# Patient Record
Sex: MALE | Race: WHITE | NOT HISPANIC OR LATINO | Employment: OTHER | URBAN - METROPOLITAN AREA
[De-identification: names, ages, dates, MRNs, and addresses within clinical notes are randomized per-mention and may not be internally consistent; named-entity substitution may affect disease eponyms.]

---

## 2020-02-18 ENCOUNTER — TRANSCRIBE ORDERS (OUTPATIENT)
Dept: ADMINISTRATIVE | Facility: HOSPITAL | Age: 65
End: 2020-02-18

## 2021-03-17 ENCOUNTER — TRANSCRIBE ORDERS (OUTPATIENT)
Dept: ADMINISTRATIVE | Facility: HOSPITAL | Age: 66
End: 2021-03-17

## 2021-03-17 DIAGNOSIS — R06.00 DYSPNEA, UNSPECIFIED: Primary | ICD-10-CM

## 2021-04-19 ENCOUNTER — HOSPITAL ENCOUNTER (OUTPATIENT)
Dept: NON INVASIVE DIAGNOSTICS | Facility: HOSPITAL | Age: 66
Discharge: HOME | End: 2021-04-19
Payer: MEDICARE

## 2021-04-19 DIAGNOSIS — R06.00 DYSPNEA, UNSPECIFIED: ICD-10-CM

## 2021-04-19 PROCEDURE — 93325 DOPPLER ECHO COLOR FLOW MAPG: CPT | Performed by: INTERNAL MEDICINE

## 2021-04-19 PROCEDURE — 93308 TTE F-UP OR LMTD: CPT | Performed by: INTERNAL MEDICINE

## 2021-04-19 PROCEDURE — 93306 TTE W/DOPPLER COMPLETE: CPT

## 2021-04-19 PROCEDURE — 93321 DOPPLER ECHO F-UP/LMTD STD: CPT | Performed by: INTERNAL MEDICINE

## 2021-06-09 DIAGNOSIS — D75.1 SECONDARY POLYCYTHEMIA: ICD-10-CM

## 2021-07-01 ENCOUNTER — HOSPITAL ENCOUNTER (OUTPATIENT)
Dept: ULTRASOUND IMAGING | Facility: HOSPITAL | Age: 66
Discharge: HOME | End: 2021-07-01
Payer: MEDICARE

## 2021-07-01 DIAGNOSIS — D75.1 SECONDARY POLYCYTHEMIA: ICD-10-CM

## 2021-07-01 PROCEDURE — 76700 US EXAM ABDOM COMPLETE: CPT

## 2021-09-30 ENCOUNTER — HOSPITAL ENCOUNTER (OUTPATIENT)
Dept: NON INVASIVE DIAGNOSTICS | Facility: HOSPITAL | Age: 66
Discharge: HOME | End: 2021-09-30

## 2021-09-30 DIAGNOSIS — B35.3 TINEA PEDIS OF BOTH FEET: ICD-10-CM

## 2022-05-30 ENCOUNTER — APPOINTMENT (EMERGENCY)
Dept: RADIOLOGY | Facility: HOSPITAL | Age: 67
DRG: 871 | End: 2022-05-30
Payer: MEDICARE

## 2022-05-30 ENCOUNTER — HOSPITAL ENCOUNTER (INPATIENT)
Facility: HOSPITAL | Age: 67
LOS: 3 days | Discharge: HOME | DRG: 871 | End: 2022-06-03
Attending: STUDENT IN AN ORGANIZED HEALTH CARE EDUCATION/TRAINING PROGRAM | Admitting: FAMILY MEDICINE
Payer: MEDICARE

## 2022-05-30 DIAGNOSIS — J96.22 ACUTE ON CHRONIC RESPIRATORY FAILURE WITH HYPOXIA AND HYPERCAPNIA (HCC): Primary | ICD-10-CM

## 2022-05-30 DIAGNOSIS — J44.1 COPD EXACERBATION (HCC): ICD-10-CM

## 2022-05-30 DIAGNOSIS — J90 BILATERAL PLEURAL EFFUSION: ICD-10-CM

## 2022-05-30 DIAGNOSIS — J18.9 PNEUMONIA: ICD-10-CM

## 2022-05-30 DIAGNOSIS — J96.21 ACUTE ON CHRONIC RESPIRATORY FAILURE WITH HYPOXIA AND HYPERCAPNIA (HCC): Primary | ICD-10-CM

## 2022-05-30 LAB
BASE EX.OXY STD BLDV CALC-SCNC: 77.5 % (ref 60–80)
BASE EXCESS BLDV CALC-SCNC: 9 MMOL/L
BASOPHILS # BLD AUTO: 0.08 THOUSANDS/ΜL (ref 0–0.1)
BASOPHILS NFR BLD AUTO: 0 % (ref 0–1)
EOSINOPHIL # BLD AUTO: 0.23 THOUSAND/ΜL (ref 0–0.61)
EOSINOPHIL NFR BLD AUTO: 1 % (ref 0–6)
ERYTHROCYTE [DISTWIDTH] IN BLOOD BY AUTOMATED COUNT: 13.3 % (ref 11.6–15.1)
HCO3 BLDV-SCNC: 36.4 MMOL/L (ref 24–30)
HCT VFR BLD AUTO: 47.8 % (ref 36.5–49.3)
HGB BLD-MCNC: 15.4 G/DL (ref 12–17)
IMM GRANULOCYTES # BLD AUTO: 0.08 THOUSAND/UL (ref 0–0.2)
IMM GRANULOCYTES NFR BLD AUTO: 0 % (ref 0–2)
INR PPP: 1.01 (ref 0.84–1.19)
LYMPHOCYTES # BLD AUTO: 0.9 THOUSANDS/ΜL (ref 0.6–4.47)
LYMPHOCYTES NFR BLD AUTO: 4 % (ref 14–44)
MCH RBC QN AUTO: 30.1 PG (ref 26.8–34.3)
MCHC RBC AUTO-ENTMCNC: 32.2 G/DL (ref 31.4–37.4)
MCV RBC AUTO: 94 FL (ref 82–98)
MONOCYTES # BLD AUTO: 1.4 THOUSAND/ΜL (ref 0.17–1.22)
MONOCYTES NFR BLD AUTO: 6 % (ref 4–12)
NEUTROPHILS # BLD AUTO: 20.15 THOUSANDS/ΜL (ref 1.85–7.62)
NEUTS SEG NFR BLD AUTO: 89 % (ref 43–75)
NRBC BLD AUTO-RTO: 0 /100 WBCS
O2 CT BLDV-SCNC: 17.8 ML/DL
PCO2 BLDV: 59.5 MM HG (ref 42–50)
PH BLDV: 7.4 [PH] (ref 7.3–7.4)
PLATELET # BLD AUTO: 281 THOUSANDS/UL (ref 149–390)
PMV BLD AUTO: 9.5 FL (ref 8.9–12.7)
PO2 BLDV: 40.6 MM HG (ref 35–45)
PROTHROMBIN TIME: 13.2 SECONDS (ref 11.6–14.5)
RBC # BLD AUTO: 5.11 MILLION/UL (ref 3.88–5.62)
WBC # BLD AUTO: 22.84 THOUSAND/UL (ref 4.31–10.16)

## 2022-05-30 PROCEDURE — 83880 ASSAY OF NATRIURETIC PEPTIDE: CPT | Performed by: STUDENT IN AN ORGANIZED HEALTH CARE EDUCATION/TRAINING PROGRAM

## 2022-05-30 PROCEDURE — 80053 COMPREHEN METABOLIC PANEL: CPT | Performed by: STUDENT IN AN ORGANIZED HEALTH CARE EDUCATION/TRAINING PROGRAM

## 2022-05-30 PROCEDURE — 87040 BLOOD CULTURE FOR BACTERIA: CPT | Performed by: STUDENT IN AN ORGANIZED HEALTH CARE EDUCATION/TRAINING PROGRAM

## 2022-05-30 PROCEDURE — 83605 ASSAY OF LACTIC ACID: CPT | Performed by: STUDENT IN AN ORGANIZED HEALTH CARE EDUCATION/TRAINING PROGRAM

## 2022-05-30 PROCEDURE — 84484 ASSAY OF TROPONIN QUANT: CPT | Performed by: STUDENT IN AN ORGANIZED HEALTH CARE EDUCATION/TRAINING PROGRAM

## 2022-05-30 PROCEDURE — 85025 COMPLETE CBC W/AUTO DIFF WBC: CPT | Performed by: STUDENT IN AN ORGANIZED HEALTH CARE EDUCATION/TRAINING PROGRAM

## 2022-05-30 PROCEDURE — 83735 ASSAY OF MAGNESIUM: CPT | Performed by: STUDENT IN AN ORGANIZED HEALTH CARE EDUCATION/TRAINING PROGRAM

## 2022-05-30 PROCEDURE — 82805 BLOOD GASES W/O2 SATURATION: CPT | Performed by: STUDENT IN AN ORGANIZED HEALTH CARE EDUCATION/TRAINING PROGRAM

## 2022-05-30 PROCEDURE — 84145 PROCALCITONIN (PCT): CPT | Performed by: STUDENT IN AN ORGANIZED HEALTH CARE EDUCATION/TRAINING PROGRAM

## 2022-05-30 PROCEDURE — 85379 FIBRIN DEGRADATION QUANT: CPT

## 2022-05-30 PROCEDURE — 99285 EMERGENCY DEPT VISIT HI MDM: CPT | Performed by: STUDENT IN AN ORGANIZED HEALTH CARE EDUCATION/TRAINING PROGRAM

## 2022-05-30 PROCEDURE — 85610 PROTHROMBIN TIME: CPT | Performed by: STUDENT IN AN ORGANIZED HEALTH CARE EDUCATION/TRAINING PROGRAM

## 2022-05-30 PROCEDURE — 93005 ELECTROCARDIOGRAM TRACING: CPT

## 2022-05-30 PROCEDURE — 0241U HB NFCT DS VIR RESP RNA 4 TRGT: CPT | Performed by: STUDENT IN AN ORGANIZED HEALTH CARE EDUCATION/TRAINING PROGRAM

## 2022-05-30 PROCEDURE — 99285 EMERGENCY DEPT VISIT HI MDM: CPT

## 2022-05-30 PROCEDURE — 1124F ACP DISCUSS-NO DSCNMKR DOCD: CPT | Performed by: STUDENT IN AN ORGANIZED HEALTH CARE EDUCATION/TRAINING PROGRAM

## 2022-05-30 PROCEDURE — 36415 COLL VENOUS BLD VENIPUNCTURE: CPT | Performed by: STUDENT IN AN ORGANIZED HEALTH CARE EDUCATION/TRAINING PROGRAM

## 2022-05-30 PROCEDURE — 71045 X-RAY EXAM CHEST 1 VIEW: CPT

## 2022-05-30 RX ORDER — ASPIRIN 81 MG/1
162 TABLET, CHEWABLE ORAL DAILY
COMMUNITY

## 2022-05-30 RX ORDER — PREDNISONE 20 MG/1
40 TABLET ORAL ONCE
Status: DISCONTINUED | OUTPATIENT
Start: 2022-05-30 | End: 2022-06-01

## 2022-05-30 RX ORDER — TRIAMTERENE AND HYDROCHLOROTHIAZIDE 37.5; 25 MG/1; MG/1
1 TABLET ORAL DAILY
COMMUNITY
End: 2022-06-03

## 2022-05-30 RX ORDER — AZITHROMYCIN 250 MG/1
250 TABLET, FILM COATED ORAL EVERY OTHER DAY
COMMUNITY
Start: 2022-03-29 | End: 2022-06-03

## 2022-05-30 RX ORDER — ALBUTEROL SULFATE 2.5 MG/3ML
2.5 SOLUTION RESPIRATORY (INHALATION)
COMMUNITY
Start: 2022-05-25 | End: 2022-06-03

## 2022-05-30 RX ORDER — OLMESARTAN MEDOXOMIL 5 MG/1
1 TABLET ORAL DAILY
COMMUNITY
Start: 2021-12-29

## 2022-05-30 RX ORDER — CHLORAL HYDRATE 500 MG
1000 CAPSULE ORAL
COMMUNITY

## 2022-05-30 RX ORDER — VITAMIN E 268 MG
400 CAPSULE ORAL
COMMUNITY

## 2022-05-30 RX ORDER — ALBUTEROL SULFATE 90 UG/1
AEROSOL, METERED RESPIRATORY (INHALATION)
Status: ON HOLD | COMMUNITY
Start: 2021-12-29 | End: 2022-06-03 | Stop reason: SDUPTHER

## 2022-05-30 RX ORDER — MULTIVITAMIN
1 TABLET ORAL DAILY
COMMUNITY

## 2022-05-31 ENCOUNTER — APPOINTMENT (EMERGENCY)
Dept: CT IMAGING | Facility: HOSPITAL | Age: 67
DRG: 871 | End: 2022-05-31
Payer: MEDICARE

## 2022-05-31 ENCOUNTER — APPOINTMENT (INPATIENT)
Dept: NON INVASIVE DIAGNOSTICS | Facility: HOSPITAL | Age: 67
DRG: 871 | End: 2022-05-31
Payer: MEDICARE

## 2022-05-31 ENCOUNTER — APPOINTMENT (INPATIENT)
Dept: CT IMAGING | Facility: HOSPITAL | Age: 67
DRG: 871 | End: 2022-05-31
Payer: MEDICARE

## 2022-05-31 PROBLEM — I50.31 ACUTE DIASTOLIC (CONGESTIVE) HEART FAILURE (HCC): Status: ACTIVE | Noted: 2022-05-31

## 2022-05-31 PROBLEM — J96.20 ACUTE ON CHRONIC RESPIRATORY FAILURE (HCC): Status: ACTIVE | Noted: 2022-05-31

## 2022-05-31 PROBLEM — E66.01 MORBID OBESITY WITH BMI OF 40.0-44.9, ADULT (HCC): Status: ACTIVE | Noted: 2022-05-31

## 2022-05-31 PROBLEM — I50.9 ACUTE CONGESTIVE HEART FAILURE (HCC): Status: ACTIVE | Noted: 2022-05-31

## 2022-05-31 LAB
2HR DELTA HS TROPONIN: 0 NG/L
4HR DELTA HS TROPONIN: 0 NG/L
ALBUMIN SERPL BCP-MCNC: 3.5 G/DL (ref 3.5–5)
ALP SERPL-CCNC: 79 U/L (ref 46–116)
ALT SERPL W P-5'-P-CCNC: 54 U/L (ref 12–78)
ANION GAP SERPL CALCULATED.3IONS-SCNC: 9 MMOL/L (ref 4–13)
AORTIC ROOT: 3.4 CM
AORTIC VALVE MEAN VELOCITY: 20.8 M/S
APICAL FOUR CHAMBER EJECTION FRACTION: 73 %
ASCENDING AORTA: 3.3 CM
AST SERPL W P-5'-P-CCNC: 42 U/L (ref 5–45)
ATRIAL RATE: 100 BPM
ATRIAL RATE: 123 BPM
AV AREA BY CONTINUOUS VTI: 1.1 CM2
AV AREA PEAK VELOCITY: 1.3 CM2
AV LVOT MEAN GRADIENT: 2 MMHG
AV LVOT PEAK GRADIENT: 4 MMHG
AV MEAN GRADIENT: 18 MMHG
AV PEAK GRADIENT: 27 MMHG
AV VALVE AREA: 1.08 CM2
AV VELOCITY RATIO: 0.36
BASOPHILS # BLD AUTO: 0.15 THOUSANDS/ΜL (ref 0–0.1)
BASOPHILS NFR BLD AUTO: 0 % (ref 0–1)
BILIRUB SERPL-MCNC: 0.69 MG/DL (ref 0.2–1)
BILIRUB UR QL STRIP: NEGATIVE
BUN SERPL-MCNC: 11 MG/DL (ref 5–25)
CALCIUM SERPL-MCNC: 9.3 MG/DL (ref 8.3–10.1)
CARDIAC TROPONIN I PNL SERPL HS: 11 NG/L
CHLORIDE SERPL-SCNC: 94 MMOL/L (ref 100–108)
CLARITY UR: CLEAR
CO2 SERPL-SCNC: 32 MMOL/L (ref 21–32)
COLOR UR: YELLOW
CREAT SERPL-MCNC: 0.63 MG/DL (ref 0.6–1.3)
D DIMER PPP FEU-MCNC: 0.58 UG/ML FEU
DOP CALC AO PEAK VEL: 2.61 M/S
DOP CALC AO VTI: 52.97 CM
DOP CALC LVOT AREA: 3.46 CM2
DOP CALC LVOT DIAMETER: 2.1 CM
DOP CALC LVOT PEAK VEL VTI: 16.59 CM
DOP CALC LVOT PEAK VEL: 0.95 M/S
DOP CALC LVOT STROKE INDEX: 22.8 ML/M2
DOP CALC LVOT STROKE VOLUME: 57.43 CM3
E WAVE DECELERATION TIME: 233 MS
EOSINOPHIL # BLD AUTO: 0.09 THOUSAND/ΜL (ref 0–0.61)
EOSINOPHIL NFR BLD AUTO: 0 % (ref 0–6)
ERYTHROCYTE [DISTWIDTH] IN BLOOD BY AUTOMATED COUNT: 13.5 % (ref 11.6–15.1)
FLUAV RNA RESP QL NAA+PROBE: NEGATIVE
FLUBV RNA RESP QL NAA+PROBE: NEGATIVE
FRACTIONAL SHORTENING: 55 % (ref 28–44)
GFR SERPL CREATININE-BSD FRML MDRD: 101 ML/MIN/1.73SQ M
GLUCOSE SERPL-MCNC: 108 MG/DL (ref 65–140)
GLUCOSE SERPL-MCNC: 122 MG/DL (ref 65–140)
GLUCOSE UR STRIP-MCNC: NEGATIVE MG/DL
HCT VFR BLD AUTO: 45.2 % (ref 36.5–49.3)
HGB BLD-MCNC: 14.6 G/DL (ref 12–17)
HGB UR QL STRIP.AUTO: NEGATIVE
IMM GRANULOCYTES # BLD AUTO: 0.39 THOUSAND/UL (ref 0–0.2)
IMM GRANULOCYTES NFR BLD AUTO: 1 % (ref 0–2)
INTERVENTRICULAR SEPTUM IN DIASTOLE (PARASTERNAL SHORT AXIS VIEW): 1.1 CM
INTERVENTRICULAR SEPTUM: 1.1 CM (ref 0.6–1.1)
KETONES UR STRIP-MCNC: NEGATIVE MG/DL
L PNEUMO1 AG UR QL IA.RAPID: NEGATIVE
LACTATE SERPL-SCNC: 2 MMOL/L (ref 0.5–2)
LEFT ATRIUM SIZE: 3.4 CM
LEFT INTERNAL DIMENSION IN SYSTOLE: 2.3 CM (ref 2.1–4)
LEFT VENTRICULAR INTERNAL DIMENSION IN DIASTOLE: 5.1 CM (ref 3.5–6)
LEFT VENTRICULAR POSTERIOR WALL IN END DIASTOLE: 1 CM
LEFT VENTRICULAR STROKE VOLUME: 106 ML
LEUKOCYTE ESTERASE UR QL STRIP: NEGATIVE
LVSV (TEICH): 106 ML
LYMPHOCYTES # BLD AUTO: 1.32 THOUSANDS/ΜL (ref 0.6–4.47)
LYMPHOCYTES NFR BLD AUTO: 4 % (ref 14–44)
MAGNESIUM SERPL-MCNC: 1.7 MG/DL (ref 1.6–2.6)
MCH RBC QN AUTO: 30.1 PG (ref 26.8–34.3)
MCHC RBC AUTO-ENTMCNC: 32.3 G/DL (ref 31.4–37.4)
MCV RBC AUTO: 93 FL (ref 82–98)
MONOCYTES # BLD AUTO: 2.2 THOUSAND/ΜL (ref 0.17–1.22)
MONOCYTES NFR BLD AUTO: 6 % (ref 4–12)
MV E'TISSUE VEL-LAT: 11 CM/S
MV E'TISSUE VEL-SEP: 12 CM/S
MV PEAK A VEL: 0.87 M/S
MV PEAK E VEL: 72 CM/S
MV STENOSIS PRESSURE HALF TIME: 68 MS
MV VALVE AREA P 1/2 METHOD: 3.24 CM2
NEUTROPHILS # BLD AUTO: 33.17 THOUSANDS/ΜL (ref 1.85–7.62)
NEUTS SEG NFR BLD AUTO: 89 % (ref 43–75)
NITRITE UR QL STRIP: NEGATIVE
NRBC BLD AUTO-RTO: 0 /100 WBCS
NT-PROBNP SERPL-MCNC: 30 PG/ML
P AXIS: 49 DEGREES
P AXIS: 50 DEGREES
PH UR STRIP.AUTO: 6.5 [PH]
PLATELET # BLD AUTO: 280 THOUSANDS/UL (ref 149–390)
PMV BLD AUTO: 9.4 FL (ref 8.9–12.7)
POTASSIUM SERPL-SCNC: 3.5 MMOL/L (ref 3.5–5.3)
PR INTERVAL: 142 MS
PR INTERVAL: 150 MS
PROCALCITONIN SERPL-MCNC: 0.67 NG/ML
PROCALCITONIN SERPL-MCNC: 2.37 NG/ML
PROT SERPL-MCNC: 7.4 G/DL (ref 6.4–8.2)
PROT UR STRIP-MCNC: NEGATIVE MG/DL
QRS AXIS: 12 DEGREES
QRS AXIS: 26 DEGREES
QRSD INTERVAL: 106 MS
QRSD INTERVAL: 128 MS
QT INTERVAL: 342 MS
QT INTERVAL: 368 MS
QTC INTERVAL: 474 MS
QTC INTERVAL: 489 MS
RBC # BLD AUTO: 4.85 MILLION/UL (ref 3.88–5.62)
RSV RNA RESP QL NAA+PROBE: NEGATIVE
S PNEUM AG UR QL: NEGATIVE
SARS-COV-2 RNA RESP QL NAA+PROBE: NEGATIVE
SL CV PED ECHO LEFT VENTRICLE DIASTOLIC VOLUME (MOD BIPLANE) 2D: 124 ML
SL CV PED ECHO LEFT VENTRICLE SYSTOLIC VOLUME (MOD BIPLANE) 2D: 18 ML
SODIUM SERPL-SCNC: 135 MMOL/L (ref 136–145)
SP GR UR STRIP.AUTO: 1.01 (ref 1–1.03)
T WAVE AXIS: 23 DEGREES
T WAVE AXIS: 30 DEGREES
UROBILINOGEN UR QL STRIP.AUTO: 0.2 E.U./DL
VENTRICULAR RATE: 100 BPM
VENTRICULAR RATE: 123 BPM
WBC # BLD AUTO: 37.32 THOUSAND/UL (ref 4.31–10.16)

## 2022-05-31 PROCEDURE — 82948 REAGENT STRIP/BLOOD GLUCOSE: CPT

## 2022-05-31 PROCEDURE — 84145 PROCALCITONIN (PCT): CPT

## 2022-05-31 PROCEDURE — 71275 CT ANGIOGRAPHY CHEST: CPT

## 2022-05-31 PROCEDURE — 87449 NOS EACH ORGANISM AG IA: CPT

## 2022-05-31 PROCEDURE — G1004 CDSM NDSC: HCPCS

## 2022-05-31 PROCEDURE — 99233 SBSQ HOSP IP/OBS HIGH 50: CPT | Performed by: FAMILY MEDICINE

## 2022-05-31 PROCEDURE — 93005 ELECTROCARDIOGRAM TRACING: CPT

## 2022-05-31 PROCEDURE — 94668 MNPJ CHEST WALL SBSQ: CPT

## 2022-05-31 PROCEDURE — 87070 CULTURE OTHR SPECIMN AEROBIC: CPT

## 2022-05-31 PROCEDURE — 94760 N-INVAS EAR/PLS OXIMETRY 1: CPT

## 2022-05-31 PROCEDURE — 93306 TTE W/DOPPLER COMPLETE: CPT

## 2022-05-31 PROCEDURE — 81003 URINALYSIS AUTO W/O SCOPE: CPT

## 2022-05-31 PROCEDURE — 94640 AIRWAY INHALATION TREATMENT: CPT

## 2022-05-31 PROCEDURE — 85025 COMPLETE CBC W/AUTO DIFF WBC: CPT

## 2022-05-31 PROCEDURE — 87205 SMEAR GRAM STAIN: CPT

## 2022-05-31 PROCEDURE — 87081 CULTURE SCREEN ONLY: CPT

## 2022-05-31 PROCEDURE — 84484 ASSAY OF TROPONIN QUANT: CPT | Performed by: STUDENT IN AN ORGANIZED HEALTH CARE EDUCATION/TRAINING PROGRAM

## 2022-05-31 RX ORDER — FUROSEMIDE 40 MG/1
40 TABLET ORAL 2 TIMES DAILY
Status: DISCONTINUED | OUTPATIENT
Start: 2022-05-31 | End: 2022-05-31

## 2022-05-31 RX ORDER — FLUTICASONE PROPIONATE 220 UG/1
2 AEROSOL, METERED RESPIRATORY (INHALATION) 2 TIMES DAILY
Status: DISCONTINUED | OUTPATIENT
Start: 2022-05-31 | End: 2022-05-31

## 2022-05-31 RX ORDER — FUROSEMIDE 10 MG/ML
40 INJECTION INTRAMUSCULAR; INTRAVENOUS
Status: DISCONTINUED | OUTPATIENT
Start: 2022-05-31 | End: 2022-06-01

## 2022-05-31 RX ORDER — ECHINACEA PURPUREA EXTRACT 125 MG
1 TABLET ORAL
Status: DISCONTINUED | OUTPATIENT
Start: 2022-05-31 | End: 2022-05-31

## 2022-05-31 RX ORDER — ALBUTEROL SULFATE 2.5 MG/3ML
2.5 SOLUTION RESPIRATORY (INHALATION) EVERY 4 HOURS PRN
Status: DISCONTINUED | OUTPATIENT
Start: 2022-05-31 | End: 2022-06-03 | Stop reason: HOSPADM

## 2022-05-31 RX ORDER — OXYMETAZOLINE HYDROCHLORIDE 0.05 G/100ML
2 SPRAY NASAL EVERY 12 HOURS PRN
Status: DISPENSED | OUTPATIENT
Start: 2022-05-31 | End: 2022-06-02

## 2022-05-31 RX ORDER — LIDOCAINE 50 MG/G
1 PATCH TOPICAL DAILY
Status: DISCONTINUED | OUTPATIENT
Start: 2022-05-31 | End: 2022-05-31

## 2022-05-31 RX ORDER — FLUTICASONE PROPIONATE 220 UG/1
2 AEROSOL, METERED RESPIRATORY (INHALATION) 2 TIMES DAILY
COMMUNITY
Start: 2022-03-15

## 2022-05-31 RX ORDER — FUROSEMIDE 10 MG/ML
40 INJECTION INTRAMUSCULAR; INTRAVENOUS ONCE
Status: COMPLETED | OUTPATIENT
Start: 2022-05-31 | End: 2022-05-31

## 2022-05-31 RX ORDER — ASPIRIN 81 MG/1
162 TABLET, CHEWABLE ORAL DAILY
Status: DISCONTINUED | OUTPATIENT
Start: 2022-05-31 | End: 2022-05-31

## 2022-05-31 RX ORDER — NICOTINE 21 MG/24HR
21 PATCH, TRANSDERMAL 24 HOURS TRANSDERMAL DAILY
Status: DISCONTINUED | OUTPATIENT
Start: 2022-05-31 | End: 2022-05-31

## 2022-05-31 RX ORDER — ASPIRIN 81 MG/1
81 TABLET, CHEWABLE ORAL DAILY
Status: DISCONTINUED | OUTPATIENT
Start: 2022-05-31 | End: 2022-06-03 | Stop reason: HOSPADM

## 2022-05-31 RX ORDER — NICOTINE 21 MG/24HR
21 PATCH, TRANSDERMAL 24 HOURS TRANSDERMAL DAILY
Status: DISCONTINUED | OUTPATIENT
Start: 2022-05-31 | End: 2022-06-03 | Stop reason: HOSPADM

## 2022-05-31 RX ORDER — AZITHROMYCIN 250 MG/1
250 TABLET, FILM COATED ORAL EVERY 24 HOURS
Status: COMPLETED | OUTPATIENT
Start: 2022-05-31 | End: 2022-06-03

## 2022-05-31 RX ORDER — CEFEPIME HYDROCHLORIDE 2 G/50ML
2000 INJECTION, SOLUTION INTRAVENOUS ONCE
Status: COMPLETED | OUTPATIENT
Start: 2022-05-31 | End: 2022-05-31

## 2022-05-31 RX ORDER — CEFTRIAXONE 1 G/50ML
1000 INJECTION, SOLUTION INTRAVENOUS EVERY 24 HOURS
Status: DISCONTINUED | OUTPATIENT
Start: 2022-05-31 | End: 2022-06-01

## 2022-05-31 RX ORDER — LIDOCAINE 50 MG/G
1 PATCH TOPICAL DAILY
Status: DISCONTINUED | OUTPATIENT
Start: 2022-05-31 | End: 2022-06-03 | Stop reason: HOSPADM

## 2022-05-31 RX ORDER — IPRATROPIUM BROMIDE AND ALBUTEROL SULFATE 2.5; .5 MG/3ML; MG/3ML
3 SOLUTION RESPIRATORY (INHALATION)
Status: DISCONTINUED | OUTPATIENT
Start: 2022-05-31 | End: 2022-06-03

## 2022-05-31 RX ORDER — GUAIFENESIN 600 MG
600 TABLET, EXTENDED RELEASE 12 HR ORAL 2 TIMES DAILY
Status: DISCONTINUED | OUTPATIENT
Start: 2022-05-31 | End: 2022-06-02

## 2022-05-31 RX ORDER — HEPARIN SODIUM 5000 [USP'U]/ML
5000 INJECTION, SOLUTION INTRAVENOUS; SUBCUTANEOUS EVERY 8 HOURS SCHEDULED
Status: DISCONTINUED | OUTPATIENT
Start: 2022-05-31 | End: 2022-06-03 | Stop reason: HOSPADM

## 2022-05-31 RX ORDER — LOSARTAN POTASSIUM 25 MG/1
12.5 TABLET ORAL DAILY
Status: DISCONTINUED | OUTPATIENT
Start: 2022-05-31 | End: 2022-06-03 | Stop reason: HOSPADM

## 2022-05-31 RX ORDER — FLUTICASONE PROPIONATE 220 UG/1
2 AEROSOL, METERED RESPIRATORY (INHALATION) 2 TIMES DAILY
Status: DISCONTINUED | OUTPATIENT
Start: 2022-05-31 | End: 2022-06-03 | Stop reason: HOSPADM

## 2022-05-31 RX ORDER — MAGNESIUM HYDROXIDE/ALUMINUM HYDROXICE/SIMETHICONE 120; 1200; 1200 MG/30ML; MG/30ML; MG/30ML
30 SUSPENSION ORAL EVERY 4 HOURS PRN
Status: DISCONTINUED | OUTPATIENT
Start: 2022-05-31 | End: 2022-06-03 | Stop reason: HOSPADM

## 2022-05-31 RX ORDER — ONDANSETRON 2 MG/ML
4 INJECTION INTRAMUSCULAR; INTRAVENOUS EVERY 6 HOURS PRN
Status: DISCONTINUED | OUTPATIENT
Start: 2022-05-31 | End: 2022-06-03 | Stop reason: HOSPADM

## 2022-05-31 RX ORDER — AZITHROMYCIN 250 MG/1
250 TABLET, FILM COATED ORAL EVERY OTHER DAY
Status: DISCONTINUED | OUTPATIENT
Start: 2022-06-01 | End: 2022-05-31

## 2022-05-31 RX ADMIN — FUROSEMIDE 40 MG: 10 INJECTION, SOLUTION INTRAMUSCULAR; INTRAVENOUS at 01:20

## 2022-05-31 RX ADMIN — HEPARIN SODIUM 5000 UNITS: 5000 INJECTION INTRAVENOUS; SUBCUTANEOUS at 21:56

## 2022-05-31 RX ADMIN — ASPIRIN 81 MG CHEWABLE TABLET 81 MG: 81 TABLET CHEWABLE at 08:44

## 2022-05-31 RX ADMIN — IPRATROPIUM BROMIDE AND ALBUTEROL SULFATE 3 ML: 2.5; .5 SOLUTION RESPIRATORY (INHALATION) at 19:52

## 2022-05-31 RX ADMIN — CYANOCOBALAMIN TAB 500 MCG 500 MCG: 500 TAB at 08:42

## 2022-05-31 RX ADMIN — FUROSEMIDE 40 MG: 10 INJECTION, SOLUTION INTRAMUSCULAR; INTRAVENOUS at 17:13

## 2022-05-31 RX ADMIN — Medication 1 TABLET: at 08:44

## 2022-05-31 RX ADMIN — ALBUTEROL SULFATE 2.5 MG: 2.5 SOLUTION RESPIRATORY (INHALATION) at 03:46

## 2022-05-31 RX ADMIN — NICOTINE 21 MG: 21 PATCH, EXTENDED RELEASE TRANSDERMAL at 04:09

## 2022-05-31 RX ADMIN — OXYMETAZOLINE HCL 2 SPRAY: 0.05 SPRAY NASAL at 08:41

## 2022-05-31 RX ADMIN — LIDOCAINE 5% 1 PATCH: 700 PATCH TOPICAL at 04:11

## 2022-05-31 RX ADMIN — SODIUM CHLORIDE 250 ML: 0.9 INJECTION, SOLUTION INTRAVENOUS at 02:11

## 2022-05-31 RX ADMIN — CEFTRIAXONE 1000 MG: 1 INJECTION, SOLUTION INTRAVENOUS at 12:50

## 2022-05-31 RX ADMIN — AZITHROMYCIN 250 MG: 250 TABLET, FILM COATED ORAL at 17:13

## 2022-05-31 RX ADMIN — FLUTICASONE PROPIONATE 2 PUFF: 220 AEROSOL, METERED RESPIRATORY (INHALATION) at 17:14

## 2022-05-31 RX ADMIN — VANCOMYCIN HYDROCHLORIDE 1500 MG: 1 INJECTION, POWDER, LYOPHILIZED, FOR SOLUTION INTRAVENOUS at 02:12

## 2022-05-31 RX ADMIN — HEPARIN SODIUM 5000 UNITS: 5000 INJECTION INTRAVENOUS; SUBCUTANEOUS at 05:24

## 2022-05-31 RX ADMIN — FUROSEMIDE 40 MG: 10 INJECTION, SOLUTION INTRAMUSCULAR; INTRAVENOUS at 08:46

## 2022-05-31 RX ADMIN — IOHEXOL 70 ML: 350 INJECTION, SOLUTION INTRAVENOUS at 16:36

## 2022-05-31 RX ADMIN — CEFEPIME HYDROCHLORIDE 2000 MG: 2 INJECTION, SOLUTION INTRAVENOUS at 01:22

## 2022-05-31 RX ADMIN — LOSARTAN POTASSIUM 12.5 MG: 25 TABLET, FILM COATED ORAL at 08:42

## 2022-05-31 RX ADMIN — FLUTICASONE PROPIONATE 2 PUFF: 220 AEROSOL, METERED RESPIRATORY (INHALATION) at 05:24

## 2022-05-31 RX ADMIN — MORPHINE SULFATE 2 MG: 2 INJECTION, SOLUTION INTRAMUSCULAR; INTRAVENOUS at 16:09

## 2022-05-31 RX ADMIN — ALUMINUM HYDROXIDE, MAGNESIUM HYDROXIDE, AND SIMETHICONE 30 ML: 200; 200; 20 SUSPENSION ORAL at 23:04

## 2022-05-31 RX ADMIN — HEPARIN SODIUM 5000 UNITS: 5000 INJECTION INTRAVENOUS; SUBCUTANEOUS at 12:49

## 2022-05-31 RX ADMIN — IPRATROPIUM BROMIDE AND ALBUTEROL SULFATE 3 ML: 2.5; .5 SOLUTION RESPIRATORY (INHALATION) at 14:35

## 2022-05-31 NOTE — ED NOTES
Wife updated via phone, will be waiting in her car until the covid test result        Tanna Wright RN  05/31/22 0000

## 2022-05-31 NOTE — ED NOTES
While Mehul Mancuso was at bedside performing admission evaluation when the pt became diaphoretic, felt weak and thought he was going to pass out  Repeat EKG performed, accucheck done without abnormalities  Symptoms resolved after a few minutes  Pt noted to have a decrease in BP  IVF bolus started  Pt repositioned in bed        Ruby Cifuentes RN  05/31/22 [id number]

## 2022-05-31 NOTE — PROGRESS NOTES
[address]  Progress Note Candance Ghee [place] 1955, 78 y o  male MRN: [id number]  Unit/Bed#: -01 Encounter: [id number]  Primary Care Provider: Rafa Goodwin DO   Date and time admitted to hospital: 5/30/2022 11:11 PM    * Acute on chronic respiratory failure ([place] [address] )  Assessment & Plan  · Presents with increased SOB and increased O2 requirements over the last several days  Wears 2-3L NC at baseline, was wearing up to 11L at home with O2 sats in the 70's at home per patient  Recently treated with Doxycycline and Zithromax for suspected URI as outpatient  · Chest X-ray shows pleural thickening  · In ED, patient unable to get CTA chest due to being unable to lay flat  Ideally would like to try to get CT chest if breathing status improves to rule out underlying pneumonia/acute process, PE     · Differentials include CAP vs fluid overload/acute CHF vs PE  Does not appear to be in COPD exacerbation  · Continue Rocephin and Azithromycin to cover for CAP given leukocytosis and elevated procalcitonin   · Check sputum culture  · Try to get CT PE study done today  · Wean O2 to keep SpO2 > 90%       Acute diastolic (congestive) heart failure [facility])  Assessment & Plan  Wt Readings from Last 3 Encounters:   05/31/22 135 kg (297 lb)     · Last echo April 2021 shows EF 60% with G1DD  2d echo this admission shows normal EF and mild aortic stenosis  · Bilateral lower extremity edema present   · Home regimen is Maxzide, start Lasix 40mg iv  BID  · Per chart review, patient has refused Lasix as outpatient in the past    · Daily weights, I&O's        Morbid obesity with BMI of 40 0-44 9, adult ([place] [address] )  Assessment & Plan  BMI is 41 42    Need counseling on diet exercise and lifestyle modification    Sepsis ([place] [address] )  Assessment & Plan  · Present on admission as evidenced by tachycardia, tachypnea, leukocytosis WBC 22  · Afebrile, lactate normal   · COVID/flu/RSV negative  · Procalcitonin 0 67, 2 37  · In ED, received Iv cepfepime and vanco x 1  · In ed received 250cc bolus--patient with evidence of CHF and BMI 42   · F/U blood cultures, continue Rocephin and Zithromax   · Check UA    · Suspected source possible underlying pneumonia  Consult placed to pulmonology       Tobacco abuse  Assessment & Plan  · Reports he quit smoking a couple of days prior to admission, previously 2 ppd   · Nicotine patch ordered, patient request    Essential hypertension  Assessment & Plan  · Blood pressure controlled  Continue Losartan   · Hold Maxzide, continue Lasix     COPD, severe (HCC)  Assessment & Plan    · No wheezing present on exam, does not appear to be in acute exacerbation  · Of note, patient refusing all steroids due to negative reaction (agitation) in the past   · Continue Flovent and PRN albuterol nebs   · Continue Zithromax MWF as maintenance therapy   · Wean to baseline 2-3L NC as tolerated  · Pulmonary toilet, flutter valve TID   · Tapering course of prednisone    VTE Pharmacologic Prophylaxis:   Pharmacologic: Heparin  Mechanical VTE Prophylaxis in Place: Yes    Patient Centered Rounds: I have performed bedside rounds with nursing staff today  Discussions with Specialists or Other Care Team Provider:  None    Education and Discussions with Family / Patient:  Discussed with patient at bedside will update family    Time Spent for Care: 45 minutes  More than 50% of total time spent on counseling and coordination of care as described above  Current Length of Stay: 0 day(s)    Current Patient Status: Inpatient   Certification Statement: The patient will continue to require additional inpatient hospital stay due to Acute on chronic respiratory failure with hypoxia    Discharge Plan:  Pending progress    Code Status: Level 1 - Full Code      Subjective:   Patient complaining of shortness of breath unable to lay flat  Denies any chest pain    Complains orthopnea and some anxiety at this time secondary to orthopnea    Objective:     Vitals:   Temp (24hrs), Av 5 °F (36 9 °C), Min:97 8 °F (36 6 °C), Max:99 2 °F (37 3 °C)    Temp:  [97 8 °F (36 6 °C)-99 2 °F (37 3 °C)] 97 8 °F (36 6 °C)  HR:  [] 68  Resp:  [20-25] 23  BP: ()/(52-69) 119/69  SpO2:  [90 %-95 %] 90 %  Body mass index is 41 42 kg/m²  Input and Output Summary (last 24 hours): Intake/Output Summary (Last 24 hours) at 2022 1326  Last data filed at 2022 1248  Gross per 24 hour   Intake 1380 ml   Output 2650 ml   Net -1270 ml       Physical Exam:     Physical Exam  Vitals and nursing note reviewed  Constitutional:       Appearance: He is obese  He is ill-appearing  HENT:      Head: Normocephalic and atraumatic  Right Ear: External ear normal       Left Ear: External ear normal       Nose: Nose normal       Mouth/Throat:      Pharynx: Oropharynx is clear  Eyes:      Pupils: Pupils are equal, round, and reactive to light  Cardiovascular:      Rate and Rhythm: Regular rhythm  Tachycardia present  Heart sounds: Normal heart sounds  Pulmonary:      Effort: Pulmonary effort is normal       Comments: Increased work of breathing noted  Mild decreased breath sounds bilateral bases  No wheezing or rhonchi noted  Diminished decreased breath sounds noted probably also due to body habitus  Abdominal:      General: Bowel sounds are normal       Palpations: Abdomen is soft  Tenderness: There is no abdominal tenderness  Musculoskeletal:         General: Normal range of motion  Cervical back: Normal range of motion and neck supple  Right lower leg: Edema present  Left lower leg: Edema present  Skin:     General: Skin is warm and dry  Capillary Refill: Capillary refill takes less than 2 seconds  Neurological:      General: No focal deficit present  Mental Status: He is alert and oriented to person, place, and time     Psychiatric:      Comments: Anxious           Additional Data: Labs:    Results from last 7 days   Lab Units 05/31/22  0518   WBC Thousand/uL 37 32*   HEMOGLOBIN g/dL 14 6   HEMATOCRIT % 45 2   PLATELETS Thousands/uL 280   NEUTROS PCT % 89*   LYMPHS PCT % 4*   MONOS PCT % 6   EOS PCT % 0     Results from last 7 days   Lab Units 05/30/22  2329   SODIUM mmol/L 135*   POTASSIUM mmol/L 3 5   CHLORIDE mmol/L 94*   CO2 mmol/L 32   BUN mg/dL 11   CREATININE mg/dL 0 63   ANION GAP mmol/L 9   CALCIUM mg/dL 9 3   ALBUMIN g/dL 3 5   TOTAL BILIRUBIN mg/dL 0 69   ALK PHOS U/L 79   ALT U/L 54   AST U/L 42   GLUCOSE RANDOM mg/dL 108     Results from last 7 days   Lab Units 05/30/22  2329   INR  1 01     Results from last 7 days   Lab Units 05/31/22  0150   POC GLUCOSE mg/dl 122         Results from last 7 days   Lab Units 05/31/22  0518 05/30/22  2329   LACTIC ACID mmol/L  --  2 0   PROCALCITONIN ng/ml 2 37* 0 67*           * I Have Reviewed All Lab Data Listed Above  * Additional Pertinent Lab Tests Reviewed: Twila 65 Admission Reviewed    Imaging:    Imaging Reports Reviewed Today Include:  Chest x-ray, 2D echo  Imaging Personally Reviewed by Myself Includes:  Chest x-ray    Recent Cultures (last 7 days):     Results from last 7 days   Lab Units 05/31/22  0312 05/30/22  2339 05/30/22  2329   BLOOD CULTURE   --  Received in Microbiology Lab  Culture in Progress  Received in Microbiology Lab  Culture in Progress     LEGIONELLA URINARY ANTIGEN  Negative  --   --        Last 24 Hours Medication List:   Current Facility-Administered Medications   Medication Dose Route Frequency Provider Last Rate    albuterol  2 5 mg Nebulization Q4H PRN Zollie April, CRNP      aspirin  81 mg Oral Daily Zollie April, [address]      [START ON 6/1/2022] azithromycin  250 mg Oral Every Other Day Zollie April, CRNP      cefTRIAXone  1,000 mg Intravenous Q24H Zollie April, CRNP 1,000 mg (05/31/22 1250)    cyanocobalamin  500 mcg Oral Daily Zollie April, CRNP      fluticasone  2 puff Inhalation BID Alverta Maryland, CRNP      furosemide  40 mg Intravenous BID (diuretic) Alverta Maryland, CRNP      guaiFENesin  600 mg Oral BID Alverta Maryland, CRNP      heparin (porcine)  5,000 Units Subcutaneous [facility] Alverta Maryland, CRNP      lidocaine  1 patch Topical Daily Alverta Maryland, [address]      losartan  12 5 mg Oral Daily Alverta Maryland, [address]      morphine injection  2 mg Intravenous Q4H PRN Michelle Siddiqi MD      multivitamin-minerals  1 tablet Oral Daily Alverta Maryland, [address]      nicotine  21 mg Transdermal Daily Alverta Maryland, CRNP      ondansetron  4 mg Intravenous Q6H PRN Alverta Maryland, CRNP      oxymetazoline  2 spray Each Nare Q12H PRN Alverta Maryland, CRNP      predniSONE  40 mg Oral Once Jesenia Bradley DO          Today, Patient Was Seen By: Michelle Siddiqi MD    ** Please Note: Dictation voice to text software may have been used in the creation of this document   **

## 2022-05-31 NOTE — ASSESSMENT & PLAN NOTE
Wt Readings from Last 3 Encounters:   05/31/22 135 kg (298 lb 11 6 oz)     · Last echo April 2021 shows EF 60% with G1DD  · Bilateral lower extremity edema present   · XR with possible bilateral pleural effusions, vascular congestion  Awaiting final read  · Home regimen is Maxzide, start Lasix 40mg BID     · Per chart review, patient has refused Lasix as outpatient in the past    · Update echo  · Daily weights, I&O's

## 2022-05-31 NOTE — ASSESSMENT & PLAN NOTE
· Present on admission as evidenced by tachycardia, tachypnea, leukocytosis WBC 22  · Afebrile, lactate normal   · COVID/flu/RSV negative  · Procalcitonin 0 67, 2 37  · In ED, received Iv cepfepime and vanco x 1  · In ed received 250cc bolus--patient with evidence of CHF and BMI 42   · F/U blood cultures, continue Rocephin and Zithromax   · Check UA    · Suspected source possible underlying pneumonia   Consult placed to pulmonology

## 2022-05-31 NOTE — ASSESSMENT & PLAN NOTE
· Reports he quit smoking a couple of days prior to admission, previously 2 ppd   · Nicotine patch ordered, patient request

## 2022-05-31 NOTE — ASSESSMENT & PLAN NOTE
· Presents with increased SOB and increased O2 requirements over the last several days  Wears 2-3L NC at baseline, was wearing up to 11L at home with O2 sats in the 70's at home per patient  Recently treated with Doxycycline and Zithromax for suspected URI as outpatient  · XR showing possible bilateral pleural effusions, awaiting final read  · In ED, patient unable to get CTA chest due to being unable to lay flat  Ideally would like to try to get CT chest if breathing status improves to rule out underlying pneumonia/acute process, PE     · Differentials include CAP vs fluid overload/acute CHF vs PE  Does not appear to be in COPD exacerbation    · Continue Rocephin and Azithromycin to cover for CAP given leukocytosis and elevated procalcitonin   · Check sputum culture  · Consider critical care consult pending Xray read   · Wean O2 to keep SpO2 > 90%

## 2022-05-31 NOTE — PLAN OF CARE
Problem: Potential for Falls  Goal: Patient will remain free of falls  Description: INTERVENTIONS:  - Educate patient/family on patient safety including physical limitations  - Instruct patient to call for assistance with activity   - Consult OT/PT to assist with strengthening/mobility   - Keep Call bell within reach  - Keep bed low and locked with side rails adjusted as appropriate  - Keep care items and personal belongings within reach  - Initiate and maintain comfort rounds  - Make Fall Risk Sign visible to staff  - Offer Toileting every 2 Hours, in advance of need  - Apply yellow socks and bracelet for high fall risk patients  - Consider moving patient to room near nurses station  Outcome: Progressing     Problem: MOBILITY - ADULT  Goal: Maintain or return to baseline ADL function  Description: INTERVENTIONS:  -  Assess patient's ability to carry out ADLs; assess patient's baseline for ADL function and identify physical deficits which impact ability to perform ADLs (bathing, care of mouth/teeth, toileting, grooming, dressing, etc )  - Assess/evaluate cause of self-care deficits   - Assess range of motion  - Assess patient's mobility; develop plan if impaired  - Assess patient's need for assistive devices and provide as appropriate  - Encourage maximum independence but intervene and supervise when necessary  - Involve family in performance of ADLs  - Assess for home care needs following discharge   - Consider OT consult to assist with ADL evaluation and planning for discharge  - Provide patient education as appropriate  Outcome: Progressing  Goal: Maintains/Returns to pre admission functional level  Description: INTERVENTIONS:  - Perform BMAT or MOVE assessment daily    - Set and communicate daily mobility goal to care team and patient/family/caregiver     - Collaborate with rehabilitation services on mobility goals if consulted  - Out of bed for toileting  - Record patient progress and toleration of activity level   Outcome: Progressing     Problem: Nutrition/Hydration-ADULT  Goal: Nutrient/Hydration intake appropriate for improving, restoring or maintaining nutritional needs  Description: Monitor and assess patient's nutrition/hydration status for malnutrition  Collaborate with interdisciplinary team and initiate plan and interventions as ordered  Monitor patient's weight and dietary intake as ordered or per policy  Utilize nutrition screening tool and intervene as necessary  Determine patient's food preferences and provide high-protein, high-caloric foods as appropriate       INTERVENTIONS:  - Monitor oral intake, urinary output, labs, and treatment plans  - Assess nutrition and hydration status and recommend course of action  - Evaluate amount of meals eaten  - Assist patient with eating if necessary   - Allow adequate time for meals  - Recommend/ encourage appropriate diets, oral nutritional supplements, and vitamin/mineral supplements  - Order, calculate, and assess calorie counts as needed  - Recommend, monitor, and adjust tube feedings and TPN/PPN based on assessed needs  - Assess need for intravenous fluids  - Provide specific nutrition/hydration education as appropriate  - Include patient/family/caregiver in decisions related to nutrition  Outcome: Progressing     Problem: CARDIOVASCULAR - ADULT  Goal: Maintains optimal cardiac output and hemodynamic stability  Description: INTERVENTIONS:  - Monitor I/O, vital signs and rhythm  - Monitor for S/S and trends of decreased cardiac output  - Administer and titrate ordered vasoactive medications to optimize hemodynamic stability  - Assess quality of pulses, skin color and temperature  - Assess for signs of decreased coronary artery perfusion  - Instruct patient to report change in severity of symptoms  Outcome: Progressing  Goal: Absence of cardiac dysrhythmias or at baseline rhythm  Description: INTERVENTIONS:  - Continuous cardiac monitoring, vital signs, obtain 12 lead EKG if ordered  - Administer antiarrhythmic and heart rate control medications as ordered  - Monitor electrolytes and administer replacement therapy as ordered  Outcome: Progressing     Problem: RESPIRATORY - ADULT  Goal: Achieves optimal ventilation and oxygenation  Description: INTERVENTIONS:  - Assess for changes in respiratory status  - Assess for changes in mentation and behavior  - Position to facilitate oxygenation and minimize respiratory effort  - Oxygen administered by appropriate delivery if ordered  - Initiate smoking cessation education as indicated  - Encourage broncho-pulmonary hygiene including cough, deep breathe, Incentive Spirometry  - Assess the need for suctioning and aspirate as needed  - Assess and instruct to report SOB or any respiratory difficulty  - Respiratory Therapy support as indicated  Outcome: Progressing

## 2022-05-31 NOTE — ED PROVIDER NOTES
History  Chief Complaint   Patient presents with    Shortness of Breath     Today was increasingly SOB, wears 4L NC O2 chronically but today had to have 11L and O2 sat showing 70's%  History provided by:  EMS personnel and patient  Shortness of Breath  Severity:  Severe  Onset quality:  Gradual  Timing:  Constant  Progression:  Worsening  Chronicity:  Recurrent  Context: activity and URI    Relieved by:  Nothing  Worsened by: Activity  Ineffective treatments:  Inhaler, position changes, sitting up, oxygen and rest  Associated symptoms: cough, fever and sputum production    Associated symptoms: no abdominal pain, no chest pain, no headaches, no hemoptysis, no neck pain, no rash, no sore throat, no vomiting and no wheezing    Risk factors: tobacco use       25-year-old male  History of COPD chronic supplemental oxygen (3-4 L at baseline), tobacco abuse  Presents to the ED with worsening shortness of breath  Having worsening dyspnea for the past few days  Per the patient, he is prescribed azithromycin MWF but was recently prescribed a daily course after a course of doxycycline for presumed URI/bronchitis  Over the past few days, the patient has been requiring upwards of 11 L O2 via nasal cannula  Continues to have productive cough and has been using his nebulizers more frequently  EMS found the patient wearing three nasal cannulas with each of them on 4 L O2  He was administered an additional Duoneb treatment  The patient expresses fevers throughout the day  Past Medical History:   Diagnosis Date    COPD (chronic obstructive pulmonary disease) ([facility] [address] )        History reviewed  No pertinent surgical history  History reviewed  No pertinent family history  I have reviewed and agree with the history as documented      E-Cigarette/Vaping     E-Cigarette/Vaping Substances     Social History     Tobacco Use    Smoking status: Former Smoker     Packs/day: 1 00     Quit date: 5/28/2022    Smokeless tobacco: Never Used   Substance Use Topics    Alcohol use: Not Currently    Drug use: Not Currently     Review of Systems   Constitutional: Positive for chills and fever  Negative for activity change and appetite change  HENT: Positive for congestion, rhinorrhea, sinus pressure and sinus pain  Negative for sore throat  Respiratory: Positive for cough, sputum production, chest tightness and shortness of breath  Negative for hemoptysis and wheezing  Cardiovascular: Positive for leg swelling  Negative for chest pain  Gastrointestinal: Negative for abdominal pain, diarrhea, nausea and vomiting  Genitourinary: Negative for decreased urine volume, difficulty urinating, flank pain, frequency and urgency  Musculoskeletal: Negative for back pain, myalgias, neck pain and neck stiffness  Skin: Negative for color change, pallor, rash and wound  Neurological: Negative for dizziness, syncope, light-headedness and headaches  Hematological: Does not bruise/bleed easily  Psychiatric/Behavioral: Negative for confusion  All other systems reviewed and are negative  Physical Exam  Physical Exam  Vitals and nursing note reviewed  Constitutional:       General: He is not in acute distress  Appearance: He is ill-appearing  He is not toxic-appearing  Interventions: He is not intubated  Comments: Elevated BMI   HENT:      Head: Normocephalic and atraumatic  Mouth/Throat:      Mouth: Mucous membranes are moist       Pharynx: Oropharynx is clear  Eyes:      Extraocular Movements: Extraocular movements intact  Pupils: Pupils are equal, round, and reactive to light  Cardiovascular:      Rate and Rhythm: Regular rhythm  Tachycardia present  Pulses: Normal pulses  Heart sounds: No murmur heard  Pulmonary:      Effort: Tachypnea, accessory muscle usage and respiratory distress present  He is not intubated  Breath sounds: No stridor   Examination of the right-middle field reveals decreased breath sounds  Examination of the left-middle field reveals decreased breath sounds  Examination of the right-lower field reveals decreased breath sounds  Examination of the left-lower field reveals decreased breath sounds  Decreased breath sounds present  No wheezing, rhonchi or rales  Chest:      Chest wall: No tenderness  Abdominal:      General: Bowel sounds are normal       Palpations: Abdomen is soft  Tenderness: There is no abdominal tenderness  There is no guarding or rebound  Musculoskeletal:      Right lower leg: No tenderness  Edema present  Left lower leg: No tenderness  Edema present  Skin:     General: Skin is warm and dry  Capillary Refill: Capillary refill takes less than 2 seconds  Coloration: Skin is not cyanotic or pale  Findings: No ecchymosis, erythema or rash  Nails: There is no clubbing  Neurological:      General: No focal deficit present  Mental Status: He is alert and oriented to person, place, and time  Cranial Nerves: No cranial nerve deficit  Motor: No weakness  Psychiatric:         Mood and Affect: Mood is anxious  Behavior: Behavior normal  Behavior is not agitated         Vital Signs  ED Triage Vitals [05/30/22 2317]   Temperature Pulse Respirations Blood Pressure SpO2   99 2 °F (37 3 °C) (!) 126 (!) 24 142/60 90 %      Temp Source Heart Rate Source Patient Position - Orthostatic VS BP Location FiO2 (%)   Temporal Monitor Sitting Right arm --      Pain Score       --         Vitals:    05/30/22 2317   BP: 142/60   Pulse: (!) 126   Patient Position - Orthostatic VS: Sitting     ED Medications  Medications   furosemide (LASIX) injection 40 mg (has no administration in time range)   cefepime (MAXIPIME) IVPB (premix in dextrose) 2,000 mg 50 mL (0 mg Intravenous Stopped 5/31/22 0152)   furosemide (LASIX) injection 40 mg (40 mg Intravenous Given 5/31/22 0120)   sodium chloride 0 9 % bolus 250 mL (0 mL Intravenous Stopped 5/31/22 0258)     Diagnostic Studies  Results Reviewed     Procedure Component Value Units Date/Time    HS Troponin I 4hr [[id number]] Collected: 05/31/22 0313    Lab Status: In process Specimen: Blood from Arm, Left Updated: 05/31/22 0321    MRSA culture [[id number]] Collected: 05/31/22 0308    Lab Status: In process Specimen: Nares from Nose Updated: 05/31/22 0321    Fingerstick Glucose (POCT) [[id number]]  (Normal) Collected: 05/31/22 0150    Lab Status: Final result Updated: 05/31/22 0150     POC Glucose 122 mg/dl     HS Troponin I 2hr [[id number]] Collected: 05/31/22 0120    Lab Status: In process Specimen: Blood from Line, Venous Updated: 05/31/22 0131    COVID/FLU/RSV [[id number]]  (Normal) Collected: 05/30/22 2334    Lab Status: Final result Specimen: Nares from Nose Updated: 05/31/22 0031     SARS-CoV-2 Negative     INFLUENZA A PCR Negative     INFLUENZA B PCR Negative     RSV PCR Negative    Narrative:      FOR PEDIATRIC PATIENTS - copy/paste COVID Guidelines URL to browser: [email or web address]  [email or web address]    SARS-CoV-2 assay is a Nucleic Acid Amplification assay intended for the  qualitative detection of nucleic acid from SARS-CoV-2 in nasopharyngeal  swabs  Results are for the presumptive identification of SARS-CoV-2 RNA  Positive results are indicative of infection with SARS-CoV-2, the virus  causing COVID-19, but do not rule out bacterial infection or co-infection  with other viruses  Laboratories within the [place] and its  territories are required to report all positive results to the appropriate  public health authorities  Negative results do not preclude SARS-CoV-2  infection and should not be used as the sole basis for treatment or other  patient management decisions  Negative results must be combined with  clinical observations, patient history, and epidemiological information  This test has not been FDA cleared or approved      This test has been authorized by FDA under an Emergency Use Authorization  (EUA)  This test is only authorized for the duration of time the  declaration that circumstances exist justifying the authorization of the  emergency use of an in vitro diagnostic tests for detection of SARS-CoV-2  virus and/or diagnosis of COVID-19 infection under section 564(b)(1) of  the Act, 21 U  S C  594XBY-3(I)(5), unless the authorization is terminated  or revoked sooner  The test has been validated but independent review by FDA  and CLIA is pending  Test performed using [employer] Gene[employer]pert: This RT-PCR assay targets N2,  a region unique to SARS-CoV-2  A conserved region in the E-gene was chosen  for pan-Sarbecovirus detection which includes SARS-CoV-2  Procalcitonin [[id number]]  (Abnormal) Collected: 05/30/22 2329    Lab Status: Final result Specimen: Blood from Arm, Right Updated: 05/31/22 0030     Procalcitonin 0 67 ng/ml     HS Troponin 0hr (reflex protocol) [[id number]]  (Normal) Collected: 05/30/22 2329    Lab Status: Final result Specimen: Blood from Arm, Right Updated: 05/31/22 0009     hs TnI 0hr 11 ng/L     Magnesium [[id number]]  (Normal) Collected: 05/30/22 2329    Lab Status: Final result Specimen: Blood from Arm, Right Updated: 05/31/22 0007     Magnesium 1 7 mg/dL     NT-BNP PRO [[id number]]  (Normal) Collected: 05/30/22 2329    Lab Status: Final result Specimen: Blood from Arm, Right Updated: 05/31/22 0007     NT-proBNP 30 pg/mL     Lactic acid, plasma [[id number]]  (Normal) Collected: 05/30/22 2329    Lab Status: Final result Specimen: Blood from Arm, Right Updated: 05/31/22 0006     LACTIC ACID 2 0 mmol/L     Narrative:      Result may be elevated if tourniquet was used during collection      Comprehensive metabolic panel [[id number]]  (Abnormal) Collected: 05/30/22 2329    Lab Status: Final result Specimen: Blood from Arm, Right Updated: 05/31/22 0003     Sodium 135 mmol/L      Potassium 3 5 mmol/L      Chloride 94 mmol/L      CO2 32 mmol/L      ANION GAP 9 mmol/L      BUN 11 mg/dL      Creatinine 0 63 mg/dL      Glucose 108 mg/dL      Calcium 9 3 mg/dL      AST 42 U/L      ALT 54 U/L      Alkaline Phosphatase 79 U/L      Total Protein 7 4 g/dL      Albumin 3 5 g/dL      Total Bilirubin 0 69 mg/dL      eGFR 101 ml/min/1 73sq m     Narrative:      [place] guidelines for Chronic Kidney Disease (CKD):     Stage 1 with normal or high GFR (GFR > 90 mL/min/1 73 square meters)    Stage 2 Mild CKD (GFR = 60-89 mL/min/1 73 square meters)    Stage 3A Moderate CKD (GFR = 45-59 mL/min/1 73 square meters)    Stage 3B Moderate CKD (GFR = 30-44 mL/min/1 73 square meters)    Stage 4 Severe CKD (GFR = 15-29 mL/min/1 73 square meters)    Stage 5 End Stage CKD (GFR <15 mL/min/1 73 square meters)  Note: GFR calculation is accurate only with a steady state creatinine    Protime-INR [[id number]]  (Normal) Collected: 05/30/22 2329    Lab Status: Final result Specimen: Blood from Arm, Right Updated: 05/30/22 2357     Protime 13 2 seconds      INR 1 01    Blood culture #1 [[id number]] Collected: 05/30/22 2339    Lab Status:  In process Specimen: Blood from Arm, Left Updated: 05/30/22 2351    Blood gas, venous [[id number]]  (Abnormal) Collected: 05/30/22 2329    Lab Status: Final result Specimen: Blood from Arm, Right Updated: 05/30/22 2345     pH, González 7 404     pCO2, González 59 5 mm Hg      pO2, González 40 6 mm Hg      HCO3, González 36 4 mmol/L      Base Excess, González 9 0 mmol/L      O2 Content, González 17 8 ml/dL      O2 HGB, VENOUS 77 5 %     CBC and differential [[id number]]  (Abnormal) Collected: 05/30/22 2329    Lab Status: Final result Specimen: Blood from Arm, Right Updated: 05/30/22 2344     WBC 22 84 Thousand/uL      RBC 5 11 Million/uL      Hemoglobin 15 4 g/dL      Hematocrit 47 8 %      MCV 94 fL      MCH 30 1 pg      MCHC 32 2 g/dL      RDW 13 3 %      MPV 9 5 fL      Platelets 421 Thousands/uL      nRBC 0 /100 WBCs      Neutrophils Relative 89 %      Immat GRANS % 0 %      Lymphocytes Relative 4 %      Monocytes Relative 6 %      Eosinophils Relative 1 %      Basophils Relative 0 %      Neutrophils Absolute 20 15 Thousands/µL      Immature Grans Absolute 0 08 Thousand/uL      Lymphocytes Absolute 0 90 Thousands/µL      Monocytes Absolute 1 40 Thousand/µL      Eosinophils Absolute 0 23 Thousand/µL      Basophils Absolute 0 08 Thousands/µL     Blood culture #2 [[id number]] Collected: 05/30/22 2329    Lab Status: In process Specimen: Blood from Arm, Right Updated: 05/30/22 7451             XR chest 1 view portable   ED Interpretation by Warden Pa DO (05/31 0013)   Bilateral pleural effusions             Procedures  ECG 12 Lead Documentation Only    Date/Time: 5/30/2022 11:31 PM  Performed by: Warden Pa DO  Authorized by: Warden Pa DO     Indications / Diagnosis:  Shortness of breath  ECG reviewed by me, the ED Provider: yes    Patient location:  ED  Previous ECG:     Previous ECG:  Compared to current    Similarity:  No change  Interpretation:     Interpretation: abnormal    Rate:     ECG rate:  123    ECG rate assessment: tachycardic    Rhythm:     Rhythm: sinus tachycardia    Ectopy:     Ectopy: none    QRS:     QRS axis:  Normal    QRS intervals:  Normal  Conduction:     Conduction: normal    ST segments:     ST segments:  Normal  T waves:     T waves: normal        ED Course  ED Course as of 05/31/22 0200   Tue May 31, 2022   0054 Patient refusing Prednisone and CT imaging (unable to lie flat)     MDM     78year old M  Hx of COPD, tobacco abuse  On supplemental oxygen and chronic abx  Presents to the ED with worsening shortness of breath  Required upwards of 11-12 L O2 via nasal cannula throughout the day  He states that he has been having URI symptoms and fever  W/u sig for leukocytosis, elevated procalcitonin  Elevated pCO2 with likely chronically elevated bicarbonate  CXR +bilateral pleural effusion   The patient examined fluid overloaded, so possible mixed COPD and CHF exacerbations  Prednisone ordered but the patient refused  CT of the chest attempted but unable to obtained due to the patient unable to lie flat  IV Vancomycin/Cefepime, Lasix administered  Possible parapneumonic effusion  The patient was admitted to the [place] service  Disposition  Final diagnoses:   Acute on chronic respiratory failure with hypoxia and hypercapnia (HCC)   COPD exacerbation (HCC)   Bilateral pleural effusion     Time reflects when diagnosis was documented in both MDM as applicable and the Disposition within this note     Time User Action Codes Description Comment    5/31/2022  1:17 AM Darcus Blade Add [J96 21,  J96 22] Acute on chronic respiratory failure with hypoxia and hypercapnia ([facility] [address] )     5/31/2022  1:17 AM Darcus Blade Add [J44 1] COPD exacerbation ([facility] [address] )     5/31/2022  1:17 AM Darcus Blade Add [J90] Chronic bilateral pleural effusions       ED Disposition     ED Disposition   Admit    Condition   Stable    Date/Time   Tue May 31, 2022  1:18 AM    Comment   Case was discussed with Jerel Robbins and the patient's admission status was agreed to be Admission Status: inpatient status to the service of Dr Armando Babinski             Follow-up Information    None         Patient's Medications    No medications on file       No discharge procedures on file      PDMP Review     None          ED Provider  Electronically Signed by           Alba Diamond DO  05/31/22 7901

## 2022-05-31 NOTE — ASSESSMENT & PLAN NOTE
· Present on admission as evidenced by tachycardia, tachypnea, leukocytosis WBC 22  · Afebrile, lactate normal   · COVID/flu/RSV negative  · Procalcitonin 0 67, continue to trend   · In ED, received Iv cepfepime and vanco x 1  · Will give 250cc bolus--patient with evidence of CHF and BMI 42   · F/U blood cultures, continue Rocephin and Zithromax   · Check UA    · Suspected source possible underlying pneumonia ?

## 2022-05-31 NOTE — CONSULTS
Consult received for CHF ed  Pt reports good appetite at home  Says he has reduced his salt intake, uses pink himalayan salt though  Wife reports he occassionally will have villafana or sausage though not often  Pt and wife report knowledge of foods high in sodium and tries to avoid these though does not appear consistent with it  Reports packing homegrown canned vegetables in salt and adding salt to potatoes "It just needs them " Encouraged avoidance  Noting pt has been noncompliant to fluid restriction, fluid amount per MD  Will provide further diet ed regarding fluid restriction as appropriate, pt complaining of being "dry" during discussion  Thank you for the consult, will follow up and reinforce diet ed as appropriate

## 2022-05-31 NOTE — ASSESSMENT & PLAN NOTE
· No wheezing present on exam, does not appear to be in acute exacerbation  · Of note, patient refusing all steroids due to negative reaction (agitation) in the past   · Continue Flovent and PRN albuterol nebs   · Continue Zithromax MWF as maintenance therapy   · Wean to baseline 2-3L NC as tolerated  · Pulmonary toilet, flutter valve TID

## 2022-05-31 NOTE — NURSING NOTE
Pt very agitated at staff, yelling and demanding medication  Pt c/o SOB, O2 sat 93% on 6L  Pt demanding this RN give him 3 nasal cannulas to wear at the same time so that he can get "adequate oxygen" like he does at home  RN educated patient on proper use of nasal cannula  Pt stated that he will get them himself  Pt requested a PRN nebulizer tx, respiratory called and administered tx to patient  Pt continues to c/o of SOB and swollen legs refuses to prone, lay on his side, or elevate legs  Pt states he cannot breathe  This RN suggested pt try to sit upright in recliner  Pt stated he cannot sit in the recliner  Pt agitated with staff due to staff not giving him more water  Pt is on a fluid restriction, pt has been given 360 mL of fluid since arrival to unit  This RN attemtped to educate patient on need for fluid restriction, pt still demands more water  Staff provided an additional 240 ml, explained to patient that drinking to much water will make is SOB and lower extremity edema worse  Pt states that he will just leave the hospital if we don't give him water  PATRICK Malhotra aware of same  Will continue to monitor

## 2022-05-31 NOTE — H&P
[address]  [facility] 1955, 78 y [facility]  male MRN: [id number]  Unit/Bed#: -01 Encounter: [id number]  Primary Care Provider: Daquan Cherry DO   Date and time admitted to hospital: 5/30/2022 11:11 PM    * Acute on chronic respiratory failure ([facility] )  Assessment & Plan  · Presents with increased SOB and O2 requirements over the last several days  Wears 2-3L NC at baseline, was wearing up to 11L at home with O2 sats in the 70's at home per patient  Recently treated with Doxycycline and Zithromax for suspected URI as outpatient  · XR showing possible bilateral pleural effusions, awaiting final read  · In ED, patient unable to get CTA chest due to being unable to lay flat  Ideally would like to try to get CT chest if breathing status improves to rule out underlying pneumonia/acute process, PE     · Differentials include CAP vs fluid overload/acute CHF vs PE  Does not appear to be in COPD exacerbation  · Continue Rocephin and Azithromycin to cover for CAP given leukocytosis and elevated procalcitonin   · Check sputum culture  · Consider critical care consult pending Xray read   · Wean O2 to keep SpO2 > 90%       Acute congestive heart failure [facility])  Assessment & Plan  Wt Readings from Last 3 Encounters:   05/31/22 135 kg (298 lb 11 6 oz)     · Last echo April 2021 shows EF 60% with G1DD  · Bilateral lower extremity edema present   · XR with possible bilateral pleural effusions, vascular congestion  Awaiting final read  · Home regimen is Maxzide, start Lasix 40mg BID     · Per chart review, patient has refused Lasix as outpatient in the past    · Update echo  · Daily weights, I&O's        Sepsis ([facility] )  Assessment & Plan  · Present on admission as evidenced by tachycardia, tachypnea, leukocytosis WBC 22  · Afebrile, lactate normal   · COVID/flu/RSV negative  · Procalcitonin 0 67, continue to trend   · In ED, received Iv cepfepime and vanco x 1  · Will give 250cc bolus--patient with evidence of CHF and BMI 42   · F/U blood cultures, continue Rocephin and Zithromax   · Check UA    · Suspected source possible underlying pneumonia ? COPD, severe ([address] )  Assessment & Plan    · No wheezing present on exam, does not appear to be in acute exacerbation  · Of note, patient refusing all steroids due to negative reaction (agitation) in the past   · Continue Flovent and PRN albuterol nebs   · Continue Zithromax MWF as maintenance therapy   · Wean to baseline 2-3L NC as tolerated  · Pulmonary toilet, flutter valve TID     Hypertension  Assessment & Plan  · Continue Losartan   · Hold Maxzide, continue Lasix     Tobacco abuse  Assessment & Plan  · Reports he quit smoking a couple of days prior to admission, previously 2 ppd   · Nicotine patch ordered, patient request    VTE Pharmacologic Prophylaxis:   Moderate Risk (Score 3-4) - Pharmacological DVT Prophylaxis Ordered: heparin  Code Status: Level 1 - Full Code   Discussion with family: Patient declined call to   Anticipated Length of Stay: Patient will be admitted on an inpatient basis with an anticipated length of stay of greater than 2 midnights secondary to acute on chronic resp failure, sepsis, chf exac  Total Time for Visit, including Counseling / Coordination of Care: 70 minutes Greater than 50% of this total time spent on direct patient counseling and coordination of care  Chief Complaint: SOB    History of Present Illness:  Sarah Ramirez is a 78 y o  male with a PMH of HTN, severe COPD, chronic respiratory failure on 2-3L NC, tobacco abuse who presents with  Shortness of breath and increased O2 requirements over the last several days  Reports he has been wearing up to 11 L nasal cannula, reporting his O2 sats were in the 70s at times  Reports he prefers to keep SpO2 > 93% , otherwise feels short of breath  Reports increased sputum production, cough and congestion   Reports recently treated with course of doxycycline and Zithromax as outpatient (took 1 week of Zithromax daily, otherwise takes Zithromax MWF chronically)  Recently quit smoking a few days ago  Takes Maxzide as water pill for lower extremity edema  Denies fevers, chills, chest pain, abdominal pain, diarrhea  In ED, tolerating 6-7L NC   Received Iv vanco and cefepime as well as 40 IV Lasix x 1  ED attempted to obtain CT chest, however patient unable to lay flat for testing  XR showing possible bilateral pleural effusions  Review of Systems:  Review of Systems   Constitutional: Positive for diaphoresis and fever  Negative for activity change, appetite change and chills  Respiratory: Positive for cough and shortness of breath  Cardiovascular: Positive for leg swelling  Negative for chest pain and palpitations  Gastrointestinal: Positive for nausea  Negative for abdominal pain, constipation, diarrhea and vomiting  Genitourinary: Negative for difficulty urinating  Musculoskeletal: Positive for neck pain  Chronic neck pain    Skin: Negative for color change  Neurological: Negative for dizziness, syncope, weakness, light-headedness and headaches  All other systems reviewed and are negative  Past Medical and Surgical History:   Past Medical History:   Diagnosis Date    COPD (chronic obstructive pulmonary disease) ([facility] )        History reviewed  No pertinent surgical history  Meds/Allergies:  Prior to Admission medications    Medication Sig Start Date End Date Taking?  Authorizing Provider   albuterol (2 5 mg/3 mL) 0 083 % nebulizer solution Inhale 2 5 mg 5/25/22  Yes Historical Provider, MD   albuterol (PROVENTIL HFA,VENTOLIN HFA) 90 mcg/act inhaler USE 2 INHALATIONS BY MOUTH  EVERY 4 HOURS AS NEEDED FOR WHEEZING 12/29/21  Yes Historical Provider, MD   aspirin 81 mg chewable tablet Chew 162 mg daily   Yes Historical Provider, MD   azithromycin (ZITHROMAX) 250 mg tablet Take 250 mg by mouth every other day 3/29/22 Yes Historical Provider, MD   cyanocobalamin (VITAMIN B-12) 500 MCG tablet Take 500 mcg by mouth   Yes Historical Provider, MD   fluticasone (Flovent HFA) 220 mcg/act inhaler Inhale 2 puffs 2 (two) times a day 3/15/22  Yes Historical Provider, MD   Multiple Vitamin (Multi Vitamin Daily) TABS Take 1 tablet by mouth daily   Yes Historical Provider, MD   olmesartan (BENICAR) 5 mg tablet Take 1 tablet by mouth daily 12/29/21  Yes Historical Provider, MD   Omega-3 Fatty Acids (fish oil) 1,000 mg Take 1,000 mg by mouth   Yes Historical Provider, MD   Oxymetazoline HCl (JOSE-SYNEPHRINE 12 HOUR SPRAY NA) into each nostril if needed Pt unsure of which dosage   Yes Historical Provider, MD   Probiotic Product (Misc Intestinal Kajal Regulat) CAPS Take 1 capsule by mouth   Yes Historical Provider, MD   selenium 50 MCG TABS Take 200 mcg by mouth daily   Yes Historical Provider, MD   triamterene-hydrochlorothiazide (MAXZIDE-25) 37 5-25 mg per tablet Take 1 tablet by mouth daily   Yes Historical Provider, MD   vitamin E, tocopherol, 400 units capsule Take 400 Units by mouth   Yes Historical Provider, MD     I have reviewed home medications with patient personally  Allergies: Allergies   Allergen Reactions    Other Anaphylaxis     bees       Social History:  Marital Status: /Civil Union   Patient Pre-hospital Living Situation: Home  Patient Pre-hospital Level of Mobility: walks  Substance Use History:   Social History     Substance and Sexual Activity   Alcohol Use Not Currently     Social History     Tobacco Use   Smoking Status Former Smoker    Packs/day: 1 00    Quit date: 5/28/2022   Smokeless Tobacco Never Used     Social History     Substance and Sexual Activity   Drug Use Not Currently       Family History:  History reviewed  No pertinent family history      Physical Exam:     Vitals:   Blood Pressure: 124/69 (05/31/22 0244)  Pulse: 104 (05/31/22 0244)  Temperature: 98 9 °F (37 2 °C) (05/31/22 0244)  Temp Source: Oral (05/31/22 0215)  Respirations: 20 (05/31/22 0244)  Height: 5' 11" (180 3 cm) (05/31/22 0107)  Weight - Scale: 135 kg (298 lb 11 6 oz) (05/31/22 0107)  SpO2: 93 % (05/31/22 0245)    Physical Exam  Vitals and nursing note reviewed  Constitutional:       General: He is not in acute distress  Appearance: He is obese  He is ill-appearing  He is not toxic-appearing  HENT:      Head: Normocephalic and atraumatic  Eyes:      Extraocular Movements: Extraocular movements intact  Pupils: Pupils are equal, round, and reactive to light  Cardiovascular:      Rate and Rhythm: Regular rhythm  Tachycardia present  Pulses: Normal pulses  Heart sounds: Normal heart sounds  Pulmonary:      Effort: No respiratory distress  Breath sounds: No wheezing, rhonchi or rales  Comments: Diminished bases, ANTON noted   Able to speak in full sentences, no acute resp distress  Abdominal:      General: Bowel sounds are normal  There is no distension  Palpations: Abdomen is soft  Tenderness: There is no abdominal tenderness  Musculoskeletal:         General: Normal range of motion  Cervical back: Normal range of motion and neck supple  Right lower leg: Edema present  Left lower leg: Edema present  Comments: +2-3 bilateral lower extremity edema    Skin:     General: Skin is warm and dry  Neurological:      General: No focal deficit present  Mental Status: He is alert and oriented to person, place, and time  Cranial Nerves: No cranial nerve deficit     Psychiatric:      Comments: anxious          Additional Data:     Lab Results:  Results from last 7 days   Lab Units 05/30/22  2329   WBC Thousand/uL 22 84*   HEMOGLOBIN g/dL 15 4   HEMATOCRIT % 47 8   PLATELETS Thousands/uL 281   NEUTROS PCT % 89*   LYMPHS PCT % 4*   MONOS PCT % 6   EOS PCT % 1     Results from last 7 days   Lab Units 05/30/22  2329   SODIUM mmol/L 135*   POTASSIUM mmol/L 3 5   CHLORIDE mmol/L 94*   CO2 mmol/L 32   BUN mg/dL 11   CREATININE mg/dL 0 63   ANION GAP mmol/L 9   CALCIUM mg/dL 9 3   ALBUMIN g/dL 3 5   TOTAL BILIRUBIN mg/dL 0 69   ALK PHOS U/L 79   ALT U/L 54   AST U/L 42   GLUCOSE RANDOM mg/dL 108     Results from last 7 days   Lab Units 05/30/22  2329   INR  1 01     Results from last 7 days   Lab Units 05/31/22  0150   POC GLUCOSE mg/dl 122         Results from last 7 days   Lab Units 05/30/22  2329   LACTIC ACID mmol/L 2 0   PROCALCITONIN ng/ml 0 67*       Imaging: Personally reviewed the following imaging: chest xray  XR chest 1 view portable   ED Interpretation by Amy Winston DO (05/31 0013)   Bilateral pleural effusions          EKG and Other Studies Reviewed on Admission:   · EKG: Sinus Tachycardia    ** Please Note: This note has been constructed using a voice recognition system   **

## 2022-05-31 NOTE — ASSESSMENT & PLAN NOTE
· No wheezing present on exam, does not appear to be in acute exacerbation  · Of note, patient refusing all steroids due to negative reaction (agitation) in the past   · Continue Flovent and PRN albuterol nebs   · Continue Zithromax MWF as maintenance therapy   · Wean to baseline 2-3L NC as tolerated  · Pulmonary toilet, flutter valve TID   · Tapering course of prednisone

## 2022-05-31 NOTE — ASSESSMENT & PLAN NOTE
· Presents with increased SOB and increased O2 requirements over the last several days  Wears 2-3L NC at baseline, was wearing up to 11L at home with O2 sats in the 70's at home per patient  Recently treated with Doxycycline and Zithromax for suspected URI as outpatient  · Chest X-ray shows pleural thickening  · In ED, patient unable to get CTA chest due to being unable to lay flat  Ideally would like to try to get CT chest if breathing status improves to rule out underlying pneumonia/acute process, PE     · Differentials include CAP vs fluid overload/acute CHF vs PE  Does not appear to be in COPD exacerbation  · Continue Rocephin and Azithromycin to cover for CAP given leukocytosis and elevated procalcitonin   · Check sputum culture  · Try to get CT PE study done today    · Wean O2 to keep SpO2 > 90%

## 2022-05-31 NOTE — ASSESSMENT & PLAN NOTE
Wt Readings from Last 3 Encounters:   05/31/22 135 kg (297 lb)     · Last echo April 2021 shows EF 60% with G1DD  2d echo this admission shows normal EF and mild aortic stenosis  · Bilateral lower extremity edema present   · Home regimen is Maxzide, start Lasix 40mg iv  BID     · Per chart review, patient has refused Lasix as outpatient in the past    · Daily weights, I&O's

## 2022-05-31 NOTE — ED NOTES
Pt refused to take ordered prednisone  States "it makes me crazy"  Dr Scottie Boogie aware        Liliana Mcdowell, SAMMI  05/30/22 0945

## 2022-06-01 LAB
ANION GAP SERPL CALCULATED.3IONS-SCNC: 6 MMOL/L (ref 4–13)
BUN SERPL-MCNC: 12 MG/DL (ref 5–25)
CALCIUM SERPL-MCNC: 9.1 MG/DL (ref 8.3–10.1)
CHLORIDE SERPL-SCNC: 96 MMOL/L (ref 100–108)
CO2 SERPL-SCNC: 35 MMOL/L (ref 21–32)
CREAT SERPL-MCNC: 0.62 MG/DL (ref 0.6–1.3)
GFR SERPL CREATININE-BSD FRML MDRD: 102 ML/MIN/1.73SQ M
GLUCOSE SERPL-MCNC: 111 MG/DL (ref 65–140)
MRSA NOSE QL CULT: NORMAL
POTASSIUM SERPL-SCNC: 3.5 MMOL/L (ref 3.5–5.3)
SODIUM SERPL-SCNC: 137 MMOL/L (ref 136–145)

## 2022-06-01 PROCEDURE — 94640 AIRWAY INHALATION TREATMENT: CPT

## 2022-06-01 PROCEDURE — 94664 DEMO&/EVAL PT USE INHALER: CPT

## 2022-06-01 PROCEDURE — 80048 BASIC METABOLIC PNL TOTAL CA: CPT | Performed by: FAMILY MEDICINE

## 2022-06-01 PROCEDURE — 99233 SBSQ HOSP IP/OBS HIGH 50: CPT | Performed by: FAMILY MEDICINE

## 2022-06-01 PROCEDURE — 94668 MNPJ CHEST WALL SBSQ: CPT

## 2022-06-01 PROCEDURE — 94760 N-INVAS EAR/PLS OXIMETRY 1: CPT

## 2022-06-01 RX ORDER — FUROSEMIDE 40 MG/1
40 TABLET ORAL DAILY
Status: DISCONTINUED | OUTPATIENT
Start: 2022-06-01 | End: 2022-06-03

## 2022-06-01 RX ORDER — CEFTRIAXONE 2 G/50ML
2000 INJECTION, SOLUTION INTRAVENOUS EVERY 24 HOURS
Status: DISCONTINUED | OUTPATIENT
Start: 2022-06-01 | End: 2022-06-03 | Stop reason: HOSPADM

## 2022-06-01 RX ORDER — POLYETHYLENE GLYCOL 3350 17 G/17G
17 POWDER, FOR SOLUTION ORAL DAILY PRN
Status: DISCONTINUED | OUTPATIENT
Start: 2022-06-01 | End: 2022-06-03 | Stop reason: HOSPADM

## 2022-06-01 RX ORDER — AMOXICILLIN 250 MG
1 CAPSULE ORAL DAILY PRN
Status: DISCONTINUED | OUTPATIENT
Start: 2022-06-01 | End: 2022-06-03 | Stop reason: HOSPADM

## 2022-06-01 RX ADMIN — IPRATROPIUM BROMIDE AND ALBUTEROL SULFATE 3 ML: 2.5; .5 SOLUTION RESPIRATORY (INHALATION) at 13:06

## 2022-06-01 RX ADMIN — FUROSEMIDE 40 MG: 40 TABLET ORAL at 14:21

## 2022-06-01 RX ADMIN — CEFTRIAXONE 2000 MG: 2 INJECTION, SOLUTION INTRAVENOUS at 14:14

## 2022-06-01 RX ADMIN — Medication 1 TABLET: at 09:58

## 2022-06-01 RX ADMIN — MORPHINE SULFATE 2 MG: 2 INJECTION, SOLUTION INTRAMUSCULAR; INTRAVENOUS at 03:44

## 2022-06-01 RX ADMIN — LOSARTAN POTASSIUM 12.5 MG: 25 TABLET, FILM COATED ORAL at 09:58

## 2022-06-01 RX ADMIN — HEPARIN SODIUM 5000 UNITS: 5000 INJECTION INTRAVENOUS; SUBCUTANEOUS at 14:22

## 2022-06-01 RX ADMIN — IPRATROPIUM BROMIDE AND ALBUTEROL SULFATE 3 ML: 2.5; .5 SOLUTION RESPIRATORY (INHALATION) at 07:29

## 2022-06-01 RX ADMIN — HEPARIN SODIUM 5000 UNITS: 5000 INJECTION INTRAVENOUS; SUBCUTANEOUS at 06:15

## 2022-06-01 RX ADMIN — ASPIRIN 81 MG CHEWABLE TABLET 81 MG: 81 TABLET CHEWABLE at 09:58

## 2022-06-01 RX ADMIN — LIDOCAINE 5% 1 PATCH: 700 PATCH TOPICAL at 03:44

## 2022-06-01 RX ADMIN — AZITHROMYCIN 250 MG: 250 TABLET, FILM COATED ORAL at 14:21

## 2022-06-01 RX ADMIN — FLUTICASONE PROPIONATE 2 PUFF: 220 AEROSOL, METERED RESPIRATORY (INHALATION) at 18:43

## 2022-06-01 RX ADMIN — ALUMINUM HYDROXIDE, MAGNESIUM HYDROXIDE, AND SIMETHICONE 30 ML: 200; 200; 20 SUSPENSION ORAL at 04:21

## 2022-06-01 RX ADMIN — HEPARIN SODIUM 5000 UNITS: 5000 INJECTION INTRAVENOUS; SUBCUTANEOUS at 21:25

## 2022-06-01 RX ADMIN — CYANOCOBALAMIN TAB 500 MCG 500 MCG: 500 TAB at 09:58

## 2022-06-01 RX ADMIN — FLUTICASONE PROPIONATE 2 PUFF: 220 AEROSOL, METERED RESPIRATORY (INHALATION) at 09:58

## 2022-06-01 RX ADMIN — IPRATROPIUM BROMIDE AND ALBUTEROL SULFATE 3 ML: 2.5; .5 SOLUTION RESPIRATORY (INHALATION) at 19:46

## 2022-06-01 NOTE — NURSING NOTE
During my med pass, the patient stated he had pain in his base of his neck and asked for his Lidoderm patch and pain medications that helps  He also asked for his Nicoderm patch to be placed on as well when I was in there  He did not have any other complaints or concerns while I was in talking care of him at that time

## 2022-06-01 NOTE — NURSING NOTE
RN entered pt room to administer IV antibiotics  RN explained that abx would take about 30 minutes to administer and when they were finished, RN would unhook pt from IV pump  Pt agreeable  Pt rang and asked RN if he could take a shower  RN explained that when the antibiotics were finished, pt would be able to shower  RN instructed patient that the antibiotics still had a some time left until they were finished and that this RN would return upon completion  Pt became argumentative stating that the IV antibiotics being infused were "interrupting his window of opportunity" to have a bowel movement  RN explained that pt could use the bathroom, but that the IV pump/pole would have to go into the bathroom with him  Pt agreeable to wait until abx were finished  Upon completion, the patient rang the call bell  This RN promptly answered call bell and began to unhook the pt from the IV pump  RN removed masimo and covered IV site in preparation for pt to shower  Pt again complained that it was interrupting his "window of opportunity"  RN explained that pt could have used the bathroom while the abx were infusing  Pt disagreeable and became argumentative stating that this RN ruined his ability to have a BM  RN completed task and pt walked into the bathroom  Once pt was in the bathroom, he rang the call bell again  The PCA answered the call bell and pt stated that he felt SOB  Pt refused to allow PCA to put masimo back on to check O2  PCA obtained her personal pulse ox and checked pt O2  Pt O2 level 94%  Pt demanded to keep PCA personal pulse ox in the room with him  PCA stated that she could not do that, she needed to use it for other patients as well  Pt became angry and questioned the PCA whether the PCA trusted him to not steal per pulse ox  PCA again explained that she needed it for other patients  PCA explained that masimo could be used to measure pulse ox  Pt disagreeable, refused masimo   This RN entered pt room, explained the need to use masimo while pt was in the bathroom  Pt angry, yelled at this RN stating that he could no longer have a BM that he was dehydrated  RN applied masimo  O2 sat 92%  Pt began "bearing down" and O2 sat decreased  RN told pt that he should not bear down so hard that he could have a vasovagal response and pass out on the toilet  Pt angrily told RN to shut up that has "been around longer than you have" and that this RN doesn't know what she is talking about  RN left room, call bell at pt side in BR  Will continue to monitor

## 2022-06-01 NOTE — NURSING NOTE
Nurse and PCA had went into patient room to help him setup for shower  Nurse and PCA had covered up his IV and unhooked Pt from [employer] system  Patient had rang an hour after leaving the room PCA had walked in and Pt stated his oxygen was low PCA had offered to hook Pt up to the [employer] system to check the levels and for when he was going to be in the bathroom  Pt had refused but needed his oxygen checked  PCA had grabbed a hand held pulse ox machine and PT had asked to keep it in his room until he was done PCA had stated that the Pulse ox was needed to come out of his room just Incase another Pt had needed their Pulse ox checked  PT had been upset by this so the PCA had reached out to the nurse and the nurse had to talk with him and hook him back up to [employer] for the time being  PT had started calling the nurse and PCA names to another PCA

## 2022-06-01 NOTE — ASSESSMENT & PLAN NOTE
· Presents with increased SOB and increased O2 requirements over the last several days  Wears 2-3L NC at baseline, was wearing up to 11L at home with O2 sats in the 70's at home per patient  Recently treated with Doxycycline and Zithromax for suspected URI as outpatient  · Chest X-ray shows pleural thickening  · Etiology is community-acquired pneumonia and severe emphysema underlying obstructive sleep apnea untreated  · Continue Rocephin and Azithromycin to cover for CAP given leukocytosis and elevated procalcitonin   · sputum culture no growth, negative Legionella and pneumococcal antigen  · PE study is negative for pulmonary embolism and shows severe emphysema and consolidation in the left lower lobe and right middle lobe consistent with pneumonia  · Wean O2 to keep SpO2 > 90%     Will do ambulatory O2 requirement study tomorrow and consult placed to pulmonology  · Counseled extensively on smoking cessation

## 2022-06-01 NOTE — ASSESSMENT & PLAN NOTE
· Reports he quit smoking a couple of days prior to admission, previously 2 ppd   · Nicotine patch ordered, patient request   Reyes Days extensively on smoking cessation

## 2022-06-01 NOTE — PROGRESS NOTES
[address]  Progress Note Jessica Ramirez 1955, 78 y o  male MRN: [id number]  Unit/Bed#: -01 Encounter: [id number]  Primary Care Provider: Amandeep Parks DO   Date and time admitted to hospital: 5/30/2022 11:11 PM    * Acute on chronic respiratory failure ([facility] )  Assessment & Plan  · Presents with increased SOB and increased O2 requirements over the last several days  Wears 2-3L NC at baseline, was wearing up to 11L at home with O2 sats in the 70's at home per patient  Recently treated with Doxycycline and Zithromax for suspected URI as outpatient  · Chest X-ray shows pleural thickening  · Etiology is community-acquired pneumonia and severe emphysema underlying obstructive sleep apnea untreated  · Continue Rocephin and Azithromycin to cover for CAP given leukocytosis and elevated procalcitonin   · sputum culture no growth, negative Legionella and pneumococcal antigen  · PE study is negative for pulmonary embolism and shows severe emphysema and consolidation in the left lower lobe and right middle lobe consistent with pneumonia  · Wean O2 to keep SpO2 > 90%   Will do ambulatory O2 requirement study tomorrow and consult placed to pulmonology  · Counseled extensively on smoking cessation      Acute diastolic (congestive) heart failure [facility])  Assessment & Plan  Wt Readings from Last 3 Encounters:   06/01/22 131 kg (289 lb 11 oz)     · Last echo April 2021 shows EF 60% with G1DD  2d echo this admission shows normal EF and mild aortic stenosis  · Bilateral lower extremity edema present which is now clinically improving  · Home regimen is Maxzide, start Lasix 40mg iv  BID    Appears to be improving and hence DC IV Lasix and placed on Lasix 40 mg oral daily  · Per chart review, patient has refused Lasix as outpatient in the past    · Daily weights, I&O's        Morbid obesity with BMI of 40 0-44 9, adult ([facility] )  Assessment & Plan  BMI is 41 42 given counseling on diet exercise and lifestyle modification    Tobacco abuse  Assessment & Plan  · Reports he quit smoking a couple of days prior to admission, previously 2 ppd   · Nicotine patch ordered, patient request   Nilo cShmitt extensively on smoking cessation     Essential hypertension  Assessment & Plan  · Blood pressure controlled  Continue Losartan   · Hold Maxzide, continue Lasix     COPD, severe (HCC)  Assessment & Plan    · No wheezing present on exam, does not appear to be in acute exacerbation  · Of note, patient refusing all steroids due to negative reaction (agitation) in the past   · Continue Flovent and PRN albuterol nebs   · Continue Zithromax MWF as maintenance therapy   · Wean to baseline 2-3L NC as tolerated  · Pulmonary toilet, flutter valve TID       VTE Pharmacologic Prophylaxis:   Pharmacologic: Heparin  Mechanical VTE Prophylaxis in Place: Yes    Patient Centered Rounds: I have performed bedside rounds with nursing staff today  Discussions with Specialists or Other Care Team Provider:  None    Education and Discussions with Family / Patient:  Discussed with patient and wife at bedside    Time Spent for Care: 45 minutes  More than 50% of total time spent on counseling and coordination of care as described above  Current Length of Stay: 1 day(s)    Current Patient Status: Inpatient   Certification Statement: The patient will continue to require additional inpatient hospital stay due to Acute on chronic respiratory failure with hypoxia    Discharge Plan:  Anticipate discharge in 24-48 hours pending progress  Ambulatory O2 requirement study for tomorrow    Code Status: Level 1 - Full Code      Subjective:   Patient states that he is feeling a little bit better  Still bringing up a lot of phlegm  Denies any fevers or chills  Unable to lay flat      Objective:     Vitals:   Temp (24hrs), Av 2 °F (36 8 °C), Min:98 1 °F (36 7 °C), Max:98 2 °F (36 8 °C)    Temp:  [98 1 °F (36 7 °C)-98 2 °F (36 8 °C)] 98 2 °F (36 8 °C)  HR:  [87-98] 89  Resp:  [18-22] 18  BP: (119-132)/(64-67) 132/67  SpO2:  [91 %-95 %] 93 %  Body mass index is 40 4 kg/m²  Input and Output Summary (last 24 hours): Intake/Output Summary (Last 24 hours) at 6/1/2022 1253  Last data filed at 6/1/2022 1002  Gross per 24 hour   Intake 690 ml   Output 2500 ml   Net -1810 ml       Physical Exam:     Physical Exam  Vitals and nursing note reviewed  Constitutional:       Appearance: He is obese  He is ill-appearing  HENT:      Head: Normocephalic and atraumatic  Right Ear: External ear normal       Left Ear: External ear normal       Nose: Nose normal       Mouth/Throat:      Pharynx: Oropharynx is clear  Eyes:      Pupils: Pupils are equal, round, and reactive to light  Cardiovascular:      Rate and Rhythm: Normal rate and regular rhythm  Heart sounds: Normal heart sounds  Pulmonary:      Effort: Pulmonary effort is normal       Comments: Moderate air entry bilaterally with decreased breath sounds bilateral bases  Abdominal:      General: Bowel sounds are normal       Palpations: Abdomen is soft  Tenderness: There is no abdominal tenderness  Musculoskeletal:         General: Normal range of motion  Cervical back: Normal range of motion and neck supple  Skin:     General: Skin is warm and dry  Capillary Refill: Capillary refill takes less than 2 seconds  Neurological:      General: No focal deficit present  Mental Status: He is alert and oriented to person, place, and time     Psychiatric:         Mood and Affect: Mood normal            Additional Data:     Labs:    Results from last 7 days   Lab Units 05/31/22  0518   WBC Thousand/uL 37 32*   HEMOGLOBIN g/dL 14 6   HEMATOCRIT % 45 2   PLATELETS Thousands/uL 280   NEUTROS PCT % 89*   LYMPHS PCT % 4*   MONOS PCT % 6   EOS PCT % 0     Results from last 7 days   Lab Units 06/01/22  0511 05/30/22  2329   SODIUM mmol/L 137 135*   POTASSIUM mmol/L 3 5 3 5 CHLORIDE mmol/L 96* 94*   CO2 mmol/L 35* 32   BUN mg/dL 12 11   CREATININE mg/dL 0 62 0 63   ANION GAP mmol/L 6 9   CALCIUM mg/dL 9 1 9 3   ALBUMIN g/dL  --  3 5   TOTAL BILIRUBIN mg/dL  --  0 69   ALK PHOS U/L  --  79   ALT U/L  --  54   AST U/L  --  42   GLUCOSE RANDOM mg/dL 111 108     Results from last 7 days   Lab Units 05/30/22  2329   INR  1 01     Results from last 7 days   Lab Units 05/31/22  0150   POC GLUCOSE mg/dl 122         Results from last 7 days   Lab Units 05/31/22  0518 05/30/22  2329   LACTIC ACID mmol/L  --  2 0   PROCALCITONIN ng/ml 2 37* 0 67*           * I Have Reviewed All Lab Data Listed Above  * Additional Pertinent Lab Tests Reviewed: Twila 65 Admission Reviewed    Imaging:    Imaging Reports Reviewed Today Include:  CTA chest  Imaging Personally Reviewed by Myself Includes:  CTa chest    Recent Cultures (last 7 days):     Results from last 7 days   Lab Units 05/31/22  1615 05/31/22  0312 05/30/22  2339 05/30/22  2329   BLOOD CULTURE   --   --  No Growth at 24 hrs  No Growth at 24 hrs     GRAM STAIN RESULT  4+ Polys  No organisms seen  --   --   --    LEGIONELLA URINARY ANTIGEN   --  Negative  --   --        Last 24 Hours Medication List:   Current Facility-Administered Medications   Medication Dose Route Frequency Provider Last Rate    albuterol  2 5 mg Nebulization Q4H PRN ZolAMALIA Arevalo      aluminum-magnesium hydroxide-simethicone  30 mL Oral Q4H PRN AMALIA Bowen      aspirin  81 mg Oral Daily AMALIA Bowen      azithromycin  250 mg Oral Q24H Olimpia Calderon MD      cefTRIAXone  2,000 mg Intravenous Q24H Olimpia Calderon MD      cyanocobalamin  500 mcg Oral Daily AMALIA Bowen      fluticasone  2 puff Inhalation BID AMALIA Bowen      furosemide  40 mg Oral Daily Olimpia Calderon MD      guaiFENesin  600 mg Oral BID ZolAMALIA Arevalo      heparin (porcine)  5,000 Units Subcutaneous [facility] AMALIA Bowen      ipratropium-albuterol  3 mL Nebulization Q6H Mariluz Mariano MD      lidocaine  1 patch Topical Daily General Ask, CRNP      losartan  12 5 mg Oral Daily General Ask, [address]      morphine injection  2 mg Intravenous Q4H PRN Mariluz Mariano MD      multivitamin-minerals  1 tablet Oral Daily General Ask, CRNP      nicotine  21 mg Transdermal Daily General Ask, CRNP      ondansetron  4 mg Intravenous Q6H PRN General Ask, CRNP      oxymetazoline  2 spray Each Nare Q12H PRN General Ask, CRNP          Today, Patient Was Seen By: Mariluz Mariano MD    ** Please Note: Dictation voice to text software may have been used in the creation of this document   **

## 2022-06-02 ENCOUNTER — APPOINTMENT (INPATIENT)
Dept: RADIOLOGY | Facility: HOSPITAL | Age: 67
DRG: 871 | End: 2022-06-02
Payer: MEDICARE

## 2022-06-02 ENCOUNTER — APPOINTMENT (INPATIENT)
Dept: NON INVASIVE DIAGNOSTICS | Facility: HOSPITAL | Age: 67
DRG: 871 | End: 2022-06-02
Payer: MEDICARE

## 2022-06-02 LAB
ANION GAP SERPL CALCULATED.3IONS-SCNC: 5 MMOL/L (ref 4–13)
BACTERIA SPT RESP CULT: NORMAL
BUN SERPL-MCNC: 13 MG/DL (ref 5–25)
CALCIUM SERPL-MCNC: 8.8 MG/DL (ref 8.3–10.1)
CHLORIDE SERPL-SCNC: 97 MMOL/L (ref 100–108)
CO2 SERPL-SCNC: 36 MMOL/L (ref 21–32)
CREAT SERPL-MCNC: 0.59 MG/DL (ref 0.6–1.3)
ERYTHROCYTE [DISTWIDTH] IN BLOOD BY AUTOMATED COUNT: 13.9 % (ref 11.6–15.1)
GFR SERPL CREATININE-BSD FRML MDRD: 104 ML/MIN/1.73SQ M
GLUCOSE SERPL-MCNC: 100 MG/DL (ref 65–140)
GRAM STN SPEC: NORMAL
GRAM STN SPEC: NORMAL
HCT VFR BLD AUTO: 43.7 % (ref 36.5–49.3)
HGB BLD-MCNC: 13.9 G/DL (ref 12–17)
MCH RBC QN AUTO: 29.8 PG (ref 26.8–34.3)
MCHC RBC AUTO-ENTMCNC: 31.8 G/DL (ref 31.4–37.4)
MCV RBC AUTO: 94 FL (ref 82–98)
PLATELET # BLD AUTO: 285 THOUSANDS/UL (ref 149–390)
PMV BLD AUTO: 9.9 FL (ref 8.9–12.7)
POTASSIUM SERPL-SCNC: 3.5 MMOL/L (ref 3.5–5.3)
RBC # BLD AUTO: 4.66 MILLION/UL (ref 3.88–5.62)
SODIUM SERPL-SCNC: 138 MMOL/L (ref 136–145)
WBC # BLD AUTO: 12.08 THOUSAND/UL (ref 4.31–10.16)

## 2022-06-02 PROCEDURE — 94761 N-INVAS EAR/PLS OXIMETRY MLT: CPT

## 2022-06-02 PROCEDURE — 94640 AIRWAY INHALATION TREATMENT: CPT

## 2022-06-02 PROCEDURE — 99233 SBSQ HOSP IP/OBS HIGH 50: CPT | Performed by: FAMILY MEDICINE

## 2022-06-02 PROCEDURE — 85027 COMPLETE CBC AUTOMATED: CPT | Performed by: FAMILY MEDICINE

## 2022-06-02 PROCEDURE — 71045 X-RAY EXAM CHEST 1 VIEW: CPT

## 2022-06-02 PROCEDURE — 80048 BASIC METABOLIC PNL TOTAL CA: CPT | Performed by: FAMILY MEDICINE

## 2022-06-02 PROCEDURE — 99223 1ST HOSP IP/OBS HIGH 75: CPT | Performed by: INTERNAL MEDICINE

## 2022-06-02 PROCEDURE — 94760 N-INVAS EAR/PLS OXIMETRY 1: CPT

## 2022-06-02 RX ORDER — GUAIFENESIN/DEXTROMETHORPHAN 100-10MG/5
10 SYRUP ORAL EVERY 4 HOURS PRN
Status: DISCONTINUED | OUTPATIENT
Start: 2022-06-02 | End: 2022-06-03 | Stop reason: HOSPADM

## 2022-06-02 RX ADMIN — LOSARTAN POTASSIUM 12.5 MG: 25 TABLET, FILM COATED ORAL at 09:39

## 2022-06-02 RX ADMIN — GUAIFENESIN AND DEXTROMETHORPHAN 10 ML: 100; 10 SYRUP ORAL at 17:29

## 2022-06-02 RX ADMIN — HEPARIN SODIUM 5000 UNITS: 5000 INJECTION INTRAVENOUS; SUBCUTANEOUS at 04:54

## 2022-06-02 RX ADMIN — IPRATROPIUM BROMIDE AND ALBUTEROL SULFATE 3 ML: 2.5; .5 SOLUTION RESPIRATORY (INHALATION) at 14:09

## 2022-06-02 RX ADMIN — FLUTICASONE PROPIONATE 2 PUFF: 220 AEROSOL, METERED RESPIRATORY (INHALATION) at 09:49

## 2022-06-02 RX ADMIN — IPRATROPIUM BROMIDE AND ALBUTEROL SULFATE 3 ML: 2.5; .5 SOLUTION RESPIRATORY (INHALATION) at 08:26

## 2022-06-02 RX ADMIN — FUROSEMIDE 40 MG: 40 TABLET ORAL at 09:40

## 2022-06-02 RX ADMIN — IPRATROPIUM BROMIDE AND ALBUTEROL SULFATE 3 ML: 2.5; .5 SOLUTION RESPIRATORY (INHALATION) at 19:31

## 2022-06-02 RX ADMIN — CEFTRIAXONE 2000 MG: 2 INJECTION, SOLUTION INTRAVENOUS at 13:49

## 2022-06-02 RX ADMIN — POLYETHYLENE GLYCOL 3350 17 G: 17 POWDER, FOR SOLUTION ORAL at 16:42

## 2022-06-02 RX ADMIN — DOCUSATE SODIUM AND SENNOSIDES 1 TABLET: 8.6; 5 TABLET, FILM COATED ORAL at 16:42

## 2022-06-02 RX ADMIN — HEPARIN SODIUM 5000 UNITS: 5000 INJECTION INTRAVENOUS; SUBCUTANEOUS at 21:35

## 2022-06-02 RX ADMIN — HEPARIN SODIUM 5000 UNITS: 5000 INJECTION INTRAVENOUS; SUBCUTANEOUS at 13:48

## 2022-06-02 RX ADMIN — FLUTICASONE PROPIONATE 2 PUFF: 220 AEROSOL, METERED RESPIRATORY (INHALATION) at 17:29

## 2022-06-02 RX ADMIN — NICOTINE 21 MG: 21 PATCH, EXTENDED RELEASE TRANSDERMAL at 09:40

## 2022-06-02 RX ADMIN — LIDOCAINE 5% 1 PATCH: 700 PATCH TOPICAL at 09:41

## 2022-06-02 RX ADMIN — ASPIRIN 81 MG CHEWABLE TABLET 81 MG: 81 TABLET CHEWABLE at 09:39

## 2022-06-02 RX ADMIN — Medication 1 TABLET: at 09:54

## 2022-06-02 RX ADMIN — CYANOCOBALAMIN TAB 500 MCG 500 MCG: 500 TAB at 09:39

## 2022-06-02 RX ADMIN — AZITHROMYCIN 250 MG: 250 TABLET, FILM COATED ORAL at 13:48

## 2022-06-02 NOTE — ASSESSMENT & PLAN NOTE
· Present on admission as evidenced by tachycardia, tachypnea, leukocytosis WBC 22  · Afebrile, lactate normal   · COVID/flu/RSV negative  · Procalcitonin 0 67, 2 37  · In ED, received Iv cepfepime and vanco x 1  · In ed received 250cc bolus--patient with evidence of CHF and BMI 42   · F/U blood cultures neg, continue Rocephin and Zithromax   · Suspected source underlying pneumonia   Consult placed to pulmonology

## 2022-06-02 NOTE — CONSULTS
Pulmonary Medicine-Consultation  Tyler Ramirez 78 y o  male MRN: [id number]  Unit/Bed#: -01 Encounter: [id number]      Assessment/Plan:    1  Acute on chronic hypoxic respiratory failure-likely mixed etiology from# 2, 3, and 4  2  COPD W/exacerbation-suspect exposure to shock all fumes before admission likely precipitated an exacerbation  3  Bilateral lower lobe pneumonia-bilateral bronchograms at the lower bases  States that he could not tolerate CT chest for lung cancer screening in the past, cannot exclude underlying infiltrates/suspicious lesions  However, presentation is likely pneumonia with leukocytosis, elevated procalcitonin was worsening chest congestion  4  Diastolic heart failure  5  Morbid obesity  6  Tobacco abuse-at least 50 pack year, quit 1 week ago    Recommendations  · Agree with current coverage of antibiotics azithromycin/ceftriaxone, needs 10-12 days of therapy given the recent antibiotics intake  · Other options to consider is Levaquin since he would not be on steroids  · Needs a 6-8 weeks repeat imaging to ensure resolution of the pulmonary infiltrates  · Does not like to have steroids, also no wheezing on exam so does not appear to be in exacerbation currently    States that he developed psychosis with steroids intake  · Continue DuoNeb q 6, may discontinue the Flovent given the pneumonia  · On discharge, I recommended nebulized only treatment with budesonide/formoterol q 12 instead of the Flovent and he seems to be agreeable  · Continue to trend procalcitonin  · Agree with gentle diuresis, or at least avoid positive fluid balance  · If patient is agreeable, may consider ABG in the a m  on home supplemental oxygen at night to see if she can qualify for BiPAP, suspect chronic hypercapnia from elevated bicarb      Physician Requesting Consult: Mariluz Mariano MD    Reason for Consult / Principal Problem:  Bilateral pneumonia    HPI:   78 y o  male with a h/o HTN, morbid obesity, diastolic heart failure, severe COPD, chronic respiratory failure on 2-3 LPM, tobacco abuse    Presented to the ED on 05/31 with this worsening dyspnea, hypoxic respiratory failure required 11 LPM   Reported associations with cough, chest congestion and sputum production  Before that, he was treated as an outpatient with doxycycline/Zithromax for a week  Admitted to Internal Medicine, started on treatment for community-acquired pneumonia  Noted leukocytosis, elevated procalcitonin  Sputum cultures without growth, negative urine antigens for strep pneumoniae/Legionella  [place] [facility] [place]  At last evaluation by telemedicine on 03/15, advised to increase Flovent to 220, referred to heart failure clinic  On my assessment, patient is sitting at the edge of the bed  Reports feeling better compared to the admission time, easier to breathe but not back to baseline yet  Still with chest congestion, minimally productive cough  He states that he has been sick with pneumonia like symptoms for approximately 4-5 weeks, reported exposure to a sick contact 1 of his home visiting nurse  Treated with couple rounds of antibiotic azithromycin/doxycycline  On Memorial Day, he lit up a shock call, reports feeling severe dyspnea with exposure to the fumes and had to stop and sit down, checked SpO2 that was profoundly low in the 80s  At baseline, using 2-3 LPM supplemental oxygen, using Flovent high-dose as prescribed by his primary pulmonologist   Feeling better here with nebulized treatment  Smoked for about 50 years, between 1 and 2 packs per day, quit 1 week ago  Used to work at [employer] as a   Inpatient consult to Pulmonology  Consult performed by: Liane Tafoya MD  Consult ordered by:  Adan Cherry MD          Review of Systems  As per hpi, all other systems reviewed and were negative      Studies:    Imaging Studies: I have personally reviewed pertinent films in PACS  CT PE 2022-severe emphysema  RML and LLL consolidation/with air bronchogram suggesting pneumonia  Reactive mediastinal lymph node    EKG, Pathology, and Other Studies: I have personally reviewed pertinent films in PACS    Pulmonary Results (PFTs, PSG):   PFT 2021-ratio 60 percent, FEV1 1 18 liters/34 percent, FVC 1 96 liters/43 percent  TLC 64 percent,  percent  DLCO 24 percent    Historical Information   Past Medical History:   Diagnosis Date    COPD (chronic obstructive pulmonary disease) ([facility] [address] )      History reviewed  No pertinent surgical history  Social History   Social History     Substance and Sexual Activity   Alcohol Use Not Currently     Social History     Substance and Sexual Activity   Drug Use Not Currently     Social History     Tobacco Use   Smoking Status Former Smoker    Packs/day: 1     Quit date: 2022    Years since quittin 0   Smokeless Tobacco Never Used     E-Cigarette/Vaping     E-Cigarette/Vaping Substances         Family History: History reviewed  No pertinent family history  Meds/Allergies   all current active meds have been reviewed    Allergies   Allergen Reactions    Other Anaphylaxis     bees       Objective   Vitals: Blood pressure 131/70, pulse 96, temperature 98 3 °F (36 8 °C), resp  rate 19, height 5' 11" (1 803 m), weight 132 kg (290 lb), SpO2 93 %  ,Body mass index is 40 45 kg/m²  Intake/Output Summary (Last 24 hours) at 2022 1354  Last data filed at 2022 1329  Gross per 24 hour   Intake 2040 ml   Output 2550 ml   Net -510 ml     Invasive Devices  Report    Peripheral Intravenous Line  Duration           Peripheral IV 22 Left Hand 1 day                Physical Exam  Body mass index is 40 45 kg/m²     Gen: not in acute distress, morbidly obese  Neck/Eyes: supple, no adenopathy, PERRL  Ear: normal appearance, no significant hearing impairment  Nose:  normal nasal mucosa, no drainage  Mouth: unremarkable/normal appearance of lips, teeth and gums  Oropharynx: mucosa is moist, no focal lesions or erythema  Salivary glands: soft nontender  Chest: normal respiratory efforts, diffusely diminished, mild/fine crackles at the bases/posteriorly  CV:  no murmurs appreciated, 1+ pitting above ankle edema  Abdomen: soft, non tender  Extremities:  No observed deformity   Skin: unremarkable  Neuro: AAO X3, no focal motor deficit       Lab Results: I have personally reviewed pertinent lab results  Counseling/Coordination of Care: Total floor / unit time spent today 4 minutes  Greater than 50% of total time was spent with the patient and / or family counseling and / or coordination of care  A description of the counseling / coordination of care: Smoking cessation    Portions of the record may have been created with voice recognition software  Occasional wrong word or "sound a like" substitutions may have occurred due to the inherent limitations of voice recognition software  Read the chart carefully and recognize, using context, where substitutions have occurred

## 2022-06-02 NOTE — RESPIRATORY THERAPY NOTE
Pt has been very argumentative and disrespectful   Stated  that it only takes 18 month to do what I do  Wanted his wife to take the aero eclipse home , said we have plenty I and can new ones when I came back  Claims to want TXs however lays the nebulizer on the side table the entire time it runs  When I explained that I would be walking him to qualify him for home O2 he ask if he needed a leash    Pt is very difficult to communicate with states everyone here has been coming at him

## 2022-06-02 NOTE — RESPIRATORY THERAPY NOTE
Home Oxygen Qualifying Test     Patient name: Cheyenne Purcell        : 1955   Date of Test:  2022  Diagnosis:    Home Oxygen Test:    **Medicare Guidelines require item(s) 1-5 on all ambulatory patients or 1 and 2 on non-ambulatory patients  1  Baseline SPO2 on Room Air at rest  %   a  If <= 88% on Room Air add O2 via NC to obtain SpO2 >=88%  If LPM needed, document LPM  needed to reach =>88%    2  SPO2 during exertion on Room Air   %  a  During exertion monitor SPO2  If SPO2 increases >=89%, do not add supplemental oxygen    3  SPO2 on Oxygen at Rest 92 % at 2 LPM    4  SPO2 during exertion on Oxygen 83 % at 6 LPM    5  Test performed during exertion activity  [x]  Supplemental Home Oxygen is indicated  []  Client does not qualify for home oxygen  Respiratory Additional Notes-  Pt uses 2L at rest ATC   Desaturated to 83% on 6L while ambulating     [employer], RT

## 2022-06-02 NOTE — ASSESSMENT & PLAN NOTE
· Reports he quit smoking a couple of days prior to admission, previously 2 ppd   · Nicotine patch ordered, patient request   Bere Talbotie extensively on smoking cessation

## 2022-06-02 NOTE — ASSESSMENT & PLAN NOTE
Wt Readings from Last 3 Encounters:   06/02/22 132 kg (290 lb)     · Last echo April 2021 shows EF 60% with G1DD  2d echo this admission shows normal EF and mild aortic stenosis  · Bilateral lower extremity edema present which is now clinically improving  · Home regimen is Maxzide, start Lasix 40mg iv  BID    Appears to be improving and hence DC IV Lasix and placed on Lasix 40 mg oral daily  · Per chart review, patient has refused Lasix as outpatient in the past    · Daily weights, I&O's

## 2022-06-02 NOTE — PLAN OF CARE
Problem: Potential for Falls  Goal: Patient will remain free of falls  Description: INTERVENTIONS:  - Educate patient/family on patient safety including physical limitations  - Instruct patient to call for assistance with activity   - Consult OT/PT to assist with strengthening/mobility   - Keep Call bell within reach  - Keep bed low and locked with side rails adjusted as appropriate  - Keep care items and personal belongings within reach  - Initiate and maintain comfort rounds  - Make Fall Risk Sign visible to staff  - Offer Toileting every   Hours, in advance of need  - Initiate/Maintain   alarm  - Obtain necessary fall risk management equipment:   - Apply yellow socks and bracelet for high fall risk patients  - Consider moving patient to room near nurses station  Outcome: Progressing     Problem: MOBILITY - ADULT  Goal: Maintain or return to baseline ADL function  Description: INTERVENTIONS:  -  Assess patient's ability to carry out ADLs; assess patient's baseline for ADL function and identify physical deficits which impact ability to perform ADLs (bathing, care of mouth/teeth, toileting, grooming, dressing, etc )  - Assess/evaluate cause of self-care deficits   - Assess range of motion  - Assess patient's mobility; develop plan if impaired  - Assess patient's need for assistive devices and provide as appropriate  - Encourage maximum independence but intervene and supervise when necessary  - Involve family in performance of ADLs  - Assess for home care needs following discharge   - Consider OT consult to assist with ADL evaluation and planning for discharge  - Provide patient education as appropriate  Outcome: Progressing  Goal: Maintains/Returns to pre admission functional level  Description: INTERVENTIONS:  - Perform BMAT or MOVE assessment daily    - Set and communicate daily mobility goal to care team and patient/family/caregiver     - Collaborate with rehabilitation services on mobility goals if consulted  - Perform Range of Motion   times a day  - Reposition patient every   hours  - Dangle patient   times a day  - Stand patient   times a day  - Ambulate patient   times a day  - Out of bed to chair   times a day   - Out of bed for meals   times a day  - Out of bed for toileting  - Record patient progress and toleration of activity level   Outcome: Progressing     Problem: Nutrition/Hydration-ADULT  Goal: Nutrient/Hydration intake appropriate for improving, restoring or maintaining nutritional needs  Description: Monitor and assess patient's nutrition/hydration status for malnutrition  Collaborate with interdisciplinary team and initiate plan and interventions as ordered  Monitor patient's weight and dietary intake as ordered or per policy  Utilize nutrition screening tool and intervene as necessary  Determine patient's food preferences and provide high-protein, high-caloric foods as appropriate       INTERVENTIONS:  - Monitor oral intake, urinary output, labs, and treatment plans  - Assess nutrition and hydration status and recommend course of action  - Evaluate amount of meals eaten  - Assist patient with eating if necessary   - Allow adequate time for meals  - Recommend/ encourage appropriate diets, oral nutritional supplements, and vitamin/mineral supplements  - Order, calculate, and assess calorie counts as needed  - Recommend, monitor, and adjust tube feedings and TPN/PPN based on assessed needs  - Assess need for intravenous fluids  - Provide specific nutrition/hydration education as appropriate  - Include patient/family/caregiver in decisions related to nutrition  Outcome: Progressing     Problem: CARDIOVASCULAR - ADULT  Goal: Maintains optimal cardiac output and hemodynamic stability  Description: INTERVENTIONS:  - Monitor I/O, vital signs and rhythm  - Monitor for S/S and trends of decreased cardiac output  - Administer and titrate ordered vasoactive medications to optimize hemodynamic stability  - Assess quality of pulses, skin color and temperature  - Assess for signs of decreased coronary artery perfusion  - Instruct patient to report change in severity of symptoms  Outcome: Progressing  Goal: Absence of cardiac dysrhythmias or at baseline rhythm  Description: INTERVENTIONS:  - Continuous cardiac monitoring, vital signs, obtain 12 lead EKG if ordered  - Administer antiarrhythmic and heart rate control medications as ordered  - Monitor electrolytes and administer replacement therapy as ordered  Outcome: Progressing     Problem: RESPIRATORY - ADULT  Goal: Achieves optimal ventilation and oxygenation  Description: INTERVENTIONS:  - Assess for changes in respiratory status  - Assess for changes in mentation and behavior  - Position to facilitate oxygenation and minimize respiratory effort  - Oxygen administered by appropriate delivery if ordered  - Initiate smoking cessation education as indicated  - Encourage broncho-pulmonary hygiene including cough, deep breathe, Incentive Spirometry  - Assess the need for suctioning and aspirate as needed  - Assess and instruct to report SOB or any respiratory difficulty  - Respiratory Therapy support as indicated  Outcome: Progressing

## 2022-06-02 NOTE — PLAN OF CARE
Problem: RESPIRATORY - ADULT  Goal: Achieves optimal ventilation and oxygenation  Description: INTERVENTIONS:  - Assess for changes in respiratory status SOB, confusion, ANTON  - Assess for changes in mentation and behavior  - Position to facilitate oxygenation and minimize respiratory effort  - Oxygen administered by appropriate delivery if ordered  - Initiate smoking cessation education as indicated  - Encourage broncho-pulmonary hygiene including cough, deep breathe, Incentive Spirometry  - Assess the need for suctioning and aspirate as needed  - Assess and instruct to report SOB or any respiratory difficulty  - Respiratory Therapy support as indicated  Outcome: Progressing

## 2022-06-02 NOTE — CASE MANAGEMENT
Case Management Assessment & Discharge Planning Note    Patient name Maddie Ramirez  Location /-01 MRN [id number]  : 1955 Date 2022       Current Admission Date: 2022  Current Admission Diagnosis:Acute on chronic respiratory failure [facility])   Patient Active Problem List    Diagnosis Date Noted    Acute on chronic respiratory failure ([facility] ) 2022    Acute diastolic (congestive) heart failure ([facility] ) 2022    Morbid obesity with BMI of 40 0-44 9, adult ([facility] ) 2022    COPD, severe ([place] [id number] )     Essential hypertension     Tobacco abuse     Sepsis ([place] [id number] )       LOS (days): 2  Geometric Mean LOS (GMLOS) (days): 4 80  Days to GMLOS:2 2     OBJECTIVE:    Risk of Unplanned Readmission Score: 10 9         Current admission status: Inpatient       Preferred Pharmacy:   [address], [address] [address] [facility] [address]  Phone: [phone] Fax: [phone]    Primary Care Provider: Brooks Palacios DO    Primary Insurance: MEDICARE  Secondary Insurance: AARP    ASSESSMENT:  [address] Proxies    There are no active Health Care Proxies on file                   Readmission Root Cause  30 Day Readmission: No    Patient Information  Admitted from[de-identified] Home  Mental Status: Alert  During Assessment patient was accompanied by: Spouse  Assessment information provided by[de-identified] Patient, Spouse  Primary Caregiver: Self  Support Systems: Spouse/significant other  [place] of Residence: [facility]  do you live in?: [place]  Type of Current Residence: [facility]  In the last 12 months, was there a time when you were not able to pay the mortgage or rent on time?: No  In the last 12 months, how many places have you lived?: 1  In the last 12 months, was there a time when you did not have a steady place to sleep or slept in a shelter (including now)?: No  Homeless/housing insecurity resource given?: N/A  Living Arrangements: Lives w/ Spouse/significant other    Activities of Daily Living Prior to Admission  Functional Status: Independent  Completes ADLs independently?: Yes  Ambulates independently?: Yes  Does patient use assisted devices?: No  Does patient currently own DME?: Yes  What DME does the patient currently own?: Lisa Sunilkim  Does patient have a history of Outpatient Therapy (PT/OT)?: No  Does the patient have a history of Short-Term Rehab?: No  Does patient have a history of HHC?: No  Does patient currently have [facility]?: No         Patient Information Continued  Income Source: SSI/SSD  Within the past 12 months, you worried that your food would run out before you got the money to buy more : Never true  Within the past 12 months, the food you bought just didn't last and you didn't have money to get more : Never true  Food insecurity resource given?: N/A  Does patient receive dialysis treatments?: No  Does patient have a history of substance abuse?: No  Does patient have a history of Mental Health Diagnosis?: No         Means of Transportation  Means of Transport to Appts[de-identified] Drives Self  In the past 12 months, has lack of transportation kept you from medical appointments or from getting medications?: No  In the past 12 months, has lack of transportation kept you from meetings, work, or from getting things needed for daily living?: No  Was application for public transport provided?: N/A        DISCHARGE DETAILS:    Discharge planning discussed with[de-identified] patient and spouse  Freedom of Choice: Yes     CM contacted family/caregiver?: Yes             Contacts  Patient Contacts: Rajat Grullon, spouse  Relationship to Patient[de-identified] Family  Contact Method:  In Person  Reason/Outcome: Continuity of Care, Discharge [address]         Is the patient interested in [facility] at discharge?: No    DME Referral Provided  Referral made for DME?: No              Treatment Team Recommendation: Home  Discharge Destination Plan[de-identified] Home  Transport at Discharge : Family                 CM met with patient at bedside, baseline information was obtained  CM discussed the role of CM in helping the patient develop a discharge plan and assist the patient in carry out their plan  Patient indicated he lives at home with his spouse on one floor home  Patient independent at baseline  Patient on 3L O2 from [place] provider, at home  CM discussed VN referral with patient and patient refused, indicating he had a nurse who came to his home once a month for blood draw and patient feels he became ill on two separate occasions from the nurse  Goal of patient upon discharge is to go home

## 2022-06-02 NOTE — PROGRESS NOTES
[address]  Progress Note Giselle Severe Dinklocker 1955, 78 y o  male MRN: [id number]  Unit/Bed#: -01 Encounter: [id number]  Primary Care Provider: Moises Beard DO   Date and time admitted to hospital: 5/30/2022 11:11 PM    * Acute on chronic respiratory failure ([address] )  Assessment & Plan  · Presents with increased SOB and increased O2 requirements over the last several days  Wears 2-3L NC at baseline, was wearing up to 11L at home with O2 sats in the 70's at home per patient  Recently treated with Doxycycline and Zithromax for suspected URI as outpatient  · Chest X-ray shows pleural thickening  · Etiology is community-acquired pneumonia and severe emphysema underlying obstructive sleep apnea untreated  · Continue Rocephin and Azithromycin to cover for CAP given leukocytosis and elevated procalcitonin   · sputum culture no growth, negative Legionella and pneumococcal antigen  · PE study is negative for pulmonary embolism and shows severe emphysema and consolidation in the left lower lobe and right middle lobe consistent with pneumonia  · Wean O2 to keep SpO2 > 90%   Ambulatory oxygen requirement study shows 2 L of oxygen at rest and 6 L with activity  Pulmonary eval today  Anticipate discharge home tomorrow  · Counseled extensively on smoking cessation      Acute diastolic (congestive) heart failure [facility])  Assessment & Plan  Wt Readings from Last 3 Encounters:   06/02/22 132 kg (290 lb)     · Last echo April 2021 shows EF 60% with G1DD  2d echo this admission shows normal EF and mild aortic stenosis  · Bilateral lower extremity edema present which is now clinically improving  · Home regimen is Maxzide, start Lasix 40mg iv  BID    Appears to be improving and hence DC IV Lasix and placed on Lasix 40 mg oral daily  · Per chart review, patient has refused Lasix as outpatient in the past    · Daily weights, I&O's        Morbid obesity with BMI of 40 0-44 9, adult [facility])  Assessment & Plan  BMI is 41 42 given counseling on diet exercise and lifestyle modification    Sepsis ([facility] )  Assessment & Plan  · Present on admission as evidenced by tachycardia, tachypnea, leukocytosis WBC 22  · Afebrile, lactate normal   · COVID/flu/RSV negative  · Procalcitonin 0 67, 2 37  · In ED, received Iv cepfepime and vanco x 1  · In ed received 250cc bolus--patient with evidence of CHF and BMI 42   · F/U blood cultures neg, continue Rocephin and Zithromax   · Suspected source underlying pneumonia  Consult placed to pulmonology       Tobacco abuse  Assessment & Plan  · Reports he quit smoking a couple of days prior to admission, previously 2 ppd   · Nicotine patch ordered, patient request   Karissa garcia on smoking cessation     Essential hypertension  Assessment & Plan  · Blood pressure controlled  Continue Losartan   · Hold Maxzide, continue Lasix     COPD, severe (HCC)  Assessment & Plan    · No wheezing present on exam, does not appear to be in acute exacerbation  · Of note, patient refusing all steroids due to negative reaction (agitation) in the past   · Continue Flovent and PRN albuterol nebs   · Continue Zithromax MWF as maintenance therapy   · Dr Tanna Swann is his outpatient pulmonologist  · Pulmonary toilet, flutter valve TID       VTE Pharmacologic Prophylaxis:   Pharmacologic: Heparin  Mechanical VTE Prophylaxis in Place: Yes    Patient Centered Rounds: I have performed bedside rounds with nursing staff today  Discussions with Specialists or Other Care Team Provider:  Will discuss with pulmonology    Education and Discussions with Family / Patient:  Discussed with patient at bedside    Time Spent for Care: 45 minutes  More than 50% of total time spent on counseling and coordination of care as described above      Current Length of Stay: 2 day(s)    Current Patient Status: Inpatient   Certification Statement: The patient will continue to require additional inpatient hospital stay due to Multilobar pneumonia    Discharge Plan:  Anticipate discharge home tomorrow    Code Status: Level 1 - Full Code      Subjective:   Patient had a long list of questions which I answered to the best of my ability related to his pneumonia his cough and phlegm issues  States he is feeling a little bit better today  Objective:     Vitals:   Temp (24hrs), Av 3 °F (36 8 °C), Min:98 3 °F (36 8 °C), Max:98 3 °F (36 8 °C)    Temp:  [98 3 °F (36 8 °C)] 98 3 °F (36 8 °C)  HR:  [96-99] 96  Resp:  [19] 19  BP: (131)/(70) 131/70  SpO2:  [93 %] 93 %  Body mass index is 40 45 kg/m²  Input and Output Summary (last 24 hours): Intake/Output Summary (Last 24 hours) at 2022 1241  Last data filed at 2022 1023  Gross per 24 hour   Intake 1560 ml   Output 2150 ml   Net -590 ml       Physical Exam:     Physical Exam  Vitals and nursing note reviewed  Constitutional:       Appearance: Normal appearance  He is obese  HENT:      Head: Normocephalic and atraumatic  Right Ear: External ear normal       Left Ear: External ear normal       Nose: Nose normal       Mouth/Throat:      Pharynx: Oropharynx is clear  Eyes:      Pupils: Pupils are equal, round, and reactive to light  Cardiovascular:      Rate and Rhythm: Normal rate and regular rhythm  Heart sounds: Normal heart sounds  Pulmonary:      Effort: Pulmonary effort is normal       Comments: Moderate air entry bilaterally with diminished breath sounds bilateral bases  Abdominal:      General: Bowel sounds are normal       Palpations: Abdomen is soft  Tenderness: There is no abdominal tenderness  Musculoskeletal:         General: Normal range of motion  Cervical back: Normal range of motion and neck supple  Right lower leg: Edema present  Left lower leg: Edema present  Comments: Trace to 1+ edema bilateral lower extremity   Skin:     General: Skin is warm and dry        Capillary Refill: Capillary refill takes less than 2 seconds  Neurological:      General: No focal deficit present  Mental Status: He is alert and oriented to person, place, and time  Psychiatric:         Mood and Affect: Mood normal            Additional Data:     Labs:    Results from last 7 days   Lab Units 06/02/22  0444 05/31/22  0518   WBC Thousand/uL 12 08* 37 32*   HEMOGLOBIN g/dL 13 9 14 6   HEMATOCRIT % 43 7 45 2   PLATELETS Thousands/uL 285 280   NEUTROS PCT %  --  89*   LYMPHS PCT %  --  4*   MONOS PCT %  --  6   EOS PCT %  --  0     Results from last 7 days   Lab Units 06/02/22  0444 06/01/22  0511 05/30/22  2329   SODIUM mmol/L 138   < > 135*   POTASSIUM mmol/L 3 5   < > 3 5   CHLORIDE mmol/L 97*   < > 94*   CO2 mmol/L 36*   < > 32   BUN mg/dL 13   < > 11   CREATININE mg/dL 0 59*   < > 0 63   ANION GAP mmol/L 5   < > 9   CALCIUM mg/dL 8 8   < > 9 3   ALBUMIN g/dL  --   --  3 5   TOTAL BILIRUBIN mg/dL  --   --  0 69   ALK PHOS U/L  --   --  79   ALT U/L  --   --  54   AST U/L  --   --  42   GLUCOSE RANDOM mg/dL 100   < > 108    < > = values in this interval not displayed  Results from last 7 days   Lab Units 05/30/22  2329   INR  1 01     Results from last 7 days   Lab Units 05/31/22  0150   POC GLUCOSE mg/dl 122         Results from last 7 days   Lab Units 05/31/22  0518 05/30/22  2329   LACTIC ACID mmol/L  --  2 0   PROCALCITONIN ng/ml 2 37* 0 67*           * I Have Reviewed All Lab Data Listed Above  * Additional Pertinent Lab Tests Reviewed: Twila 66 Admission Reviewed    Imaging:    Imaging Reports Reviewed Today Include:  CT chest  Imaging Personally Reviewed by Myself Includes:  CT chest    Recent Cultures (last 7 days):     Results from last 7 days   Lab Units 05/31/22  1615 05/31/22  0312 05/30/22  2339 05/30/22  2329   BLOOD CULTURE   --   --  No Growth at 48 hrs  No Growth at 48 hrs     SPUTUM CULTURE  2+ Growth of   --   --   --    GRAM STAIN RESULT  4+ Polys  No organisms seen  --   --   -- LEGIONELLA URINARY ANTIGEN   --  Negative  --   --        Last 24 Hours Medication List:   Current Facility-Administered Medications   Medication Dose Route Frequency Provider Last Rate    albuterol  2 5 mg Nebulization Q4H PRN Luwanna Games, CRNP      aluminum-magnesium hydroxide-simethicone  30 mL Oral Q4H PRN Luwanna Games, CRNP      aspirin  81 mg Oral Daily Luwanna Games, CRNP      azithromycin  250 mg Oral Q24H Christy Brown MD      cefTRIAXone  2,000 mg Intravenous Q24H Christy Brown MD 2,000 mg (06/01/22 1414)    cyanocobalamin  500 mcg Oral Daily Luwanna Games, CRNP      fluticasone  2 puff Inhalation BID Luwanna Games, CRNP      furosemide  40 mg Oral Daily Christy Brown MD      guaiFENesin  600 mg Oral BID Luwanna Games, CRNP      heparin (porcine)  5,000 Units Subcutaneous [facility] Luwanna Games, [address]      ipratropium-albuterol  3 mL Nebulization Q6H Christy Brown MD      lidocaine  1 patch Topical Daily Luwanna Games, CRNP      losartan  12 5 mg Oral Daily Luwanna Games, CRNP      morphine injection  2 mg Intravenous Q4H PRN Christy Brown MD      multivitamin-minerals  1 tablet Oral Daily Luwanna Games, CRNP      nicotine  21 mg Transdermal Daily Luwanna Games, CRNP      ondansetron  4 mg Intravenous Q6H PRN Luwanna Games, CRNP      polyethylene glycol  17 g Oral Daily PRN Christy Brown MD      senna-docusate sodium  1 tablet Oral Daily PRN Christy Brown MD          Today, Patient Was Seen By: Christy Brown MD    ** Please Note: Dictation voice to text software may have been used in the creation of this document   **

## 2022-06-02 NOTE — NURSING NOTE
Patient agitated at this time vital signs not obtained, uncooperative with respiratory in obtaining  home o2 eval will attempt vital signs at a later time when patient has calmed down

## 2022-06-02 NOTE — ASSESSMENT & PLAN NOTE
· No wheezing present on exam, does not appear to be in acute exacerbation  · Of note, patient refusing all steroids due to negative reaction (agitation) in the past   · Continue Flovent and PRN albuterol nebs   · Continue Zithromax MWF as maintenance therapy   · Dr Deborah Sánchez is his outpatient pulmonologist  · Pulmonary toilet, flutter valve TID

## 2022-06-02 NOTE — ASSESSMENT & PLAN NOTE
· Presents with increased SOB and increased O2 requirements over the last several days  Wears 2-3L NC at baseline, was wearing up to 11L at home with O2 sats in the 70's at home per patient  Recently treated with Doxycycline and Zithromax for suspected URI as outpatient  · Chest X-ray shows pleural thickening  · Etiology is community-acquired pneumonia and severe emphysema underlying obstructive sleep apnea untreated  · Continue Rocephin and Azithromycin to cover for CAP given leukocytosis and elevated procalcitonin   · sputum culture no growth, negative Legionella and pneumococcal antigen  · PE study is negative for pulmonary embolism and shows severe emphysema and consolidation in the left lower lobe and right middle lobe consistent with pneumonia  · Wean O2 to keep SpO2 > 90%   Ambulatory oxygen requirement study shows 2 L of oxygen at rest and 6 L with activity  Pulmonary eval today    Anticipate discharge home tomorrow  · Counseled extensively on smoking cessation

## 2022-06-03 VITALS
BODY MASS INDEX: 41.05 KG/M2 | SYSTOLIC BLOOD PRESSURE: 109 MMHG | OXYGEN SATURATION: 94 % | HEART RATE: 81 BPM | TEMPERATURE: 97.5 F | DIASTOLIC BLOOD PRESSURE: 56 MMHG | WEIGHT: 293.2 LBS | RESPIRATION RATE: 18 BRPM | HEIGHT: 71 IN

## 2022-06-03 PROCEDURE — 99239 HOSP IP/OBS DSCHRG MGMT >30: CPT | Performed by: FAMILY MEDICINE

## 2022-06-03 PROCEDURE — 94640 AIRWAY INHALATION TREATMENT: CPT

## 2022-06-03 RX ORDER — LEVOFLOXACIN 750 MG/1
750 TABLET ORAL EVERY 24 HOURS
Qty: 8 TABLET | Refills: 0 | Status: SHIPPED | OUTPATIENT
Start: 2022-06-03 | End: 2022-06-03

## 2022-06-03 RX ORDER — FUROSEMIDE 40 MG/1
60 TABLET ORAL DAILY
Qty: 45 TABLET | Refills: 0 | Status: SHIPPED | OUTPATIENT
Start: 2022-06-04 | End: 2022-08-01

## 2022-06-03 RX ORDER — FUROSEMIDE 40 MG/1
40 TABLET ORAL DAILY
Qty: 30 TABLET | Refills: 0 | Status: SHIPPED | OUTPATIENT
Start: 2022-06-04 | End: 2022-06-03 | Stop reason: SDUPTHER

## 2022-06-03 RX ORDER — GUAIFENESIN/DEXTROMETHORPHAN 100-10MG/5
10 SYRUP ORAL EVERY 4 HOURS PRN
Qty: 237 ML | Refills: 0 | Status: SHIPPED | OUTPATIENT
Start: 2022-06-03 | End: 2022-06-13

## 2022-06-03 RX ORDER — IBUPROFEN 400 MG/1
400 TABLET ORAL 3 TIMES DAILY
Qty: 15 TABLET | Refills: 0 | Status: SHIPPED | OUTPATIENT
Start: 2022-06-03 | End: 2022-08-01

## 2022-06-03 RX ORDER — ACETAMINOPHEN 325 MG/1
650 TABLET ORAL EVERY 6 HOURS PRN
Qty: 20 TABLET | Refills: 0 | Status: SHIPPED | OUTPATIENT
Start: 2022-06-03 | End: 2022-06-13

## 2022-06-03 RX ORDER — KETOROLAC TROMETHAMINE 30 MG/ML
15 INJECTION, SOLUTION INTRAMUSCULAR; INTRAVENOUS ONCE
Status: COMPLETED | OUTPATIENT
Start: 2022-06-03 | End: 2022-06-03

## 2022-06-03 RX ORDER — IBUPROFEN 600 MG/1
600 TABLET ORAL 3 TIMES DAILY
Status: DISCONTINUED | OUTPATIENT
Start: 2022-06-03 | End: 2022-06-03 | Stop reason: HOSPADM

## 2022-06-03 RX ORDER — GUAIFENESIN 600 MG
600 TABLET, EXTENDED RELEASE 12 HR ORAL EVERY 12 HOURS SCHEDULED
Status: DISCONTINUED | OUTPATIENT
Start: 2022-06-03 | End: 2022-06-03 | Stop reason: HOSPADM

## 2022-06-03 RX ORDER — ALBUTEROL SULFATE 90 UG/1
2 AEROSOL, METERED RESPIRATORY (INHALATION) 4 TIMES DAILY
Qty: 18 G | Refills: 0 | Status: SHIPPED | OUTPATIENT
Start: 2022-06-03

## 2022-06-03 RX ORDER — IPRATROPIUM BROMIDE AND ALBUTEROL SULFATE 2.5; .5 MG/3ML; MG/3ML
3 SOLUTION RESPIRATORY (INHALATION)
Qty: 360 ML | Refills: 0 | Status: SHIPPED | OUTPATIENT
Start: 2022-06-03 | End: 2022-06-03

## 2022-06-03 RX ORDER — NICOTINE 21 MG/24HR
1 PATCH, TRANSDERMAL 24 HOURS TRANSDERMAL DAILY
Qty: 28 PATCH | Refills: 0 | Status: SHIPPED | OUTPATIENT
Start: 2022-06-04

## 2022-06-03 RX ORDER — ACETAMINOPHEN 325 MG/1
650 TABLET ORAL EVERY 6 HOURS PRN
Status: DISCONTINUED | OUTPATIENT
Start: 2022-06-03 | End: 2022-06-03 | Stop reason: HOSPADM

## 2022-06-03 RX ORDER — CEFDINIR 300 MG/1
300 CAPSULE ORAL EVERY 12 HOURS SCHEDULED
Qty: 16 CAPSULE | Refills: 0 | Status: SHIPPED | OUTPATIENT
Start: 2022-06-03 | End: 2022-06-11

## 2022-06-03 RX ORDER — ALBUTEROL SULFATE 2.5 MG/3ML
2.5 SOLUTION RESPIRATORY (INHALATION)
Qty: 270 ML | Refills: 0 | Status: SHIPPED | OUTPATIENT
Start: 2022-06-03 | End: 2022-07-03

## 2022-06-03 RX ORDER — ALBUTEROL SULFATE 2.5 MG/3ML
2.5 SOLUTION RESPIRATORY (INHALATION)
Status: DISCONTINUED | OUTPATIENT
Start: 2022-06-03 | End: 2022-06-03 | Stop reason: HOSPADM

## 2022-06-03 RX ORDER — FUROSEMIDE 40 MG/1
40 TABLET ORAL
Status: DISCONTINUED | OUTPATIENT
Start: 2022-06-03 | End: 2022-06-03 | Stop reason: HOSPADM

## 2022-06-03 RX ADMIN — NICOTINE 21 MG: 21 PATCH, EXTENDED RELEASE TRANSDERMAL at 09:18

## 2022-06-03 RX ADMIN — IBUPROFEN 600 MG: 600 TABLET ORAL at 10:52

## 2022-06-03 RX ADMIN — MORPHINE SULFATE 2 MG: 2 INJECTION, SOLUTION INTRAMUSCULAR; INTRAVENOUS at 04:00

## 2022-06-03 RX ADMIN — KETOROLAC TROMETHAMINE 15 MG: 30 INJECTION, SOLUTION INTRAMUSCULAR at 10:52

## 2022-06-03 RX ADMIN — CYANOCOBALAMIN TAB 500 MCG 500 MCG: 500 TAB at 09:17

## 2022-06-03 RX ADMIN — AZITHROMYCIN 250 MG: 250 TABLET, FILM COATED ORAL at 14:35

## 2022-06-03 RX ADMIN — Medication 1 TABLET: at 09:17

## 2022-06-03 RX ADMIN — FUROSEMIDE 40 MG: 40 TABLET ORAL at 09:17

## 2022-06-03 RX ADMIN — LOSARTAN POTASSIUM 12.5 MG: 25 TABLET, FILM COATED ORAL at 09:17

## 2022-06-03 RX ADMIN — LIDOCAINE 5% 1 PATCH: 700 PATCH TOPICAL at 09:18

## 2022-06-03 RX ADMIN — CEFTRIAXONE 2000 MG: 2 INJECTION, SOLUTION INTRAVENOUS at 14:33

## 2022-06-03 RX ADMIN — ASPIRIN 81 MG CHEWABLE TABLET 81 MG: 81 TABLET CHEWABLE at 09:17

## 2022-06-03 RX ADMIN — IPRATROPIUM BROMIDE AND ALBUTEROL SULFATE 3 ML: 2.5; .5 SOLUTION RESPIRATORY (INHALATION) at 02:16

## 2022-06-03 NOTE — NURSING NOTE
Pt c/o right sided chest pain during assessment  Pt stated "it feels like someone is driving railroad stakes through my chest"  O2 sat 94%  Provider notified, EKG ordered  Pt refused EKG stating that he does not want an EKG, it is lung pain  Provider notified of same  Will continue to monitor

## 2022-06-03 NOTE — ASSESSMENT & PLAN NOTE
· No wheezing present on exam, does not appear to be in acute exacerbation  · Of note, patient refusing all steroids due to negative reaction (agitation) in the past   · Continue Flovent and PRN albuterol nebs   · Will put on a 10 day course of Levaquin upon discharge    Place on DuoNebs upon discharge  · Dr Marisol Esquivel is his outpatient pulmonologist  · Pulmonary toilet, flutter valve TID

## 2022-06-03 NOTE — PLAN OF CARE
Problem: Potential for Falls  Goal: Patient will remain free of falls  Description: INTERVENTIONS:  - Educate patient/family on patient safety including physical limitations  - Instruct patient to call for assistance with activity   - Consult OT/PT to assist with strengthening/mobility   - Keep Call bell within reach  - Keep bed low and locked with side rails adjusted as appropriate  - Keep care items and personal belongings within reach  - Initiate and maintain comfort rounds  - Make Fall Risk Sign visible to staff  - Apply yellow socks and bracelet for high fall risk patients  - Consider moving patient to room near nurses station  6/3/2022 1725 by Geovanni Quezada RN  Outcome: Progressing  6/3/2022 1130 by Geovanni Quezada RN  Outcome: Progressing

## 2022-06-03 NOTE — ASSESSMENT & PLAN NOTE
· Presents with increased SOB and increased O2 requirements over the last several days  Wears 2-3L NC at baseline, was wearing up to 11L at home with O2 sats in the 70's at home per patient  Recently treated with Doxycycline and Zithromax for suspected URI as outpatient  · Chest X-ray shows pleural thickening  · Etiology is community-acquired pneumonia and severe emphysema underlying obstructive sleep apnea untreated  · Continue Rocephin and Azithromycin to cover for CAP given leukocytosis and elevated procalcitonin   · sputum culture no growth, negative Legionella and pneumococcal antigen  · PE study is negative for pulmonary embolism and shows severe emphysema and consolidation in the left lower lobe and right middle lobe consistent with pneumonia  · Wean O2 to keep SpO2 > 90%   Ambulatory oxygen requirement study shows 2 L of oxygen at rest and 6 L with activity  Pulmonary eval today    Anticipate discharge home tomorrow  · Counseled extensively on smoking cessation  · Discharged on 10 day course of Levaquin with outpatient pulmonary follow-up and repeat chest x-ray in 4 weeks

## 2022-06-03 NOTE — PLAN OF CARE
Problem: Potential for Falls  Goal: Patient will remain free of falls  Description: INTERVENTIONS:  - Educate patient/family on patient safety including physical limitations  - Instruct patient to call for assistance with activity   - Consult OT/PT to assist with strengthening/mobility   - Keep Call bell within reach  - Keep bed low and locked with side rails adjusted as appropriate  - Keep care items and personal belongings within reach  - Initiate and maintain comfort rounds  - Make Fall Risk Sign visible to staff  - Offer Toileting every   Hours, in advance of need  - Initiate/Maintain   alarm  - Obtain necessary fall risk management equipment:   - Apply yellow socks and bracelet for high fall risk patients  - Consider moving patient to room near nurses station  Outcome: Progressing     Problem: MOBILITY - ADULT  Goal: Maintain or return to baseline ADL function  Description: INTERVENTIONS:  -  Assess patient's ability to carry out ADLs; assess patient's baseline for ADL function and identify physical deficits which impact ability to perform ADLs (bathing, care of mouth/teeth, toileting, grooming, dressing, etc )  - Assess/evaluate cause of self-care deficits   - Assess range of motion  - Assess patient's mobility; develop plan if impaired  - Assess patient's need for assistive devices and provide as appropriate  - Encourage maximum independence but intervene and supervise when necessary  - Involve family in performance of ADLs  - Assess for home care needs following discharge   - Consider OT consult to assist with ADL evaluation and planning for discharge  - Provide patient education as appropriate  Outcome: Progressing  Goal: Maintains/Returns to pre admission functional level  Description: INTERVENTIONS:  - Perform BMAT or MOVE assessment daily    - Set and communicate daily mobility goal to care team and patient/family/caregiver     - Collaborate with rehabilitation services on mobility goals if consulted  - Perform Range of Motion   times a day  - Reposition patient every   hours  - Dangle patient   times a day  - Stand patient   times a day  - Ambulate patient   times a day  - Out of bed to chair   times a day   - Out of bed for meals   times a day  - Out of bed for toileting  - Record patient progress and toleration of activity level   Outcome: Progressing     Problem: Nutrition/Hydration-ADULT  Goal: Nutrient/Hydration intake appropriate for improving, restoring or maintaining nutritional needs  Description: Monitor and assess patient's nutrition/hydration status for malnutrition  Collaborate with interdisciplinary team and initiate plan and interventions as ordered  Monitor patient's weight and dietary intake as ordered or per policy  Utilize nutrition screening tool and intervene as necessary  Determine patient's food preferences and provide high-protein, high-caloric foods as appropriate       INTERVENTIONS:  - Monitor oral intake, urinary output, labs, and treatment plans  - Assess nutrition and hydration status and recommend course of action  - Evaluate amount of meals eaten  - Assist patient with eating if necessary   - Allow adequate time for meals  - Recommend/ encourage appropriate diets, oral nutritional supplements, and vitamin/mineral supplements  - Order, calculate, and assess calorie counts as needed  - Recommend, monitor, and adjust tube feedings and TPN/PPN based on assessed needs  - Assess need for intravenous fluids  - Provide specific nutrition/hydration education as appropriate  - Include patient/family/caregiver in decisions related to nutrition  Outcome: Progressing     Problem: CARDIOVASCULAR - ADULT  Goal: Maintains optimal cardiac output and hemodynamic stability  Description: INTERVENTIONS:  - Monitor I/O, vital signs and rhythm  - Monitor for S/S and trends of decreased cardiac output  - Administer and titrate ordered vasoactive medications to optimize hemodynamic stability  - Assess quality of pulses, skin color and temperature  - Assess for signs of decreased coronary artery perfusion  - Instruct patient to report change in severity of symptoms  Outcome: Progressing  Goal: Absence of cardiac dysrhythmias or at baseline rhythm  Description: INTERVENTIONS:  - Continuous cardiac monitoring, vital signs, obtain 12 lead EKG if ordered  - Administer antiarrhythmic and heart rate control medications as ordered  - Monitor electrolytes and administer replacement therapy as ordered  Outcome: Progressing     Problem: RESPIRATORY - ADULT  Goal: Achieves optimal ventilation and oxygenation  Description: INTERVENTIONS:  - Assess for changes in respiratory status SOB, confusion, ANTON  - Assess for changes in mentation and behavior  - Position to facilitate oxygenation and minimize respiratory effort  - Oxygen administered by appropriate delivery if ordered  - Initiate smoking cessation education as indicated  - Encourage broncho-pulmonary hygiene including cough, deep breathe, Incentive Spirometry  - Assess the need for suctioning and aspirate as needed  - Assess and instruct to report SOB or any respiratory difficulty  - Respiratory Therapy support as indicated  Outcome: Progressing

## 2022-06-03 NOTE — ASSESSMENT & PLAN NOTE
Wt Readings from Last 3 Encounters:   06/03/22 133 kg (293 lb 3 2 oz)     · Last echo April 2021 shows EF 60% with G1DD  2d echo this admission shows normal EF and mild aortic stenosis  · Bilateral lower extremity edema present which is now clinically improving  · Home regimen is Maxzide, start Lasix 40mg iv  BID    Appears to be improving and hence DC IV Lasix and placed on Lasix 60 mg oral daily  · Per chart review, patient has refused Lasix as outpatient in the past    · Daily weights, I&O's

## 2022-06-03 NOTE — NURSING NOTE
Discharge instructions and medications reviewed with patient  IV d/c'd  No written scripts  All questions answered

## 2022-06-03 NOTE — ASSESSMENT & PLAN NOTE
· Present on admission as evidenced by tachycardia, tachypnea, leukocytosis WBC 22  · Afebrile, lactate normal   · COVID/flu/RSV negative  · Procalcitonin 0 67, 2 37  · In ED, received Iv cepfepime and vanco x 1  · In ed received 250cc bolus--patient with evidence of CHF and BMI 42   · F/U blood cultures neg, continue Rocephin and Zithromax   · Suspected source underlying pneumonia    Appreciate pulmonology consult

## 2022-06-03 NOTE — ASSESSMENT & PLAN NOTE
· Reports he quit smoking a couple of days prior to admission, previously 2 ppd   · Nicotine patch ordered, patient request   Melecio Persons extensively on smoking cessation

## 2022-06-03 NOTE — DISCHARGE SUMMARY
[address]  Discharge- Tam Ramirez 1955, 78 y o  male MRN: [id number]  Unit/Bed#: -01 Encounter: [id number]  Primary Care Provider: Alana , DO   Date and time admitted to hospital: 5/30/2022 11:11 PM    * Acute on chronic respiratory failure ([address] )  Assessment & Plan  · Presents with increased SOB and increased O2 requirements over the last several days  Wears 2-3L NC at baseline, was wearing up to 11L at home with O2 sats in the 70's at home per patient  Recently treated with Doxycycline and Zithromax for suspected URI as outpatient  · Chest X-ray shows pleural thickening  · Etiology is community-acquired pneumonia and severe emphysema underlying obstructive sleep apnea untreated  · Continue Rocephin and Azithromycin to cover for CAP given leukocytosis and elevated procalcitonin   · sputum culture no growth, negative Legionella and pneumococcal antigen  · PE study is negative for pulmonary embolism and shows severe emphysema and consolidation in the left lower lobe and right middle lobe consistent with pneumonia  · Wean O2 to keep SpO2 > 90%   Ambulatory oxygen requirement study shows 2 L of oxygen at rest and 6 L with activity  Pulmonary eval today  Anticipate discharge home tomorrow  · Counseled extensively on smoking cessation  · Discharged on 8 days of omnicef with outpatient pulmonary follow-up and repeat chest x-ray in 4 weeks      Acute diastolic (congestive) heart failure (HCC)  Assessment & Plan  Wt Readings from Last 3 Encounters:   06/03/22 133 kg (293 lb 3 2 oz)     · Last echo April 2021 shows EF 60% with G1DD  2d echo this admission shows normal EF and mild aortic stenosis  · Bilateral lower extremity edema present which is now clinically improving  · Home regimen is Maxzide, start Lasix 40mg iv  BID    Appears to be improving and hence DC IV Lasix and placed on Lasix 60 mg oral daily  · Per chart review, patient has refused Lasix as outpatient in the past    · Daily weights, I&O's        Morbid obesity with BMI of 40 0-44 9, adult ([facility] [address] )  Assessment & Plan  BMI is 41 42 given counseling on diet exercise and lifestyle modification    Sepsis ([facility] [address] )  Assessment & Plan  · Present on admission as evidenced by tachycardia, tachypnea, leukocytosis WBC 22  · Afebrile, lactate normal   · COVID/flu/RSV negative  · Procalcitonin 0 67, 2 37  · In ED, received Iv cepfepime and vanco x 1  · In ed received 250cc bolus--patient with evidence of CHF and BMI 42   · F/U blood cultures neg, continue Rocephin and Zithromax   · Suspected source underlying pneumonia  Appreciate pulmonology consult      Tobacco abuse  Assessment & Plan  · Reports he quit smoking a couple of days prior to admission, previously 2 ppd   · Nicotine patch ordered, patient request   Bere Overlie extensively on smoking cessation     Essential hypertension  Assessment & Plan  · Blood pressure controlled  Continue Losartan   · Hold Maxzide, continue Lasix     COPD, severe (HCC)  Assessment & Plan    · No wheezing present on exam, does not appear to be in acute exacerbation  · Of note, patient refusing all steroids due to negative reaction (agitation) in the past   · Continue Flovent and PRN albuterol nebs   · Will put on a 10 day course of Levaquin upon discharge    Place on DuoNebs upon discharge  · Dr Jane Bauer is his outpatient pulmonologist  · Pulmonary toilet, flutter valve TID     Discharging Physician / Practitioner: Audrey Bolton MD  PCP: Janie Mills DO  Admission Date:   Admission Orders (From admission, onward)     Ordered        05/31/22 0118  Inpatient Admission  Once                      Discharge Date: 06/03/22    Medical Problems             Resolved Problems  Date Reviewed: 6/3/2022   None                 Consultations During Hospital Stay:  · Pulmonology    Procedures Performed:   · None    Significant Findings / Test Results:   XR chest portable    Result Date: 6/3/2022  Impression: Right basilar atelectasis/infiltrate  Workstation performed: [id number]     XR chest 1 view portable    Result Date: 5/31/2022  Impression: No acute cardiopulmonary disease  Chronic flattening of the left diaphragm may be related to pleural thickening  Workstation performed: [id number]     CTA chest pe study    Result Date: 5/31/2022  Impression: No pulmonary embolus  Severe background emphysema with superimposed consolidation in the left lower lobe and right middle lobe consistent with pneumonia  Reactive mediastinal lymph nodes  Workstation performed: [id number]     Incidental Findings:   · None     Test Results Pending at Discharge (will require follow up): · None     Outpatient Tests Requested:  · Outpatient follow-up with pulmonology recommended in 2 weeks  · Chest x-ray PA and lateral view in 4 weeks    Complications:  None    Reason for Admission:  Shortness of breath    Hospital Course:     Augsutine Ramirez is a 78 y o  male patient who originally presented to the hospital on 5/30/2022 due to acute on chronic respiratory failure with hypoxia due to COPD exacerbation and multilobar pneumonia noted  Also having some pleuritic chest pain  PE study was negative for pulmonary embolism  Also found to have some mild fluid overload and hence placed on Lasix  Now clinically improving placed on NSAIDs for short-term course for pleuritic chest pain and continue course of omnicef  Recommend outpatient follow-up with pulmonologist and also strongly counseled on smoking cessation on several occasions  Also recommended some weight loss will help improve his overall breathing mechanics      Please see above list of diagnoses and related plan for additional information  Condition at Discharge: fair     Discharge Day Visit / Exam:     Subjective:  Patient denies any nausea vomiting or abdominal pain    He has some right-sided pleuritic chest pain since yesterday evening otherwise is feeling okay  No fevers or chills noted  Vitals: Blood Pressure: 122/75 (06/03/22 0726)  Pulse: 89 (06/03/22 0726)  Temperature: 97 6 °F (36 4 °C) (06/03/22 0726)  Temp Source: Oral (05/31/22 2310)  Respirations: 18 (06/03/22 0726)  Height: 5' 11" (180 3 cm) (05/31/22 1000)  Weight - Scale: 133 kg (293 lb 3 2 oz) (06/03/22 0550)  SpO2: 96 % (06/03/22 0937)  Exam:   Physical Exam  Vitals and nursing note reviewed  Constitutional:       Appearance: Normal appearance  He is obese  HENT:      Head: Normocephalic and atraumatic  Right Ear: External ear normal       Left Ear: External ear normal       Nose: Nose normal       Mouth/Throat:      Pharynx: Oropharynx is clear  Eyes:      Pupils: Pupils are equal, round, and reactive to light  Cardiovascular:      Rate and Rhythm: Normal rate and regular rhythm  Heart sounds: Normal heart sounds  Pulmonary:      Effort: Pulmonary effort is normal       Breath sounds: Normal breath sounds  Comments: No wheezing rales or rhonchi noted  Abdominal:      General: Bowel sounds are normal       Palpations: Abdomen is soft  Tenderness: There is no abdominal tenderness  Musculoskeletal:         General: Normal range of motion  Cervical back: Normal range of motion and neck supple  Comments: Trace edema bilateral lower extremity   Skin:     General: Skin is warm and dry  Capillary Refill: Capillary refill takes less than 2 seconds  Neurological:      General: No focal deficit present  Mental Status: He is alert and oriented to person, place, and time  Psychiatric:         Mood and Affect: Mood normal          Discussion with Family:  Discussed with wife at bedside    Discharge instructions/Information to patient and family:   See after visit summary for information provided to patient and family        Provisions for Follow-Up Care:  See after visit summary for information related to follow-up care and any pertinent home health orders  Disposition:     Home    For Discharges to [facility] SNF:   · Not Applicable to this Patient - Not Applicable to this Patient    Planned Readmission:  None     Discharge Statement:  I spent 35 minutes discharging the patient  This time was spent on the day of discharge  I had direct contact with the patient on the day of discharge  Greater than 50% of the total time was spent examining patient, answering all patient questions, arranging and discussing plan of care with patient as well as directly providing post-discharge instructions  Additional time then spent on discharge activities  Discharge Medications:  See after visit summary for reconciled discharge medications provided to patient and family        ** Please Note: This note has been constructed using a voice recognition system **

## 2022-06-05 LAB
BACTERIA BLD CULT: NORMAL
BACTERIA BLD CULT: NORMAL

## 2022-06-15 ENCOUNTER — TELEPHONE (OUTPATIENT)
Dept: CARDIOLOGY CLINIC | Facility: CLINIC | Age: 67
End: 2022-06-15

## 2022-06-15 ENCOUNTER — TRANSCRIBE ORDERS (OUTPATIENT)
Dept: CARDIOLOGY CLINIC | Facility: CLINIC | Age: 67
End: 2022-06-15

## 2022-06-15 DIAGNOSIS — R35.1 NOCTURIA: Primary | ICD-10-CM

## 2022-06-20 ENCOUNTER — TELEPHONE (OUTPATIENT)
Dept: CARDIOLOGY CLINIC | Facility: CLINIC | Age: 67
End: 2022-06-20

## 2022-08-01 ENCOUNTER — OFFICE VISIT (OUTPATIENT)
Dept: UROLOGY | Facility: CLINIC | Age: 67
End: 2022-08-01
Payer: MEDICARE

## 2022-08-01 ENCOUNTER — DOCUMENTATION (OUTPATIENT)
Dept: UROLOGY | Facility: CLINIC | Age: 67
End: 2022-08-01

## 2022-08-01 VITALS
BODY MASS INDEX: 40.32 KG/M2 | DIASTOLIC BLOOD PRESSURE: 74 MMHG | HEIGHT: 71 IN | WEIGHT: 288 LBS | SYSTOLIC BLOOD PRESSURE: 122 MMHG | OXYGEN SATURATION: 95 % | HEART RATE: 78 BPM

## 2022-08-01 DIAGNOSIS — N40.1 BENIGN PROSTATIC HYPERPLASIA WITH NOCTURIA: Primary | ICD-10-CM

## 2022-08-01 DIAGNOSIS — R35.1 BENIGN PROSTATIC HYPERPLASIA WITH NOCTURIA: Primary | ICD-10-CM

## 2022-08-01 PROCEDURE — 99203 OFFICE O/P NEW LOW 30 MIN: CPT

## 2022-08-01 RX ORDER — FINASTERIDE 5 MG/1
5 TABLET, FILM COATED ORAL DAILY
Qty: 30 TABLET | Refills: 0 | Status: SHIPPED | OUTPATIENT
Start: 2022-08-01

## 2022-08-01 RX ORDER — VITAMIN B COMPLEX
1 CAPSULE ORAL DAILY
COMMUNITY

## 2022-08-01 RX ORDER — KETOCONAZOLE 20 MG/ML
SHAMPOO TOPICAL
COMMUNITY
Start: 2022-06-07

## 2022-08-01 NOTE — LETTER
Please excuse Juan Carlos Juarez from work on 8/01/2022, as she accompanied Klaus Led to his scheduled appointment on 8/01/2022           Abby HOLLAND

## 2022-08-01 NOTE — PROGRESS NOTES
8/1/2022    No chief complaint on file  Assessment and Plan    78 y o  male new patient to office    1  BPH with LUTS  · Reports urinary frequency, weak urinary stream, and nocturia  · VIVIANA reveals smooth symmetric prostate, no palpable nodules or masses  Estimated gland size 35-40 g  · Sulfa allergy history as child, unsure of reaction but believes it may have been a severe reaction  · Due to allergy history, I recommend a trial of finasteride 5 mg daily in addition he can try saw palmetto OTC  · Recommend follow-up in 3 months to reevaluate his symptoms  History of Present Illness  Ashley Ramirez is a 78 y o  male here for evaluation of urinary frequency, weak urinary stream, nocturia 3 times per night  This has been ongoing for 1-2 years  He does take furosemide daily for lower extremity edema  No prior urologic evaluation  He primarily drinks coffee about 2 cups, water 3 glasses, 6 oz of milk with dinner, and mixed fruit juice every other day  Denies family history of prostate cancer  PSA 0 87 (7/01/2022)  Review of Systems   Constitutional: Negative for chills and fever  HENT: Negative for congestion and sore throat  Respiratory: Negative for cough and shortness of breath  Chronic O2 at 3 L NC   Cardiovascular: Negative for chest pain and leg swelling  Gastrointestinal: Negative for abdominal pain, constipation, diarrhea, nausea and vomiting  Genitourinary: Positive for decreased urine volume and frequency  Negative for difficulty urinating, dysuria, flank pain, hematuria and urgency  Nocturia   Musculoskeletal: Negative for back pain and gait problem  Skin: Negative for wound  Allergic/Immunologic: Negative for immunocompromised state  Neurological: Negative for dizziness, weakness and numbness  Hematological: Does not bruise/bleed easily                 Vitals  Vitals:    08/01/22 1541   BP: 122/74   BP Location: Left arm   Patient Position: Sitting   Cuff Size: Standard   Pulse: 78   SpO2: 95%   Weight: 131 kg (288 lb)   Height: 5' 11" (1 803 m)       Physical Exam  Vitals reviewed  Constitutional:       General: He is not in acute distress  Appearance: Normal appearance  He is not ill-appearing or toxic-appearing  HENT:      Head: Normocephalic and atraumatic  Eyes:      General: No scleral icterus  Conjunctiva/sclera: Conjunctivae normal    Cardiovascular:      Rate and Rhythm: Normal rate  Pulmonary:      Effort: Pulmonary effort is normal  No respiratory distress  Comments: Chronic O2 at 3 L NC  Abdominal:      Palpations: Abdomen is soft  Tenderness: There is no abdominal tenderness  There is no right CVA tenderness or left CVA tenderness  Hernia: No hernia is present  Genitourinary:     Comments: VIVIANA reveal smooth symmetric prostate, no palpable nodules or masses  Estimated gland size between 35-40 g  Musculoskeletal:      Cervical back: Normal range of motion  Right lower leg: No edema  Left lower leg: No edema  Skin:     General: Skin is warm and dry  Coloration: Skin is not jaundiced or pale  Neurological:      General: No focal deficit present  Mental Status: He is alert and oriented to person, place, and time  Mental status is at baseline  Gait: Gait normal    Psychiatric:         Mood and Affect: Mood normal          Behavior: Behavior normal          Thought Content:  Thought content normal          Judgment: Judgment normal          Past History  Past Medical History:   Diagnosis Date    COPD (chronic obstructive pulmonary disease) ([facility] [address] )     HTN (hypertension)     Hyperlipidemia     Obesity     Polycythemia      Social History     Socioeconomic History    Marital status: /Civil Union     Spouse name: None    Number of children: None    Years of education: None    Highest education level: None   Occupational History    None   Tobacco Use    Smoking status: Former Smoker     Packs/day: 1 00     Quit date: 2022     Years since quittin 1    Smokeless tobacco: Never Used   Vaping Use    Vaping Use: Never used   Substance and Sexual Activity    Alcohol use: Not Currently    Drug use: Not Currently    Sexual activity: None   Other Topics Concern    None   Social History Narrative    None     Social Determinants of Health     Financial Resource Strain: Not on file   Food Insecurity: No Food Insecurity    Worried About Running Out of Food in the Last Year: Never true    Alfred of Food in the Last Year: Never true   Transportation Needs: No Transportation Needs    Lack of Transportation (Medical): No    Lack of Transportation (Non-Medical): No   Physical Activity: Not on file   Stress: Not on file   Social Connections: Not on file   Intimate Partner Violence: Not on file   Housing Stability: Low Risk     Unable to Pay for Housing in the Last Year: No    Number of Places Lived in the Last Year: 1    Unstable Housing in the Last Year: No     Social History     Tobacco Use   Smoking Status Former Smoker    Packs/day: 1 00    Quit date: 2022    Years since quittin 1   Smokeless Tobacco Never Used     Family History   Problem Relation Age of Onset    Heart attack Mother     Heart disease Brother        The following portions of the patient's history were reviewed and updated as appropriate allergies, current medications, past medical history, past social history, past surgical history and problem list    Imaging:    Results  No results found for this or any previous visit (from the past 1 hour(s))  ]  No results found for: PSA  Lab Results   Component Value Date    CALCIUM 8 8 2022    K 3 5 2022    CO2 36 (H) 2022    CL 97 (L) 2022    BUN 13 2022    CREATININE 0 59 (L) 2022     Lab Results   Component Value Date    WBC 12 08 (H) 2022    HGB 13 9 2022    HCT 43 7 2022    MCV 94 2022  06/02/2022       Please Note:  Voice dictation software has been used to create this document  There may be inadvertent transcriptions errors       Preston Griggs

## 2022-08-01 NOTE — PROGRESS NOTES
Pt was to schedule f/u appt for 3 months, November  Pt refused stating he doesn't leave the house during flu season  Requested to come in March or April 2023   Added pt to recall list

## 2022-08-05 ENCOUNTER — TELEPHONE (OUTPATIENT)
Dept: UROLOGY | Facility: AMBULATORY SURGERY CENTER | Age: 67
End: 2022-08-05

## 2022-08-05 NOTE — TELEPHONE ENCOUNTER
Pt called and stated he would like to have his 3 month fu appt if possible to be VV due to pt having severe COPD and doesn't  Like to come out in the winter months    Pt call [phone]

## 2022-08-08 NOTE — TELEPHONE ENCOUNTER
Telephone visit is scheduled with Jayjay Anderson on 11/14/22 at 9:30  Detailed message left for patient

## 2022-08-09 NOTE — TELEPHONE ENCOUNTER
Patient called back and stated he needed the VV in the afternoon    Appointment changed to 11/14/22 at 130 pm

## 2022-11-14 ENCOUNTER — TELEMEDICINE (OUTPATIENT)
Dept: UROLOGY | Facility: CLINIC | Age: 67
End: 2022-11-14

## 2022-11-14 DIAGNOSIS — G62.9 NEUROPATHY: ICD-10-CM

## 2022-11-14 DIAGNOSIS — N40.1 BENIGN PROSTATIC HYPERPLASIA WITH NOCTURIA: Primary | ICD-10-CM

## 2022-11-14 DIAGNOSIS — Z12.5 PROSTATE CANCER SCREENING: ICD-10-CM

## 2022-11-14 DIAGNOSIS — R35.1 BENIGN PROSTATIC HYPERPLASIA WITH NOCTURIA: Primary | ICD-10-CM

## 2022-11-14 NOTE — PROGRESS NOTES
Virtual Regular Visit    Verification of patient location:    Patient is located in the following state in which I hold an active license [place]      Assessment/Plan:    1  BPH with LUTS  · Continue Saw Palmetto daily  · Can try finasteride 5 mg daily if symptoms worsen  · Follow-up in 1 year or sooner as needed  2  Prostate Cancer Screening  · Negative family history of prostate cancer  · PSA 0 87 (7/01/2022)  · VIVIANA unable to be performed due to telephone visit  · Repeat PSA in 1 year    3  Neuropathy  · Ambulatory referral to Family Medicine        Problem List Items Addressed This Visit    None     Visit Diagnoses     Benign prostatic hyperplasia with nocturia    -  Primary    Prostate cancer screening        Relevant Orders    PSA, Total Screen    Neuropathy        Relevant Orders    Ambulatory Referral to [facility]               Reason for visit is No chief complaint on file  Encounter provider AMALIA Martinez    Provider located at [address]  [facility] [address]      Recent Visits  No visits were found meeting these conditions  Showing recent visits within past 7 days and meeting all other requirements  Today's Visits  Date Type Provider Dept   11/14/22 AMALIA Mitchell  Urology Xochilt Bella today's visits and meeting all other requirements  Future Appointments  No visits were found meeting these conditions  Showing future appointments within next 150 days and meeting all other requirements       The patient was identified by name and date of birth  Eliud Valerio Hermanjerrod was informed that this is a telemedicine visit and that the visit is being conducted through Telephone  My office door was closed  No one else was in the room  He acknowledged consent and understanding of privacy and security of the video platform   The patient has agreed to participate and understands they can discontinue the visit at any time  Patient is aware this is a billable service  Subjective  Darya Ramirez is a 75 y o  male 3 month follow-up evaluation of BPH with LUTS  HPI     He was initially seen by me on 8/01/2022 for complaints of urinary frequency, weak urinary stream, and nocturia 3 times per night  This has been ongoing for 1-2 years  He does take furosemide daily for lower extremity edema  No prior urologic evaluation  He primarily drinks coffee about 2 cups, water 3 glasses, 6 oz of milk with dinner, and mixed fruit juice every other day  I recommended a trial of finasteride 5 mg daily as well as over counter saw palmetto as he had an allergy to Sulfa drugs  He did not start taking finasteride but was using Saw palmetto and reports a mild improvement in his urinary symptoms with use of Saw [place]       Past Medical History:   Diagnosis Date   • COPD (chronic obstructive pulmonary disease) ([facility] [address] )    • HTN (hypertension)    • Hyperlipidemia    • Obesity    • Polycythemia        Past Surgical History:   Procedure Laterality Date   • ARTHROSCOPY KNEE Left 2002   • COLONOSCOPY         Current Outpatient Medications   Medication Sig Dispense Refill   • albuterol (PROVENTIL HFA,VENTOLIN HFA) 90 mcg/act inhaler Inhale 2 puffs 4 (four) times a day 18 g 0   • aspirin 81 mg chewable tablet Chew 162 mg daily     • B Complex CAPS Take 1 capsule by mouth daily     • cyanocobalamin (VITAMIN B-12) 500 MCG tablet Take 500 mcg by mouth     • finasteride (PROSCAR) 5 mg tablet Take 1 tablet (5 mg total) by mouth daily 30 tablet 0   • fluticasone (Flovent HFA) 220 mcg/act inhaler Inhale 2 puffs 2 (two) times a day     • furosemide (LASIX) 40 mg tablet Take 1 5 tablets (60 mg total) by mouth daily (Patient taking differently: Take 60 mg by mouth daily One daily) 45 tablet 0   • ibuprofen (MOTRIN) 400 mg tablet Take 1 tablet (400 mg total) by mouth 3 (three) times a day for 5 days 15 tablet 0   • ketoconazole (NIZORAL) 2 % shampoo      • Multiple Vitamin (Multi Vitamin Daily) TABS Take 1 tablet by mouth daily     • nicotine (NICODERM CQ) 21 mg/24 hr TD 24 hr patch Place 1 patch on the skin daily (Patient not taking: Reported on 8/1/2022) 28 patch 0   • olmesartan (BENICAR) 5 mg tablet Take 1 tablet by mouth daily     • Omega-3 Fatty Acids (fish oil) 1,000 mg Take 1,000 mg by mouth     • Probiotic Product (Misc Intestinal Kajal Regulat) CAPS Take 1 capsule by mouth     • selenium 50 MCG TABS Take 200 mcg by mouth daily     • vitamin E, tocopherol, 400 units capsule Take 400 Units by mouth       No current facility-administered medications for this visit  Allergies   Allergen Reactions   • Other Anaphylaxis     bees   • Lansoprazole Rash     Rash      • Clonidine Other (See Comments)   • Codeine Nausea Only and Vomiting     n/v  pill form only; cough medicine okay     • Levaquin [Levofloxacin] Hives   • Lisinopril Cough     cough     • Oxycodone-Acetaminophen Headache     headache   • Prednisone Other (See Comments)     unfriendly   • Statins Myalgia and Other (See Comments)     Elevated bp  elevates bp     • Sulfa Antibiotics Other (See Comments)     ? • Ampicillin Rash     Rash     • Ranitidine Rash     Rash forehead, pepcid         Review of Systems   Respiratory: Negative for chest tightness and shortness of breath  Cardiovascular: Negative for chest pain and leg swelling  Gastrointestinal: Negative for abdominal pain  Genitourinary: Positive for frequency  Negative for difficulty urinating, dysuria and hematuria  Neurological: Positive for numbness  Negative for dizziness and weakness  There were no vitals filed for this visit  Physical Exam  Vitals reviewed: Unable to perform as this was telephone visit             I spent 20 minutes directly with the patient during this visit

## 2023-01-02 ENCOUNTER — TELEPHONE (OUTPATIENT)
Dept: CT IMAGING | Facility: HOSPITAL | Age: 68
End: 2023-01-02

## 2023-01-02 ENCOUNTER — HOSPITAL ENCOUNTER (INPATIENT)
Facility: HOSPITAL | Age: 68
LOS: 3 days | Discharge: HOME | End: 2023-01-05
Attending: EMERGENCY MEDICINE | Admitting: FAMILY MEDICINE

## 2023-01-02 ENCOUNTER — APPOINTMENT (EMERGENCY)
Dept: RADIOLOGY | Facility: HOSPITAL | Age: 68
End: 2023-01-02

## 2023-01-02 ENCOUNTER — APPOINTMENT (EMERGENCY)
Dept: CT IMAGING | Facility: HOSPITAL | Age: 68
End: 2023-01-02

## 2023-01-02 DIAGNOSIS — I50.9 ACUTE ON CHRONIC CONGESTIVE HEART FAILURE, UNSPECIFIED HEART FAILURE TYPE (HCC): ICD-10-CM

## 2023-01-02 DIAGNOSIS — J96.22 ACUTE ON CHRONIC RESPIRATORY FAILURE WITH HYPOXIA AND HYPERCAPNIA (HCC): ICD-10-CM

## 2023-01-02 DIAGNOSIS — M54.2 CERVICALGIA: ICD-10-CM

## 2023-01-02 DIAGNOSIS — Z72.0 TOBACCO ABUSE: ICD-10-CM

## 2023-01-02 DIAGNOSIS — J44.9 COPD, SEVERE (HCC): ICD-10-CM

## 2023-01-02 DIAGNOSIS — J96.21 ACUTE ON CHRONIC RESPIRATORY FAILURE WITH HYPOXIA AND HYPERCAPNIA (HCC): ICD-10-CM

## 2023-01-02 DIAGNOSIS — J30.9 ALLERGIC RHINITIS, UNSPECIFIED SEASONALITY, UNSPECIFIED TRIGGER: ICD-10-CM

## 2023-01-02 DIAGNOSIS — J44.1 COPD EXACERBATION (HCC): Primary | ICD-10-CM

## 2023-01-02 PROBLEM — I50.33 ACUTE ON CHRONIC DIASTOLIC (CONGESTIVE) HEART FAILURE (HCC): Status: ACTIVE | Noted: 2023-01-02

## 2023-01-02 PROBLEM — R74.8 ELEVATED LIPASE: Status: ACTIVE | Noted: 2023-01-02

## 2023-01-02 PROBLEM — J96.11 CHRONIC RESPIRATORY FAILURE WITH HYPOXIA (HCC): Status: ACTIVE | Noted: 2023-01-02

## 2023-01-02 LAB
ALBUMIN SERPL BCP-MCNC: 3.6 G/DL (ref 3.5–5)
ALP SERPL-CCNC: 63 U/L (ref 46–116)
ALT SERPL W P-5'-P-CCNC: 40 U/L (ref 12–78)
ANION GAP SERPL CALCULATED.3IONS-SCNC: 3 MMOL/L (ref 4–13)
APTT PPP: 30 SECONDS (ref 23–37)
AST SERPL W P-5'-P-CCNC: 38 U/L (ref 5–45)
BASOPHILS # BLD AUTO: 0.08 THOUSANDS/ÂΜL (ref 0–0.1)
BASOPHILS NFR BLD AUTO: 1 % (ref 0–1)
BILIRUB SERPL-MCNC: 0.39 MG/DL (ref 0.2–1)
BILIRUB UR QL STRIP: NEGATIVE
BUN SERPL-MCNC: 15 MG/DL (ref 5–25)
CALCIUM SERPL-MCNC: 9.5 MG/DL (ref 8.3–10.1)
CARDIAC TROPONIN I PNL SERPL HS: 10 NG/L
CHLORIDE SERPL-SCNC: 99 MMOL/L (ref 96–108)
CLARITY UR: CLEAR
CO2 SERPL-SCNC: 40 MMOL/L (ref 21–32)
COLOR UR: YELLOW
CREAT SERPL-MCNC: 0.76 MG/DL (ref 0.6–1.3)
EOSINOPHIL # BLD AUTO: 0.56 THOUSAND/ÂΜL (ref 0–0.61)
EOSINOPHIL NFR BLD AUTO: 7 % (ref 0–6)
ERYTHROCYTE [DISTWIDTH] IN BLOOD BY AUTOMATED COUNT: 14 % (ref 11.6–15.1)
FLUAV RNA RESP QL NAA+PROBE: NEGATIVE
FLUBV RNA RESP QL NAA+PROBE: NEGATIVE
GFR SERPL CREATININE-BSD FRML MDRD: 93 ML/MIN/1.73SQ M
GLUCOSE SERPL-MCNC: 103 MG/DL (ref 65–140)
GLUCOSE UR STRIP-MCNC: NEGATIVE MG/DL
HCT VFR BLD AUTO: 46.9 % (ref 36.5–49.3)
HGB BLD-MCNC: 15 G/DL (ref 12–17)
HGB UR QL STRIP.AUTO: NEGATIVE
IMM GRANULOCYTES # BLD AUTO: 0.02 THOUSAND/UL (ref 0–0.2)
IMM GRANULOCYTES NFR BLD AUTO: 0 % (ref 0–2)
INR PPP: 0.98 (ref 0.84–1.19)
KETONES UR STRIP-MCNC: NEGATIVE MG/DL
LACTATE SERPL-SCNC: 1 MMOL/L (ref 0.5–2)
LEUKOCYTE ESTERASE UR QL STRIP: NEGATIVE
LIPASE SERPL-CCNC: 792 U/L (ref 73–393)
LYMPHOCYTES # BLD AUTO: 2.11 THOUSANDS/ÂΜL (ref 0.6–4.47)
LYMPHOCYTES NFR BLD AUTO: 25 % (ref 14–44)
MCH RBC QN AUTO: 30.6 PG (ref 26.8–34.3)
MCHC RBC AUTO-ENTMCNC: 32 G/DL (ref 31.4–37.4)
MCV RBC AUTO: 96 FL (ref 82–98)
MONOCYTES # BLD AUTO: 1.54 THOUSAND/ÂΜL (ref 0.17–1.22)
MONOCYTES NFR BLD AUTO: 19 % (ref 4–12)
NEUTROPHILS # BLD AUTO: 3.99 THOUSANDS/ÂΜL (ref 1.85–7.62)
NEUTS SEG NFR BLD AUTO: 48 % (ref 43–75)
NITRITE UR QL STRIP: NEGATIVE
NRBC BLD AUTO-RTO: 0 /100 WBCS
NT-PROBNP SERPL-MCNC: 33 PG/ML
PH UR STRIP.AUTO: 7 [PH]
PLATELET # BLD AUTO: 248 THOUSANDS/UL (ref 149–390)
PMV BLD AUTO: 9.2 FL (ref 8.9–12.7)
POTASSIUM SERPL-SCNC: 4.2 MMOL/L (ref 3.5–5.3)
PROCALCITONIN SERPL-MCNC: <0.05 NG/ML
PROT SERPL-MCNC: 7.8 G/DL (ref 6.4–8.4)
PROT UR STRIP-MCNC: NEGATIVE MG/DL
PROTHROMBIN TIME: 13.1 SECONDS (ref 11.6–14.5)
RBC # BLD AUTO: 4.9 MILLION/UL (ref 3.88–5.62)
RSV RNA RESP QL NAA+PROBE: NEGATIVE
SARS-COV-2 RNA RESP QL NAA+PROBE: NEGATIVE
SODIUM SERPL-SCNC: 142 MMOL/L (ref 135–147)
SP GR UR STRIP.AUTO: 1.01 (ref 1–1.03)
UROBILINOGEN UR QL STRIP.AUTO: 0.2 E.U./DL
WBC # BLD AUTO: 8.3 THOUSAND/UL (ref 4.31–10.16)

## 2023-01-02 RX ORDER — IPRATROPIUM BROMIDE AND ALBUTEROL SULFATE 2.5; .5 MG/3ML; MG/3ML
3 SOLUTION RESPIRATORY (INHALATION) ONCE
Status: COMPLETED | OUTPATIENT
Start: 2023-01-02 | End: 2023-01-02

## 2023-01-02 RX ORDER — ASPIRIN 81 MG/1
162 TABLET, CHEWABLE ORAL DAILY
Status: DISCONTINUED | OUTPATIENT
Start: 2023-01-03 | End: 2023-01-05 | Stop reason: HOSPADM

## 2023-01-02 RX ORDER — UREA 10 %
133 LOTION (ML) TOPICAL 2 TIMES DAILY
COMMUNITY

## 2023-01-02 RX ORDER — FLUTICASONE PROPIONATE 220 UG/1
2 AEROSOL, METERED RESPIRATORY (INHALATION) 2 TIMES DAILY
Status: DISCONTINUED | OUTPATIENT
Start: 2023-01-02 | End: 2023-01-05 | Stop reason: HOSPADM

## 2023-01-02 RX ORDER — ENOXAPARIN SODIUM 100 MG/ML
40 INJECTION SUBCUTANEOUS
Status: DISCONTINUED | OUTPATIENT
Start: 2023-01-03 | End: 2023-01-05 | Stop reason: HOSPADM

## 2023-01-02 RX ORDER — IPRATROPIUM BROMIDE AND ALBUTEROL SULFATE 2.5; .5 MG/3ML; MG/3ML
3 SOLUTION RESPIRATORY (INHALATION)
Status: DISCONTINUED | OUTPATIENT
Start: 2023-01-02 | End: 2023-01-05 | Stop reason: HOSPADM

## 2023-01-02 RX ORDER — CEFTRIAXONE 1 G/50ML
1000 INJECTION, SOLUTION INTRAVENOUS EVERY 24 HOURS
Status: DISCONTINUED | OUTPATIENT
Start: 2023-01-02 | End: 2023-01-05 | Stop reason: HOSPADM

## 2023-01-02 RX ORDER — CALCIUM CARBONATE 200(500)MG
1000 TABLET,CHEWABLE ORAL DAILY PRN
Status: DISCONTINUED | OUTPATIENT
Start: 2023-01-02 | End: 2023-01-05 | Stop reason: HOSPADM

## 2023-01-02 RX ORDER — ACETAMINOPHEN 325 MG/1
650 TABLET ORAL EVERY 6 HOURS PRN
Status: DISCONTINUED | OUTPATIENT
Start: 2023-01-02 | End: 2023-01-05 | Stop reason: HOSPADM

## 2023-01-02 RX ORDER — FUROSEMIDE 10 MG/ML
40 INJECTION INTRAMUSCULAR; INTRAVENOUS
Status: DISCONTINUED | OUTPATIENT
Start: 2023-01-03 | End: 2023-01-05 | Stop reason: HOSPADM

## 2023-01-02 RX ORDER — FINASTERIDE 5 MG/1
5 TABLET, FILM COATED ORAL DAILY
Status: DISCONTINUED | OUTPATIENT
Start: 2023-01-03 | End: 2023-01-05 | Stop reason: HOSPADM

## 2023-01-02 RX ORDER — FUROSEMIDE 10 MG/ML
40 INJECTION INTRAMUSCULAR; INTRAVENOUS ONCE
Status: COMPLETED | OUTPATIENT
Start: 2023-01-02 | End: 2023-01-02

## 2023-01-02 RX ORDER — ONDANSETRON 2 MG/ML
4 INJECTION INTRAMUSCULAR; INTRAVENOUS EVERY 6 HOURS PRN
Status: DISCONTINUED | OUTPATIENT
Start: 2023-01-02 | End: 2023-01-05 | Stop reason: HOSPADM

## 2023-01-02 RX ORDER — NICOTINE 21 MG/24HR
14 PATCH, TRANSDERMAL 24 HOURS TRANSDERMAL DAILY
Status: DISCONTINUED | OUTPATIENT
Start: 2023-01-03 | End: 2023-01-02

## 2023-01-02 RX ORDER — NICOTINE 21 MG/24HR
14 PATCH, TRANSDERMAL 24 HOURS TRANSDERMAL DAILY
Status: DISCONTINUED | OUTPATIENT
Start: 2023-01-02 | End: 2023-01-05 | Stop reason: HOSPADM

## 2023-01-02 RX ORDER — ALBUTEROL SULFATE 90 UG/1
2 AEROSOL, METERED RESPIRATORY (INHALATION) 4 TIMES DAILY
Status: DISCONTINUED | OUTPATIENT
Start: 2023-01-02 | End: 2023-01-02

## 2023-01-02 RX ADMIN — IPRATROPIUM BROMIDE AND ALBUTEROL SULFATE 3 ML: .5; 3 SOLUTION RESPIRATORY (INHALATION) at 16:15

## 2023-01-02 RX ADMIN — CEFTRIAXONE 1000 MG: 1 INJECTION, SOLUTION INTRAVENOUS at 21:24

## 2023-01-02 RX ADMIN — IPRATROPIUM BROMIDE AND ALBUTEROL SULFATE 3 ML: 2.5; .5 SOLUTION RESPIRATORY (INHALATION) at 20:07

## 2023-01-02 RX ADMIN — IPRATROPIUM BROMIDE AND ALBUTEROL SULFATE 3 ML: .5; 3 SOLUTION RESPIRATORY (INHALATION) at 12:45

## 2023-01-02 RX ADMIN — FUROSEMIDE 40 MG: 10 INJECTION, SOLUTION INTRAMUSCULAR; INTRAVENOUS at 16:15

## 2023-01-02 NOTE — TELEPHONE ENCOUNTER
spoke with MED SURG RN, pt still refusing to lay flat, scan still on hold, will call back later and assess   1839hrs

## 2023-01-02 NOTE — ASSESSMENT & PLAN NOTE
Wt Readings from Last 3 Encounters:   08/01/22 131 kg (288 lb)   06/03/22 133 kg (293 lb 3 2 oz)     States that he has gained 5 pounds in the last few days  States he takes Lasix 40 mg daily and sometimes takes an additional 20 mg in the afternoon if he feels he is getting swollen once or twice a month  Placed on Lasix 40 mg IV twice daily x3 doses and observe response  Monitor renal function closely  We will repeat 2D echo to evaluate LV function    Placed on low-salt diet and restriction of 1500 mL

## 2023-01-02 NOTE — ASSESSMENT & PLAN NOTE
patient states that he uses 3 liters of oxygen at baseline but for the last 2 days he has been feeling worsening shortness of breath wheezing and he feels he has a pneumonia  To have some COPD exacerbation  Chest x-ray does not show a pneumonia at this time however will treat with antibiotics due to possibility of bronchitis  placed on IV Rocephin as patient is allergic to Zithromax  cont oxygen

## 2023-01-02 NOTE — ED PROVIDER NOTES
History  Chief Complaint   Patient presents with   • Shortness of Breath     Patient having increased sob  Patient concerned he has pneumonia  Patient complains of increasing shortness of breath over the last 1 week  Has gained approximately 5 pounds in the last 10 days  Is on Lasix  Congested and coughing  Has dyspnea on exertion  Had to increase his home oxygen  Normally on 3 L nasal cannula  Increased leg swelling  No chest pain  Has been having some fevers and chills  No nausea or vomiting or diarrhea  History provided by:  Patient   used: No    Shortness of Breath  Severity:  Mild  Onset quality:  Gradual  Duration:  1 week  Timing:  Constant  Progression:  Worsening  Chronicity:  New  Context: URI    Context: not smoke exposure    Relieved by:  Oxygen  Worsened by:  Exertion  Ineffective treatments:  None tried  Associated symptoms: cough, fever, sputum production and wheezing    Associated symptoms: no abdominal pain, no chest pain, no ear pain, no headaches, no neck pain, no rash, no sore throat and no vomiting        Prior to Admission Medications   Prescriptions Last Dose Informant Patient Reported? Taking?    B Complex CAPS   Yes Yes   Sig: Take 1 capsule by mouth daily   Multiple Vitamin (Multi Vitamin Daily) TABS   Yes Yes   Sig: Take 1 tablet by mouth daily   Omega-3 Fatty Acids (fish oil) 1,000 mg   Yes Yes   Sig: Take 1,000 mg by mouth   Probiotic Product (Misc Intestinal Kajal Regulat) CAPS   Yes Yes   Sig: Take 1 capsule by mouth   albuterol (PROVENTIL HFA,VENTOLIN HFA) 90 mcg/act inhaler   No Yes   Sig: Inhale 2 puffs 4 (four) times a day   aspirin 81 mg chewable tablet   Yes Yes   Sig: Chew 162 mg daily   cyanocobalamin (VITAMIN B-12) 500 MCG tablet   Yes Yes   Sig: Take 500 mcg by mouth   finasteride (PROSCAR) 5 mg tablet   No Yes   Sig: Take 1 tablet (5 mg total) by mouth daily   fluticasone (Flovent HFA) 220 mcg/act inhaler   Yes Yes   Sig: Inhale 2 puffs 2 (two) times a day   furosemide (LASIX) 40 mg tablet   No Yes   Sig: Take 1 5 tablets (60 mg total) by mouth daily   Patient taking differently: Take 60 mg by mouth daily One daily   ketoconazole (NIZORAL) 2 % shampoo   Yes Yes   nicotine (NICODERM CQ) 21 mg/24 hr TD 24 hr patch Not Taking  No No   Sig: Place 1 patch on the skin daily   Patient not taking: Reported on 2022   olmesartan (BENICAR) 5 mg tablet   Yes Yes   Sig: Take 1 tablet by mouth daily   selenium 50 MCG TABS   Yes Yes   Sig: Take 200 mcg by mouth daily   vitamin E, tocopherol, 400 units capsule   Yes Yes   Sig: Take 400 Units by mouth      Facility-Administered Medications: None       Past Medical History:   Diagnosis Date   • COPD (chronic obstructive pulmonary disease) (HCC)    • HTN (hypertension)    • Hyperlipidemia    • Obesity    • Polycythemia        Past Surgical History:   Procedure Laterality Date   • ARTHROSCOPY KNEE Left    • COLONOSCOPY         Family History   Problem Relation Age of Onset   • Heart attack Mother    • Heart disease Brother      I have reviewed and agree with the history as documented  E-Cigarette/Vaping   • E-Cigarette Use Never User      E-Cigarette/Vaping Substances     Social History     Tobacco Use   • Smoking status: Former     Packs/day: 1 00     Types: Cigarettes     Quit date: 2022     Years since quittin 6   • Smokeless tobacco: Never   Vaping Use   • Vaping Use: Never used   Substance Use Topics   • Alcohol use: Not Currently   • Drug use: Not Currently       Review of Systems   Constitutional: Positive for fever  Negative for chills  HENT: Positive for congestion  Negative for ear pain, hearing loss, sore throat, trouble swallowing and voice change  Eyes: Negative for pain and discharge  Respiratory: Positive for cough, sputum production, shortness of breath and wheezing  Cardiovascular: Positive for leg swelling  Negative for chest pain and palpitations     Gastrointestinal: Negative for abdominal pain, blood in stool, constipation, diarrhea, nausea and vomiting  Genitourinary: Negative for dysuria, flank pain, frequency and hematuria  Musculoskeletal: Negative for joint swelling, neck pain and neck stiffness  Skin: Negative for rash and wound  Neurological: Negative for dizziness, seizures, syncope, facial asymmetry and headaches  Psychiatric/Behavioral: Negative for hallucinations, self-injury and suicidal ideas  All other systems reviewed and are negative  Physical Exam  Physical Exam  Vitals and nursing note reviewed  Constitutional:       General: He is not in acute distress  Appearance: He is well-developed  HENT:      Head: Normocephalic and atraumatic  Right Ear: External ear normal       Left Ear: External ear normal    Eyes:      General: No scleral icterus  Right eye: No discharge  Left eye: No discharge  Extraocular Movements: Extraocular movements intact  Conjunctiva/sclera: Conjunctivae normal    Cardiovascular:      Rate and Rhythm: Normal rate and regular rhythm  Heart sounds: Normal heart sounds  No murmur heard  Pulmonary:      Effort: Pulmonary effort is normal       Breath sounds: Decreased breath sounds present  No wheezing or rales  Comments: Crackles at bases  Abdominal:      General: Bowel sounds are normal  There is no distension  Palpations: Abdomen is soft  Tenderness: There is no abdominal tenderness  There is no guarding or rebound  Musculoskeletal:         General: No deformity  Normal range of motion  Cervical back: Normal range of motion and neck supple  Skin:     General: Skin is warm and dry  Findings: No rash  Neurological:      General: No focal deficit present  Mental Status: He is alert and oriented to person, place, and time  Cranial Nerves: No cranial nerve deficit     Psychiatric:         Mood and Affect: Mood normal          Behavior: Behavior normal          Thought Content:  Thought content normal          Judgment: Judgment normal          Vital Signs  ED Triage Vitals [01/02/23 1210]   Temperature Pulse Respirations Blood Pressure SpO2   98 °F (36 7 °C) 79 18 146/67 97 %      Temp Source Heart Rate Source Patient Position - Orthostatic VS BP Location FiO2 (%)   Oral Monitor Sitting Left arm --      Pain Score       --           Vitals:    01/02/23 1210   BP: 146/67   Pulse: 79   Patient Position - Orthostatic VS: Sitting         Visual Acuity      ED Medications  Medications   furosemide (LASIX) injection 40 mg (has no administration in time range)   azithromycin (ZITHROMAX) 500 mg in sodium chloride 0 9 % 250 mL IVPB (has no administration in time range)   ipratropium-albuterol (DUO-NEB) 0 5-2 5 mg/3 mL inhalation solution 3 mL (has no administration in time range)   ipratropium-albuterol (DUO-NEB) 0 5-2 5 mg/3 mL inhalation solution 3 mL (3 mL Nebulization Given 1/2/23 1245)       Diagnostic Studies  Results Reviewed     Procedure Component Value Units Date/Time    UA w Reflex to Microscopic w Reflex to Culture [[id number]] Collected: 01/02/23 1420    Lab Status: Final result Specimen: Urine, Clean Catch Updated: 01/02/23 1427     Color, UA Yellow     Clarity, UA Clear     Specific Gravity, UA 1 015     pH, UA 7 0     Leukocytes, UA Negative     Nitrite, UA Negative     Protein, UA Negative mg/dl      Glucose, UA Negative mg/dl      Ketones, UA Negative mg/dl      Urobilinogen, UA 0 2 E U /dl      Bilirubin, UA Negative     Occult Blood, UA Negative    HS Troponin I 4hr [[id number]]     Lab Status: No result Specimen: Blood     FLU/RSV/COVID - if FLU/RSV clinically relevant [[id number]]  (Normal) Collected: 01/02/23 1227    Lab Status: Final result Specimen: Nares from Nasopharyngeal Swab Updated: 01/02/23 1312     SARS-CoV-2 Negative     INFLUENZA A PCR Negative     INFLUENZA B PCR Negative     RSV PCR Negative    Narrative:      FOR PEDIATRIC PATIENTS - copy/paste COVID Guidelines URL to browser: [email or web address]    SARS-CoV-2 assay is a Nucleic Acid Amplification assay intended for the  qualitative detection of nucleic acid from SARS-CoV-2 in nasopharyngeal  swabs  Results are for the presumptive identification of SARS-CoV-2 RNA  Positive results are indicative of infection with SARS-CoV-2, the virus  causing COVID-19, but do not rule out bacterial infection or co-infection  with other viruses  Laboratories within the [place] and its  territories are required to report all positive results to the appropriate  public health authorities  Negative results do not preclude SARS-CoV-2  infection and should not be used as the sole basis for treatment or other  patient management decisions  Negative results must be combined with  clinical observations, patient history, and epidemiological information  This test has not been FDA cleared or approved  This test has been authorized by FDA under an Emergency Use Authorization  (EUA)  This test is only authorized for the duration of time the  declaration that circumstances exist justifying the authorization of the  emergency use of an in vitro diagnostic tests for detection of SARS-CoV-2  virus and/or diagnosis of COVID-19 infection under section 564(b)(1) of  the Act, 21 U  S C  621YED-7(F)(4), unless the authorization is terminated  or revoked sooner  The test has been validated but independent review by FDA  and CLIA is pending  Test performed using [employer] GeneXpert: This RT-PCR assay targets N2,  a region unique to SARS-CoV-2  A conserved region in the E-gene was chosen  for pan-Sarbecovirus detection which includes SARS-CoV-2  According to CMS-2020-01-R, this platform meets the definition of high-throughput technology      Procalcitonin [[id number]]  (Normal) Collected: 01/02/23 1227    Lab Status: Final result Specimen: Blood from Arm, Right Updated: 01/02/23 1303     Procalcitonin <0 05 ng/ml     HS Troponin 0hr (reflex protocol) [[id number]]  (Normal) Collected: 01/02/23 1227    Lab Status: Final result Specimen: Blood from Arm, Right Updated: 01/02/23 1259     hs TnI 0hr 10 ng/L     HS Troponin I 2hr [[id number]]     Lab Status: No result Specimen: Blood     Lipase [[id number]]  (Abnormal) Collected: 01/02/23 1227    Lab Status: Final result Specimen: Blood from Arm, Right Updated: 01/02/23 1259     Lipase 792 u/L     NT-BNP PRO [[id number]]  (Normal) Collected: 01/02/23 1227    Lab Status: Final result Specimen: Blood from Arm, Right Updated: 01/02/23 1259     NT-proBNP 33 pg/mL     Lactic acid [[id number]]  (Normal) Collected: 01/02/23 1227    Lab Status: Final result Specimen: Blood from Arm, Right Updated: 01/02/23 1258     LACTIC ACID 1 0 mmol/L     Narrative:      Result may be elevated if tourniquet was used during collection      Comprehensive metabolic panel [[id number]]  (Abnormal) Collected: 01/02/23 1227    Lab Status: Final result Specimen: Blood from Arm, Right Updated: 01/02/23 1254     Sodium 142 mmol/L      Potassium 4 2 mmol/L      Chloride 99 mmol/L      CO2 40 mmol/L      ANION GAP 3 mmol/L      BUN 15 mg/dL      Creatinine 0 76 mg/dL      Glucose 103 mg/dL      Calcium 9 5 mg/dL      AST 38 U/L      ALT 40 U/L      Alkaline Phosphatase 63 U/L      Total Protein 7 8 g/dL      Albumin 3 6 g/dL      Total Bilirubin 0 39 mg/dL      eGFR 93 ml/min/1 73sq m     Narrative:      [place] guidelines for Chronic Kidney Disease (CKD):   •  Stage 1 with normal or high GFR (GFR > 90 mL/min/1 73 square meters)  •  Stage 2 Mild CKD (GFR = 60-89 mL/min/1 73 square meters)  •  Stage 3A Moderate CKD (GFR = 45-59 mL/min/1 73 square meters)  •  Stage 3B Moderate CKD (GFR = 30-44 mL/min/1 73 square meters)  •  Stage 4 Severe CKD (GFR = 15-29 mL/min/1 73 square meters)  •  Stage 5 End Stage CKD (GFR <15 mL/min/1 73 square meters)  Note: GFR calculation is accurate only with a steady state creatinine    Protime-INR [[id number]]  (Normal) Collected: 01/02/23 1227    Lab Status: Final result Specimen: Blood from Arm, Right Updated: 01/02/23 1247     Protime 13 1 seconds      INR 0 98    APTT [[id number]]  (Normal) Collected: 01/02/23 1227    Lab Status: Final result Specimen: Blood from Arm, Right Updated: 01/02/23 1247     PTT 30 seconds     Blood culture #1 [[id number]] Collected: 01/02/23 1234    Lab Status: In process Specimen: Blood from Arm, Left Updated: 01/02/23 1237    CBC and differential [[id number]]  (Abnormal) Collected: 01/02/23 1227    Lab Status: Final result Specimen: Blood from Arm, Right Updated: 01/02/23 1235     WBC 8 30 Thousand/uL      RBC 4 90 Million/uL      Hemoglobin 15 0 g/dL      Hematocrit 46 9 %      MCV 96 fL      MCH 30 6 pg      MCHC 32 0 g/dL      RDW 14 0 %      MPV 9 2 fL      Platelets 070 Thousands/uL      nRBC 0 /100 WBCs      Neutrophils Relative 48 %      Immat GRANS % 0 %      Lymphocytes Relative 25 %      Monocytes Relative 19 %      Eosinophils Relative 7 %      Basophils Relative 1 %      Neutrophils Absolute 3 99 Thousands/µL      Immature Grans Absolute 0 02 Thousand/uL      Lymphocytes Absolute 2 11 Thousands/µL      Monocytes Absolute 1 54 Thousand/µL      Eosinophils Absolute 0 56 Thousand/µL      Basophils Absolute 0 08 Thousands/µL     Blood culture #2 [[id number]] Collected: 01/02/23 1227    Lab Status:  In process Specimen: Blood from Arm, Right Updated: 01/02/23 1232                 XR chest 1 view portable   ED Interpretation by Alisa Acosta MD (01/02 1229)   CHF      PE Study with CT Abdomen and Pelvis with contrast    (Results Pending)              Procedures  ECG 12 Lead Documentation Only    Date/Time: 1/2/2023 12:10 PM  Performed by: Alisa Acosta MD  Authorized by: Alisa Acosta MD     ECG reviewed by me, the ED Provider: yes    Patient location:  ED  Previous ECG: Previous ECG:  Compared to current    Similarity:  No change  Rate:     ECG rate:  80  Rhythm:     Rhythm: sinus rhythm    Ectopy:     Ectopy: none    QRS:     QRS axis:  Normal             ED Course  ED Course as of 01/02/23 1449   Mon Jan 02, 2023   1444 Patient states he cannot have prednisone  And makes him very loopy  Medical Decision Making  Acute on chronic congestive heart failure, unspecified heart failure type [facility]): acute illness or injury  COPD exacerbation ([place] [address] ): acute illness or injury  Amount and/or Complexity of Data Reviewed  Independent Historian: moon  External Data Reviewed: labs, radiology, ECG and notes  Labs: ordered  Decision-making details documented in ED Course  Radiology: independent interpretation performed  Decision-making details documented in ED Course  ECG/medicine tests: independent interpretation performed  Decision-making details documented in ED Course  Risk  Decision regarding hospitalization  Disposition  Final diagnoses:   COPD exacerbation ([place] [address] )   Acute on chronic congestive heart failure, unspecified heart failure type [facility])     Time reflects when diagnosis was documented in both MDM as applicable and the Disposition within this note     Time User Action Codes Description Comment    1/2/2023  2:46 PM Benito Long Add [J44 1] COPD exacerbation ([place] [address] )     1/2/2023  2:46 PM Benito Long Add [I50 9] Acute on chronic congestive heart failure, unspecified heart failure type [facility])       ED Disposition     ED Disposition   Admit    Condition   Stable    Date/Time   Mon Jan 2, 2023  2:48 PM    Comment   Case was discussed with Dr Mimi Baez and the patient's admission status was agreed to be  to the service of Dr Mimi Baez  Follow-up Information    None         Patient's Medications   Discharge Prescriptions    No medications on file       No discharge procedures on file      PDMP Review     None ED Provider  Electronically Signed by           Pavithra Corado MD  01/02/23 838 [facility] Giorgio Lauren MD  01/02/23 2172

## 2023-01-02 NOTE — LETTER
[address] [address] [place] [address]  Dept: [phone]    2023     Patient: Ruchi Brown Batshevajerrod   YOB: 1955   Date of Visit: 2023       To Whom it May Concern:    Pavithra Colon is under my professional care  He was seen in the hospital from 2023   to 23  Jeison Ramirez (: 06/10/1957) is helping our patient to transfer to home  Please excuse Ana Fulton from her work on 2023-for above reason  If you have any questions or concerns, please don't hesitate to call           Sincerely,          Felipe Rowell MD

## 2023-01-02 NOTE — H&P
[address]  H&P- Felecia Ramirez 1955, 75 y o  male MRN: [id number]  Unit/Bed#: -02 Encounter: [id number]  Primary Care Provider: Asia Lopez DO   Date and time admitted to hospital: 1/2/2023 11:58 AM    * Acute on chronic diastolic (congestive) heart failure (HCC)  Assessment & Plan  Wt Readings from Last 3 Encounters:   08/01/22 131 kg (288 lb)   06/03/22 133 kg (293 lb 3 2 oz)     States that he has gained 5 pounds in the last few days  States he takes Lasix 40 mg daily and sometimes takes an additional 20 mg in the afternoon if he feels he is getting swollen once or twice a month  Placed on Lasix 40 mg IV twice daily x3 doses and observe response  Monitor renal function closely  We will repeat 2D echo to evaluate LV function  Placed on low-salt diet and restriction of 1500 mL        COPD, severe ([address] )  Assessment & Plan  patient states that he uses 3 liters of oxygen at baseline but for the last 2 days he has been feeling worsening shortness of breath wheezing and he feels he has a pneumonia  To have some COPD exacerbation  Chest x-ray does not show a pneumonia at this time however will treat with antibiotics due to possibility of bronchitis  placed on IV Rocephin as patient is allergic to Zithromax  cont oxygen    Elevated lipase  Assessment & Plan  currently asymptomatic  Monitor for now    Morbid obesity with BMI of 40 0-44 9, adult (HCC)  Assessment & Plan  bmi 40 17  Will need diet exercise and lifestyle modification counseling    Tobacco abuse  Assessment & Plan  counselled on smoking cessation and placed on nicotine replacement therapy as needed    Essential hypertension  Assessment & Plan  Olmesartan while receiving diuresis    Blood pressure currently normal      VTE Prophylaxis: Enoxaparin (Lovenox)  / sequential compression device   Code Status: full code  POLST: There is no POLST form on file for this patient (pre-hospital)  Discussion with family: discussed with wife at bedside    Anticipated Length of Stay:  Patient will be admitted on an Inpatient basis with an anticipated length of stay of  More than 2 midnights  Justification for Hospital Stay: acute on Chronic diastolic CHF exacerbation    Total Time for Visit, including Counseling / Coordination of Care: 60 minutes  Greater than 50% of this total time spent on direct patient counseling and coordination of care  Chief Complaint:  shortness of breath    History of Present Illness:    Kassi Ramirez is a 75 y o  male who presents with shortness of breath that started 2 to 3 days ago  He states that he has a 5 pound weight gain and also having cough congestion and he feels he has a pneumonia and came to the ED to be evaluated normally uses 3 L of oxygen at home  Now feeling better after getting a breathing treatment and Lasix  Requesting IV antibiotics as that helps him get better faster    Review of Systems:    Review of Systems   Constitutional: Positive for fatigue  Negative for appetite change, chills and fever  HENT: Negative for hearing loss, sore throat and trouble swallowing  Eyes: Negative for photophobia, discharge and visual disturbance  Respiratory: Positive for cough and shortness of breath  Negative for chest tightness  Cardiovascular: Positive for leg swelling  Negative for chest pain and palpitations  Gastrointestinal: Negative for abdominal pain, blood in stool and vomiting  Endocrine: Negative for polydipsia and polyuria  Genitourinary: Negative for difficulty urinating, dysuria, flank pain and hematuria  Musculoskeletal: Negative for back pain and gait problem  Skin: Negative for rash  Allergic/Immunologic: Negative for environmental allergies and food allergies  Neurological: Negative for dizziness, seizures, syncope and headaches  Hematological: Does not bruise/bleed easily  Psychiatric/Behavioral: Negative for behavioral problems  All other systems reviewed and are negative  Past Medical and Surgical History:     Past Medical History:   Diagnosis Date   • COPD (chronic obstructive pulmonary disease) ([facility] [address] )    • HTN (hypertension)    • Hyperlipidemia    • Obesity    • Polycythemia        Past Surgical History:   Procedure Laterality Date   • ARTHROSCOPY KNEE Left 2002   • COLONOSCOPY         Meds/Allergies:    Prior to Admission medications    Medication Sig Start Date End Date Taking?  Authorizing Provider   albuterol (PROVENTIL HFA,VENTOLIN HFA) 90 mcg/act inhaler Inhale 2 puffs 4 (four) times a day 6/3/22  Yes Anette Allen MD   aspirin 81 mg chewable tablet Chew 162 mg daily   Yes Historical Provider, MD   B Complex CAPS Take 1 capsule by mouth daily   Yes Historical Provider, MD   cyanocobalamin (VITAMIN B-12) 500 MCG tablet Take 500 mcg by mouth   Yes Historical Provider, MD   finasteride (PROSCAR) 5 mg tablet Take 1 tablet (5 mg total) by mouth daily 8/1/22  Yes AMALIA Harkins   fluticasone (Flovent HFA) 220 mcg/act inhaler Inhale 2 puffs 2 (two) times a day 3/15/22  Yes Historical Provider, MD   furosemide (LASIX) 40 mg tablet Take 1 5 tablets (60 mg total) by mouth daily  Patient taking differently: Take 40 mg by mouth daily One daily 6/4/22 1/2/23 Yes Anette Allen MD   ketoconazole (NIZORAL) 2 % shampoo  6/7/22  Yes Historical Provider, MD   Multiple Vitamin (Multi Vitamin Daily) TABS Take 1 tablet by mouth daily   Yes Historical Provider, MD   olmesartan (BENICAR) 5 mg tablet Take 1 tablet by mouth daily 12/29/21  Yes Historical Provider, MD   Omega-3 Fatty Acids (fish oil) 1,000 mg Take 1,000 mg by mouth   Yes Historical Provider, MD   Probiotic Product (Misc Intestinal Kajal Regulat) CAPS Take 1 capsule by mouth   Yes Historical Provider, MD   selenium 50 MCG TABS Take 200 mcg by mouth daily   Yes Historical Provider, MD   vitamin E, tocopherol, 400 units capsule Take 400 Units by mouth   Yes Historical Provider, MD nicotine (NICODERM CQ) 21 mg/24 hr TD 24 hr patch Place 1 patch on the skin daily  Patient not taking: Reported on 2022   Trudi Lambert MD   ibuprofen (MOTRIN) 400 mg tablet Take 1 tablet (400 mg total) by mouth 3 (three) times a day for 5 days 6/3/22 1/2/23  Trudi Lambert MD     I have reviewed home medications with patient personally  Allergies: Allergies   Allergen Reactions   • Other Anaphylaxis     bees   • Lansoprazole Rash     Rash      • Clonidine Other (See Comments)   • Codeine Nausea Only and Vomiting     n/v  pill form only; cough medicine okay     • Levaquin [Levofloxacin] Hives   • Lisinopril Cough     cough     • Oxycodone-Acetaminophen Headache     headache   • Prednisone Other (See Comments)     unfriendly   • Statins Myalgia and Other (See Comments)     Elevated bp  elevates bp     • Sulfa Antibiotics Other (See Comments)     ? • Ampicillin Rash     Rash     • Azithromycin Hives and Rash   • Ranitidine Rash     Rash forehead, pepcid         Social History:     Marital Status: /Civil Union     Social History     Substance and Sexual Activity   Alcohol Use Not Currently     Social History     Tobacco Use   Smoking Status Former   • Packs/day: 1 00   • Types: Cigarettes   • Quit date: 2022   • Years since quittin 6   Smokeless Tobacco Never     Social History     Substance and Sexual Activity   Drug Use Not Currently       Family History:    Family History   Problem Relation Age of Onset   • Heart attack Mother    • Heart disease Brother        Physical Exam:     Vitals:   Blood Pressure: 111/74 (23 1726)  Pulse: 94 (23 172)  Temperature: 97 6 °F (36 4 °C) (23 172)  Temp Source: Oral (23 172)  Respirations: 20 (23 172)  SpO2: 96 % (23 172)    Physical Exam  Vitals and nursing note reviewed  Constitutional:       Appearance: He is obese  He is ill-appearing  HENT:      Head: Normocephalic and atraumatic        Right Ear: External ear normal       Left Ear: External ear normal       Nose: Nose normal       Mouth/Throat:      Pharynx: Oropharynx is clear  Eyes:      Pupils: Pupils are equal, round, and reactive to light  Cardiovascular:      Rate and Rhythm: Normal rate and regular rhythm  Heart sounds: Normal heart sounds  Pulmonary:      Effort: Pulmonary effort is normal       Breath sounds: Wheezing present  Comments: mod air entry bilaterally with diminished breath sounds on the basis  Abdominal:      General: Bowel sounds are normal       Palpations: Abdomen is soft  Tenderness: There is no abdominal tenderness  Musculoskeletal:         General: Normal range of motion  Cervical back: Normal range of motion and neck supple  Right lower leg: Edema present  Left lower leg: Edema present  Skin:     General: Skin is warm and dry  Capillary Refill: Capillary refill takes less than 2 seconds  Neurological:      General: No focal deficit present  Mental Status: He is alert and oriented to person, place, and time  Psychiatric:         Mood and Affect: Mood normal              Additional Data:     Lab Results: I have personally reviewed pertinent reports        Results from last 7 days   Lab Units 01/02/23  1227   WBC Thousand/uL 8 30   HEMOGLOBIN g/dL 15 0   HEMATOCRIT % 46 9   PLATELETS Thousands/uL 248   NEUTROS PCT % 48   LYMPHS PCT % 25   MONOS PCT % 19*   EOS PCT % 7*     Results from last 7 days   Lab Units 01/02/23  1227   SODIUM mmol/L 142   POTASSIUM mmol/L 4 2   CHLORIDE mmol/L 99   CO2 mmol/L 40*   BUN mg/dL 15   CREATININE mg/dL 0 76   ANION GAP mmol/L 3*   CALCIUM mg/dL 9 5   ALBUMIN g/dL 3 6   TOTAL BILIRUBIN mg/dL 0 39   ALK PHOS U/L 63   ALT U/L 40   AST U/L 38   GLUCOSE RANDOM mg/dL 103     Results from last 7 days   Lab Units 01/02/23  1227   INR  0 98             Results from last 7 days   Lab Units 01/02/23  1227   LACTIC ACID mmol/L 1 0   PROCALCITONIN ng/ml <0 05 Imaging: I have personally reviewed pertinent reports  XR chest 1 view portable   ED Interpretation by Alisa Acosta MD (01/02 1229)   CHF      Final Result by Albertina Albarado MD (01/02 1629)      No acute cardiopulmonary disease  Workstation performed: [id number]         PE Study with CT Abdomen and Pelvis with contrast    (Results Pending)       EKG, Pathology, and Other Studies Reviewed on Admission:   · EKG: sinus rhythm    Allscripts / Epic Records Reviewed: Yes     ** Please Note: This note has been constructed using a voice recognition system   **

## 2023-01-02 NOTE — TELEPHONE ENCOUNTER
Patient unable and refusing to lay flat for scan  Scan on hold until patient able to tolerate  ED RN aware  1512hrs

## 2023-01-03 ENCOUNTER — APPOINTMENT (INPATIENT)
Dept: NON INVASIVE DIAGNOSTICS | Facility: HOSPITAL | Age: 68
End: 2023-01-03

## 2023-01-03 LAB
ANION GAP SERPL CALCULATED.3IONS-SCNC: 2 MMOL/L (ref 4–13)
AORTIC ROOT: 3.3 CM
AORTIC VALVE MEAN VELOCITY: 6.2 M/S
APICAL FOUR CHAMBER EJECTION FRACTION: 52 %
ASCENDING AORTA: 3.4 CM
AV LVOT MEAN GRADIENT: 1 MMHG
AV LVOT PEAK GRADIENT: 3 MMHG
AV MEAN GRADIENT: 2 MMHG
AV PEAK GRADIENT: 4 MMHG
AV VELOCITY RATIO: 0.85
BUN SERPL-MCNC: 15 MG/DL (ref 5–25)
CALCIUM SERPL-MCNC: 9.2 MG/DL (ref 8.3–10.1)
CHLORIDE SERPL-SCNC: 97 MMOL/L (ref 96–108)
CO2 SERPL-SCNC: 38 MMOL/L (ref 21–32)
CREAT SERPL-MCNC: 0.8 MG/DL (ref 0.6–1.3)
DOP CALC AO PEAK VEL: 1.01 M/S
DOP CALC AO VTI: 24.55 CM
DOP CALC LVOT PEAK VEL VTI: 22.08 CM
DOP CALC LVOT PEAK VEL: 0.86 M/S
E WAVE DECELERATION TIME: 126 MS
ERYTHROCYTE [DISTWIDTH] IN BLOOD BY AUTOMATED COUNT: 14.1 % (ref 11.6–15.1)
FRACTIONAL SHORTENING: 50 (ref 28–44)
GFR SERPL CREATININE-BSD FRML MDRD: 91 ML/MIN/1.73SQ M
GLUCOSE SERPL-MCNC: 97 MG/DL (ref 65–140)
HCT VFR BLD AUTO: 46 % (ref 36.5–49.3)
HGB BLD-MCNC: 14.5 G/DL (ref 12–17)
INTERVENTRICULAR SEPTUM IN DIASTOLE (PARASTERNAL SHORT AXIS VIEW): 1.3 CM
INTERVENTRICULAR SEPTUM: 1.3 CM (ref 0.6–1.1)
LAAS-AP4: 24.3 CM2
LEFT ATRIUM SIZE: 4.6 CM
LEFT INTERNAL DIMENSION IN SYSTOLE: 2.2 CM (ref 2.1–4)
LEFT VENTRICLE DIASTOLIC VOLUME (MOD BIPLANE): 81 ML
LEFT VENTRICLE SYSTOLIC VOLUME (MOD BIPLANE): 39 ML
LEFT VENTRICULAR INTERNAL DIMENSION IN DIASTOLE: 4.4 CM (ref 3.5–6)
LEFT VENTRICULAR POSTERIOR WALL IN END DIASTOLE: 1.4 CM
LEFT VENTRICULAR STROKE VOLUME: 72 ML
LV EF: 52 %
LVSV (TEICH): 72 ML
MCH RBC QN AUTO: 30.1 PG (ref 26.8–34.3)
MCHC RBC AUTO-ENTMCNC: 31.5 G/DL (ref 31.4–37.4)
MCV RBC AUTO: 95 FL (ref 82–98)
MV E'TISSUE VEL-LAT: 9 CM/S
MV E'TISSUE VEL-SEP: 9 CM/S
MV PEAK A VEL: 0.8 M/S
MV PEAK E VEL: 63 CM/S
MV STENOSIS PRESSURE HALF TIME: 37 MS
MV VALVE AREA P 1/2 METHOD: 5.95
PLATELET # BLD AUTO: 256 THOUSANDS/UL (ref 149–390)
PMV BLD AUTO: 9.4 FL (ref 8.9–12.7)
POTASSIUM SERPL-SCNC: 3.5 MMOL/L (ref 3.5–5.3)
PV PEAK GRADIENT: 4 MMHG
RBC # BLD AUTO: 4.82 MILLION/UL (ref 3.88–5.62)
RIGHT ATRIUM AREA SYSTOLE A4C: 20.3 CM2
RIGHT VENTRICLE ID DIMENSION: 4 CM
SL CV LV EF: 65
SL CV PED ECHO LEFT VENTRICLE DIASTOLIC VOLUME (MOD BIPLANE) 2D: 88 ML
SL CV PED ECHO LEFT VENTRICLE SYSTOLIC VOLUME (MOD BIPLANE) 2D: 16 ML
SODIUM SERPL-SCNC: 137 MMOL/L (ref 135–147)
TR MAX PG: 15 MMHG
TR PEAK VELOCITY: 1.9 M/S
TRICUSPID VALVE PEAK REGURGITATION VELOCITY: 1.92 M/S
TSH SERPL DL<=0.05 MIU/L-ACNC: 5.67 UIU/ML (ref 0.45–4.5)
WBC # BLD AUTO: 10.17 THOUSAND/UL (ref 4.31–10.16)

## 2023-01-03 RX ORDER — GUAIFENESIN 600 MG/1
600 TABLET, EXTENDED RELEASE ORAL EVERY 12 HOURS SCHEDULED
Status: DISCONTINUED | OUTPATIENT
Start: 2023-01-03 | End: 2023-01-05 | Stop reason: HOSPADM

## 2023-01-03 RX ORDER — KETOROLAC TROMETHAMINE 10 MG/1
10 TABLET, FILM COATED ORAL ONCE
Status: COMPLETED | OUTPATIENT
Start: 2023-01-03 | End: 2023-01-03

## 2023-01-03 RX ADMIN — NICOTINE 14 MG: 14 PATCH, EXTENDED RELEASE TRANSDERMAL at 00:09

## 2023-01-03 RX ADMIN — ACETAMINOPHEN 650 MG: 325 TABLET ORAL at 11:39

## 2023-01-03 RX ADMIN — IPRATROPIUM BROMIDE AND ALBUTEROL SULFATE 3 ML: 2.5; .5 SOLUTION RESPIRATORY (INHALATION) at 02:22

## 2023-01-03 RX ADMIN — FUROSEMIDE 40 MG: 10 INJECTION, SOLUTION INTRAMUSCULAR; INTRAVENOUS at 08:59

## 2023-01-03 RX ADMIN — FINASTERIDE 5 MG: 5 TABLET, FILM COATED ORAL at 08:59

## 2023-01-03 RX ADMIN — CYANOCOBALAMIN TAB 500 MCG 500 MCG: 500 TAB at 08:59

## 2023-01-03 RX ADMIN — FLUTICASONE PROPIONATE 2 PUFF: 220 AEROSOL, METERED RESPIRATORY (INHALATION) at 17:20

## 2023-01-03 RX ADMIN — FUROSEMIDE 40 MG: 10 INJECTION, SOLUTION INTRAMUSCULAR; INTRAVENOUS at 17:20

## 2023-01-03 RX ADMIN — IPRATROPIUM BROMIDE AND ALBUTEROL SULFATE 3 ML: 2.5; .5 SOLUTION RESPIRATORY (INHALATION) at 13:05

## 2023-01-03 RX ADMIN — KETOROLAC TROMETHAMINE 10 MG: 10 TABLET, FILM COATED ORAL at 20:02

## 2023-01-03 RX ADMIN — NICOTINE 14 MG: 14 PATCH, EXTENDED RELEASE TRANSDERMAL at 09:02

## 2023-01-03 RX ADMIN — PERFLUTREN 2 ML/MIN: 6.52 INJECTION, SUSPENSION INTRAVENOUS at 10:53

## 2023-01-03 RX ADMIN — CEFTRIAXONE 1000 MG: 1 INJECTION, SOLUTION INTRAVENOUS at 20:59

## 2023-01-03 RX ADMIN — IPRATROPIUM BROMIDE AND ALBUTEROL SULFATE 3 ML: 2.5; .5 SOLUTION RESPIRATORY (INHALATION) at 07:42

## 2023-01-03 RX ADMIN — ACETAMINOPHEN 650 MG: 325 TABLET ORAL at 04:56

## 2023-01-03 RX ADMIN — NICOTINE POLACRILEX 2 MG: 2 GUM, CHEWING BUCCAL at 09:06

## 2023-01-03 RX ADMIN — FLUTICASONE PROPIONATE 2 PUFF: 220 AEROSOL, METERED RESPIRATORY (INHALATION) at 09:06

## 2023-01-03 RX ADMIN — ASPIRIN 81 MG CHEWABLE TABLET 162 MG: 81 TABLET CHEWABLE at 08:59

## 2023-01-03 RX ADMIN — Medication 1 TABLET: at 09:02

## 2023-01-03 RX ADMIN — IPRATROPIUM BROMIDE AND ALBUTEROL SULFATE 3 ML: 2.5; .5 SOLUTION RESPIRATORY (INHALATION) at 20:34

## 2023-01-03 NOTE — PLAN OF CARE
Problem: MOBILITY - ADULT  Goal: Maintains/Returns to pre admission functional level  Description: INTERVENTIONS:  - Perform BMAT or MOVE assessment daily    - Set and communicate daily mobility goal to care team and patient/family/caregiver     - Out of bed for meals 3 times a day  - Out of bed for toileting  - Record patient progress and toleration of activity level   Outcome: Progressing     Problem: PAIN - ADULT  Goal: Verbalizes/displays adequate comfort level or baseline comfort level  Description: Interventions:  - Encourage patient to monitor pain and request assistance  - Assess pain using appropriate pain scale  - Administer analgesics based on type and severity of pain and evaluate response  - Implement non-pharmacological measures as appropriate and evaluate response  - Consider cultural and social influences on pain and pain management  - Notify physician/advanced practitioner if interventions unsuccessful or patient reports new pain  Outcome: Progressing     Problem: INFECTION - ADULT  Goal: Absence or prevention of progression during hospitalization  Description: INTERVENTIONS:  - Assess and monitor for signs and symptoms of infection  - Monitor lab/diagnostic results  - Monitor all insertion sites, i e  indwelling lines, tubes, and drains  - Monitor endotracheal if appropriate and nasal secretions for changes in amount and color  - [place] appropriate cooling/warming therapies per order  - Administer medications as ordered  - Instruct and encourage patient and family to use good hand hygiene technique  - Identify and instruct in appropriate isolation precautions for identified infection/condition  Outcome: Progressing  Goal: Absence of fever/infection during neutropenic period  Description: INTERVENTIONS:  - Monitor WBC    Outcome: Progressing     Problem: DISCHARGE PLANNING  Goal: Discharge to home or other facility with appropriate resources  Description: INTERVENTIONS:  - Identify barriers to discharge w/patient and caregiver  - Arrange for needed discharge resources and transportation as appropriate  - Identify discharge learning needs (meds, wound care, etc )  - Arrange for interpretive services to assist at discharge as needed  - Refer to Case Management Department for coordinating discharge planning if the patient needs post-hospital services based on physician/advanced practitioner order or complex needs related to functional status, cognitive ability, or social support system  Outcome: Progressing     Problem: Knowledge Deficit  Goal: Patient/family/caregiver demonstrates understanding of disease process, treatment plan, medications, and discharge instructions  Description: Complete learning assessment and assess knowledge base    Interventions:  - Provide teaching at level of understanding  - Provide teaching via preferred learning methods  Outcome: Progressing     Problem: RESPIRATORY - ADULT  Goal: Achieves optimal ventilation and oxygenation  Description: INTERVENTIONS:  - Assess for changes in respiratory status  - Assess for changes in mentation and behavior  - Position to facilitate oxygenation and minimize respiratory effort  - Oxygen administered by appropriate delivery if ordered  - Initiate smoking cessation education as indicated  - Encourage broncho-pulmonary hygiene including cough, deep breathe, Incentive Spirometry  - Assess the need for suctioning and aspirate as needed  - Assess and instruct to report SOB or any respiratory difficulty  - Respiratory Therapy support as indicated  Outcome: Progressing     Problem: CARDIOVASCULAR - ADULT  Goal: Maintains optimal cardiac output and hemodynamic stability  Description: INTERVENTIONS:  - Monitor I/O, vital signs and rhythm  - Monitor for S/S and trends of decreased cardiac output  - Administer and titrate ordered vasoactive medications to optimize hemodynamic stability  - Assess quality of pulses, skin color and temperature  - Assess for signs of decreased coronary artery perfusion  - Instruct patient to report change in severity of symptoms  Outcome: Progressing  Goal: Absence of cardiac dysrhythmias or at baseline rhythm  Description: INTERVENTIONS:  - Continuous cardiac monitoring, vital signs, obtain 12 lead EKG if ordered  - Administer antiarrhythmic and heart rate control medications as ordered  - Monitor electrolytes and administer replacement therapy as ordered  Outcome: Progressing

## 2023-01-03 NOTE — ASSESSMENT & PLAN NOTE
patient states that he uses 3 liters of oxygen at baseline but for the last 2 days he has been feeling worsening shortness of breath wheezing and he feels he has a pneumonia  Noted To have some COPD exacerbation  Chest x-ray does not show a pneumonia at this time however will treat with antibiotics due to possibility of bronchitis  placed on IV Rocephin as patient is allergic to Zithromax  cont oxygen and mucinex  Procalcitonin is negative however patient is adamant on continuing antibiotics  refusing CT chest as he cannot lay flat

## 2023-01-03 NOTE — PLAN OF CARE
Problem: RESPIRATORY - ADULT  Goal: Achieves optimal ventilation and oxygenation  Description: INTERVENTIONS:  - Assess for changes in respiratory status         confusion ANTON, SOB, hypoxia  - Assess for changes in mentation and behavior  - Position to facilitate oxygenation and minimize respiratory effort  - Oxygen administered by appropriate delivery if ordered  - Initiate smoking cessation education as indicated  - Encourage broncho-pulmonary hygiene including cough, deep breathe, Incentive Spirometry  - Assess the need for suctioning and aspirate as needed  - Assess and instruct to report SOB or any respiratory difficulty  - Respiratory Therapy support as indicated  1/3/2023 0202 by Mckenzie Otoole RN  Outcome: Progressing  1/3/2023 0202 by Mckenzie Otoole RN  Outcome: Progressing

## 2023-01-03 NOTE — ASSESSMENT & PLAN NOTE
Wt Readings from Last 3 Encounters:   01/03/23 132 kg (290 lb)   08/01/22 131 kg (288 lb)   06/03/22 133 kg (293 lb 3 2 oz)     States that he has gained 5 pounds in the last few days  States he takes Lasix 40 mg daily and sometimes takes an additional 20 mg in the afternoon if he feels he is getting swollen once or twice a month  Placed on Lasix 40 mg IV twice daily and observe response  Monitor renal function closely  We will repeat 2D echo to evaluate LV function    Placed on low-salt diet and restriction of 1500 mL

## 2023-01-03 NOTE — PROGRESS NOTES
[address]  Progress Note Vivien Ramirez 1955, 75 y o  male MRN: [id number]  Unit/Bed#: -01 Encounter: [id number]  Primary Care Provider: Brennan Milian DO   Date and time admitted to hospital: 1/2/2023 11:58 AM    * Acute on chronic diastolic (congestive) heart failure ([facility] )  Assessment & Plan  Wt Readings from Last 3 Encounters:   01/03/23 132 kg (290 lb)   08/01/22 131 kg (288 lb)   06/03/22 133 kg (293 lb 3 2 oz)     States that he has gained 5 pounds in the last few days  States he takes Lasix 40 mg daily and sometimes takes an additional 20 mg in the afternoon if he feels he is getting swollen once or twice a month  Placed on Lasix 40 mg IV twice daily and observe response  Monitor renal function closely  We will repeat 2D echo to evaluate LV function  Placed on low-salt diet and restriction of 1500 mL        COPD, severe ([facility] )  Assessment & Plan  patient states that he uses 3 liters of oxygen at baseline but for the last 2 days he has been feeling worsening shortness of breath wheezing and he feels he has a pneumonia  Noted To have some COPD exacerbation  Chest x-ray does not show a pneumonia at this time however will treat with antibiotics due to possibility of bronchitis  placed on IV Rocephin as patient is allergic to Zithromax  cont oxygen and mucinex  Procalcitonin is negative however patient is adamant on continuing antibiotics  refusing CT chest as he cannot lay flat    Chronic respiratory failure with hypoxia [facility])  Assessment & Plan  Due to underlying COPD and uses 3 L of oxygen at home  Does not use any CPAP or BiPAP machine  Elevated lipase  Assessment & Plan  currently asymptomatic  Monitor for now    Morbid obesity with BMI of 40 0-44 9, adult (HCC)  Assessment & Plan  bmi 40 17    Will need diet exercise and lifestyle modification counseling    Tobacco abuse  Assessment & Plan  counselled on smoking cessation and placed on nicotine replacement therapy as needed    Essential hypertension  Assessment & Plan  Olmesartan while receiving diuresis  Blood pressure currently normal    VTE Pharmacologic Prophylaxis:   Pharmacologic: Enoxaparin (Lovenox)  Mechanical VTE Prophylaxis in Place: Yes    Patient Centered Rounds: I have performed bedside rounds with nursing staff today  Discussions with Specialists or Other Care Team Provider: none    Education and Discussions with Family / Patient: discussed with patient at bedside    Time Spent for Care: 30 minutes  More than 50% of total time spent on counseling and coordination of care as described above  Current Length of Stay: 1 day(s)    Current Patient Status: Inpatient   Certification Statement: The patient will continue to require additional inpatient hospital stay due to HF exacerbation and COPD exacerbation    Discharge Plan: To be discharged in 24 to 48 hours    Code Status: Level 1 - Full Code      Subjective:   Patient states he feels no better yet  Needed 4 L of oxygen this morning which I have now weaned back down to 3 L  Also complaining of a mild headache    Objective:     Vitals:   Temp (24hrs), Av °F (36 7 °C), Min:97 6 °F (36 4 °C), Max:98 5 °F (36 9 °C)    Temp:  [97 6 °F (36 4 °C)-98 5 °F (36 9 °C)] 97 8 °F (36 6 °C)  HR:  [] 100  Resp:  [18-21] 21  BP: (111-146)/(61-74) 117/61  SpO2:  [93 %-99 %] 99 %  Body mass index is 40 45 kg/m²  Input and Output Summary (last 24 hours): Intake/Output Summary (Last 24 hours) at 1/3/2023 1204  Last data filed at 1/3/2023 1038  Gross per 24 hour   Intake 540 ml   Output 2125 ml   Net -1585 ml       Physical Exam:     Physical Exam  Vitals and nursing note reviewed  Constitutional:       Appearance: Normal appearance  He is obese  HENT:      Head: Normocephalic and atraumatic        Right Ear: External ear normal       Left Ear: External ear normal       Nose: Nose normal       Mouth/Throat:      Pharynx: Oropharynx is clear    Cardiovascular:      Rate and Rhythm: Normal rate and regular rhythm  Heart sounds: Normal heart sounds  Pulmonary:      Effort: Pulmonary effort is normal       Comments: mod air entry bilaterally with diminished breath sounds bilateral bases  Abdominal:      General: Bowel sounds are normal       Palpations: Abdomen is soft  Tenderness: There is no abdominal tenderness  Musculoskeletal:         General: Normal range of motion  Cervical back: Normal range of motion and neck supple  Comments: Trace Edema bilateral lower extremity   Skin:     General: Skin is warm and dry  Capillary Refill: Capillary refill takes less than 2 seconds  Neurological:      General: No focal deficit present  Mental Status: He is alert and oriented to person, place, and time  Psychiatric:         Mood and Affect: Mood normal            Additional Data:     Labs:    Results from last 7 days   Lab Units 01/03/23  0438 01/02/23  1227   WBC Thousand/uL 10 17* 8 30   HEMOGLOBIN g/dL 14 5 15 0   HEMATOCRIT % 46 0 46 9   PLATELETS Thousands/uL 256 248   NEUTROS PCT %  --  48   LYMPHS PCT %  --  25   MONOS PCT %  --  19*   EOS PCT %  --  7*     Results from last 7 days   Lab Units 01/03/23  0438 01/02/23  1227   SODIUM mmol/L 137 142   POTASSIUM mmol/L 3 5 4 2   CHLORIDE mmol/L 97 99   CO2 mmol/L 38* 40*   BUN mg/dL 15 15   CREATININE mg/dL 0 80 0 76   ANION GAP mmol/L 2* 3*   CALCIUM mg/dL 9 2 9 5   ALBUMIN g/dL  --  3 6   TOTAL BILIRUBIN mg/dL  --  0 39   ALK PHOS U/L  --  63   ALT U/L  --  40   AST U/L  --  38   GLUCOSE RANDOM mg/dL 97 103     Results from last 7 days   Lab Units 01/02/23  1227   INR  0 98             Results from last 7 days   Lab Units 01/02/23  1227   LACTIC ACID mmol/L 1 0   PROCALCITONIN ng/ml <0 05           * I Have Reviewed All Lab Data Listed Above  * Additional Pertinent Lab Tests Reviewed:  Twila 66 Admission Reviewed    Imaging:    Imaging Reports Reviewed Today Include: cxray  Imaging Personally Reviewed by Myself Includes:  cxray    Recent Cultures (last 7 days):     Results from last 7 days   Lab Units 01/02/23  1234 01/02/23  1227   BLOOD CULTURE  Received in Microbiology Lab  Culture in Progress  Received in Microbiology Lab  Culture in Progress  Last 24 Hours Medication List:   Current Facility-Administered Medications   Medication Dose Route Frequency Provider Last Rate   • acetaminophen  650 mg Oral Q6H PRN Raven Given, CRNP     • aspirin  162 mg Oral Daily Raven Given, CRNP     • calcium carbonate  1,000 mg Oral Daily PRN Raven Given, CRNP     • cefTRIAXone  1,000 mg Intravenous Q24H Raven Given, CRNP 1,000 mg (01/02/23 2124)   • cyanocobalamin  500 mcg Oral Daily Raven Given, CRNP     • enoxaparin  40 mg Subcutaneous Q24H [facility], CRNP     • finasteride  5 mg Oral Daily Raven Given, CRNP     • fluticasone  2 puff Inhalation BID Raven Given, CRNP     • furosemide  40 mg Intravenous BID (diuretic) Raven Given, CRNP     • guaiFENesin  600 mg Oral Q12H [address] Marii Alfredo MD     • ipratropium-albuterol  3 mL Nebulization Q6H Raven Given, CRNP     • multivitamin stress formula  1 tablet Oral Daily Raven Given, CRNP     • nicotine  14 mg Transdermal Daily Raven Given, CRNP     • nicotine polacrilex  2 mg Oral Q2H PRN Raven Given, CRNP     • ondansetron  4 mg Intravenous Q6H PRN Raven Given, CRNP          Today, Patient Was Seen By: Marii Alfredo MD    ** Please Note: Dictation voice to text software may have been used in the creation of this document   **

## 2023-01-03 NOTE — PLAN OF CARE
Problem: MOBILITY - ADULT  Goal: Maintain or return to baseline ADL function  Description: INTERVENTIONS:  -  Assess patient's ability to carry out ADLs; assess patient's baseline for ADL function and identify physical deficits which impact ability to perform ADLs (bathing, care of mouth/teeth, toileting, grooming, dressing, etc )  - Assess/evaluate cause of self-care deficits   - Assess range of motion  - Assess patient's mobility; develop plan if impaired  - Assess patient's need for assistive devices and provide as appropriate  - Encourage maximum independence but intervene and supervise when necessary  - Involve family in performance of ADLs  - Assess for home care needs following discharge   - Consider OT consult to assist with ADL evaluation and planning for discharge  - Provide patient education as appropriate  Outcome: Progressing  Goal: Maintains/Returns to pre admission functional level  Description: INTERVENTIONS:  - Perform BMAT or MOVE assessment daily    - Set and communicate daily mobility goal to care team and patient/family/caregiver  - Collaborate with rehabilitation services on mobility goals if consulted  - Perform Range of Motion 4 times a day  - Reposition patient every 2 hours    - Dangle patient 3 times a day  - Stand patient 3 times a day  - Ambulate patient 3 times a day  - Out of bed to chair 3 times a day   - Out of bed for meals 3 times a day  - Out of bed for toileting  - Record patient progress and toleration of activity level   Outcome: Progressing     Problem: PAIN - ADULT  Goal: Verbalizes/displays adequate comfort level or baseline comfort level  Description: Interventions:  - Encourage patient to monitor pain and request assistance  - Assess pain using appropriate pain scale  - Administer analgesics based on type and severity of pain and evaluate response  - Implement non-pharmacological measures as appropriate and evaluate response  - Consider cultural and social influences on pain and pain management  - Notify physician/advanced practitioner if interventions unsuccessful or patient reports new pain  Outcome: Progressing     Problem: INFECTION - ADULT  Goal: Absence or prevention of progression during hospitalization  Description: INTERVENTIONS:  - Assess and monitor for signs and symptoms of infection  - Monitor lab/diagnostic results  - Monitor all insertion sites, i e  indwelling lines, tubes, and drains  - Monitor endotracheal if appropriate and nasal secretions for changes in amount and color  - [place] appropriate cooling/warming therapies per order  - Administer medications as ordered  - Instruct and encourage patient and family to use good hand hygiene technique  - Identify and instruct in appropriate isolation precautions for identified infection/condition  Outcome: Progressing  Goal: Absence of fever/infection during neutropenic period  Description: INTERVENTIONS:  - Monitor WBC    Outcome: Progressing     Problem: DISCHARGE PLANNING  Goal: Discharge to home or other facility with appropriate resources  Description: INTERVENTIONS:  - Identify barriers to discharge w/patient and caregiver  - Arrange for needed discharge resources and transportation as appropriate  - Identify discharge learning needs (meds, wound care, etc )  - Arrange for interpretive services to assist at discharge as needed  - Refer to Case Management Department for coordinating discharge planning if the patient needs post-hospital services based on physician/advanced practitioner order or complex needs related to functional status, cognitive ability, or social support system  Outcome: Progressing     Problem: Knowledge Deficit  Goal: Patient/family/caregiver demonstrates understanding of disease process, treatment plan, medications, and discharge instructions  Description: Complete learning assessment and assess knowledge base    Interventions:  - Provide teaching at level of understanding  - Provide teaching via preferred learning methods  Outcome: Progressing     Problem: RESPIRATORY - ADULT  Goal: Achieves optimal ventilation and oxygenation  Description: INTERVENTIONS:  - Assess for changes in respiratory status         confusion ANTON, SOB, hypoxia  - Assess for changes in mentation and behavior  - Position to facilitate oxygenation and minimize respiratory effort  - Oxygen administered by appropriate delivery if ordered  - Initiate smoking cessation education as indicated  - Encourage broncho-pulmonary hygiene including cough, deep breathe, Incentive Spirometry  - Assess the need for suctioning and aspirate as needed  - Assess and instruct to report SOB or any respiratory difficulty  - Respiratory Therapy support as indicated  Outcome: Progressing     Problem: CARDIOVASCULAR - ADULT  Goal: Maintains optimal cardiac output and hemodynamic stability  Description: INTERVENTIONS:  - Monitor I/O, vital signs and rhythm  - Monitor for S/S and trends of decreased cardiac output  - Administer and titrate ordered vasoactive medications to optimize hemodynamic stability  - Assess quality of pulses, skin color and temperature  - Assess for signs of decreased coronary artery perfusion  - Instruct patient to report change in severity of symptoms  Outcome: Progressing  Goal: Absence of cardiac dysrhythmias or at baseline rhythm  Description: INTERVENTIONS:  - Continuous cardiac monitoring, vital signs, obtain 12 lead EKG if ordered  - Administer antiarrhythmic and heart rate control medications as ordered  - Monitor electrolytes and administer replacement therapy as ordered  Outcome: Progressing

## 2023-01-04 LAB
ANION GAP SERPL CALCULATED.3IONS-SCNC: 3 MMOL/L (ref 4–13)
BUN SERPL-MCNC: 16 MG/DL (ref 5–25)
CALCIUM SERPL-MCNC: 9 MG/DL (ref 8.3–10.1)
CHLORIDE SERPL-SCNC: 98 MMOL/L (ref 96–108)
CO2 SERPL-SCNC: 38 MMOL/L (ref 21–32)
CREAT SERPL-MCNC: 0.71 MG/DL (ref 0.6–1.3)
D DIMER PPP FEU-MCNC: 0.51 UG/ML FEU
GFR SERPL CREATININE-BSD FRML MDRD: 96 ML/MIN/1.73SQ M
GLUCOSE SERPL-MCNC: 99 MG/DL (ref 65–140)
LIPASE SERPL-CCNC: 155 U/L (ref 73–393)
POTASSIUM SERPL-SCNC: 3.4 MMOL/L (ref 3.5–5.3)
SODIUM SERPL-SCNC: 139 MMOL/L (ref 135–147)
T4 FREE SERPL-MCNC: 0.96 NG/DL (ref 0.76–1.46)

## 2023-01-04 RX ORDER — POTASSIUM CHLORIDE 20 MEQ/1
40 TABLET, EXTENDED RELEASE ORAL DAILY
Status: DISCONTINUED | OUTPATIENT
Start: 2023-01-04 | End: 2023-01-05 | Stop reason: HOSPADM

## 2023-01-04 RX ORDER — LIDOCAINE 50 MG/G
1 PATCH TOPICAL DAILY
Status: DISCONTINUED | OUTPATIENT
Start: 2023-01-04 | End: 2023-01-05 | Stop reason: HOSPADM

## 2023-01-04 RX ORDER — ECHINACEA PURPUREA EXTRACT 125 MG
1 TABLET ORAL
Status: DISCONTINUED | OUTPATIENT
Start: 2023-01-04 | End: 2023-01-05 | Stop reason: HOSPADM

## 2023-01-04 RX ORDER — POLYETHYLENE GLYCOL 3350 17 G/17G
17 POWDER, FOR SOLUTION ORAL DAILY
Status: DISCONTINUED | OUTPATIENT
Start: 2023-01-04 | End: 2023-01-05 | Stop reason: HOSPADM

## 2023-01-04 RX ORDER — DOCUSATE SODIUM 100 MG/1
100 CAPSULE, LIQUID FILLED ORAL 2 TIMES DAILY
Status: DISCONTINUED | OUTPATIENT
Start: 2023-01-04 | End: 2023-01-05 | Stop reason: HOSPADM

## 2023-01-04 RX ORDER — KETOROLAC TROMETHAMINE 10 MG/1
10 TABLET, FILM COATED ORAL ONCE
Status: COMPLETED | OUTPATIENT
Start: 2023-01-04 | End: 2023-01-04

## 2023-01-04 RX ADMIN — IPRATROPIUM BROMIDE AND ALBUTEROL SULFATE 3 ML: 2.5; .5 SOLUTION RESPIRATORY (INHALATION) at 07:17

## 2023-01-04 RX ADMIN — SALINE NASAL SPRAY 1 SPRAY: 1.5 SOLUTION NASAL at 09:31

## 2023-01-04 RX ADMIN — IPRATROPIUM BROMIDE AND ALBUTEROL SULFATE 3 ML: 2.5; .5 SOLUTION RESPIRATORY (INHALATION) at 01:46

## 2023-01-04 RX ADMIN — IPRATROPIUM BROMIDE AND ALBUTEROL SULFATE 3 ML: 2.5; .5 SOLUTION RESPIRATORY (INHALATION) at 13:27

## 2023-01-04 RX ADMIN — CYANOCOBALAMIN TAB 500 MCG 500 MCG: 500 TAB at 09:22

## 2023-01-04 RX ADMIN — LIDOCAINE 1 PATCH: 50 PATCH TOPICAL at 09:47

## 2023-01-04 RX ADMIN — FLUTICASONE PROPIONATE 2 PUFF: 220 AEROSOL, METERED RESPIRATORY (INHALATION) at 17:45

## 2023-01-04 RX ADMIN — NICOTINE 14 MG: 14 PATCH, EXTENDED RELEASE TRANSDERMAL at 09:22

## 2023-01-04 RX ADMIN — GUAIFENESIN 600 MG: 600 TABLET ORAL at 09:22

## 2023-01-04 RX ADMIN — NICOTINE POLACRILEX 2 MG: 2 GUM, CHEWING BUCCAL at 23:40

## 2023-01-04 RX ADMIN — CEFTRIAXONE 1000 MG: 1 INJECTION, SOLUTION INTRAVENOUS at 21:19

## 2023-01-04 RX ADMIN — KETOROLAC TROMETHAMINE 10 MG: 10 TABLET, FILM COATED ORAL at 09:22

## 2023-01-04 RX ADMIN — ASPIRIN 81 MG CHEWABLE TABLET 162 MG: 81 TABLET CHEWABLE at 09:20

## 2023-01-04 RX ADMIN — POTASSIUM CHLORIDE 40 MEQ: 1500 TABLET, EXTENDED RELEASE ORAL at 16:01

## 2023-01-04 RX ADMIN — DOCUSATE SODIUM 100 MG: 100 CAPSULE ORAL at 17:44

## 2023-01-04 RX ADMIN — FINASTERIDE 5 MG: 5 TABLET, FILM COATED ORAL at 09:20

## 2023-01-04 RX ADMIN — FUROSEMIDE 40 MG: 10 INJECTION, SOLUTION INTRAMUSCULAR; INTRAVENOUS at 09:20

## 2023-01-04 RX ADMIN — Medication 1 TABLET: at 10:18

## 2023-01-04 RX ADMIN — IPRATROPIUM BROMIDE AND ALBUTEROL SULFATE 3 ML: 2.5; .5 SOLUTION RESPIRATORY (INHALATION) at 20:40

## 2023-01-04 RX ADMIN — FUROSEMIDE 40 MG: 10 INJECTION, SOLUTION INTRAMUSCULAR; INTRAVENOUS at 15:38

## 2023-01-04 RX ADMIN — FLUTICASONE PROPIONATE 2 PUFF: 220 AEROSOL, METERED RESPIRATORY (INHALATION) at 09:31

## 2023-01-04 NOTE — ASSESSMENT & PLAN NOTE
Wt Readings from Last 3 Encounters:   01/03/23 132 kg (290 lb)   08/01/22 131 kg (288 lb)   06/03/22 133 kg (293 lb 3 2 oz)     States that he has gained 5 pounds in the last few days  States he takes Lasix 40 mg daily and sometimes takes an additional 20 mg in the afternoon if he feels he is getting swollen once or twice a month  Placed on Lasix 40 mg IV twice daily and observe response  Monitor renal function closely  Placed on low-salt diet and restriction of 1500 mL  Echocardiogram:There is mild concentric hypertrophy  The left ventricular ejection fraction is 65%  Systolic function is normal   Grade 1 diastolic dysfunction  Continue IV diuretics, transition to p o  in a m

## 2023-01-04 NOTE — PROGRESS NOTES
[address]  Progress Note Kristina Finder James 1955, 75 y o  male MRN: [id number]  Unit/Bed#: -01 Encounter: [id number]  Primary Care Provider: Yamilka Cerna DO   Date and time admitted to hospital: 1/2/2023 11:58 AM    * Acute on chronic diastolic (congestive) heart failure ([facility] )  Assessment & Plan  Wt Readings from Last 3 Encounters:   01/03/23 132 kg (290 lb)   08/01/22 131 kg (288 lb)   06/03/22 133 kg (293 lb 3 2 oz)     States that he has gained 5 pounds in the last few days  States he takes Lasix 40 mg daily and sometimes takes an additional 20 mg in the afternoon if he feels he is getting swollen once or twice a month  Placed on Lasix 40 mg IV twice daily and observe response  Monitor renal function closely  Placed on low-salt diet and restriction of 1500 mL  Echocardiogram:There is mild concentric hypertrophy  The left ventricular ejection fraction is 65%  Systolic function is normal   Grade 1 diastolic dysfunction  Continue IV diuretics, transition to p o  in a m  Chronic respiratory failure with hypoxia [facility])  Assessment & Plan  Due to underlying COPD and uses 3 L of oxygen at home  Does not use any CPAP or BiPAP machine  Patient back to baseline  Check home O2 evaluation again    Elevated lipase  Assessment & Plan  Normalized    Morbid obesity with BMI of 40 0-44 9, adult (HCC)  Assessment & Plan  bmi 40 17  Will need diet exercise and lifestyle modification counseling    Tobacco abuse  Assessment & Plan  counselled on smoking cessation and placed on nicotine replacement therapy as needed    Essential hypertension  Assessment & Plan  Olmesartan while receiving diuresis  Blood pressure currently normal    COPD, severe ([facility] )  Assessment & Plan  patient states that he uses 3 liters of oxygen at baseline but for the last 2 days he has been feeling worsening shortness of breath wheezing and he feels he has a pneumonia    Noted To have some COPD exacerbation  Chest x-ray does not show a pneumonia at this time however will treat with antibiotics due to possibility of bronchitis  placed on IV Rocephin as patient is allergic to Zithromax  cont oxygen and mucinex  Procalcitonin is negative however patient is adamant on continuing antibiotics  refusing CT chest as he cannot lay flat-continue current treatment, back to baseline oxygen saturations  In fact as per patient oxygen saturation varies with ambulation, will do home O2 evaluation in a m  VTE Pharmacologic Prophylaxis: VTE Score: 2 Moderate Risk (Score 3-4) - Pharmacological DVT Prophylaxis Ordered: enoxaparin (Lovenox)  Patient Centered Rounds: I performed bedside rounds with nursing staff today  Discussions with Specialists or Other Care Team Provider: None    Education and Discussions with Family / Patient: Left voice message for wife  Time Spent for Care: 15 minutes  More than 50% of total time spent on counseling and coordination of care as described above  Current Length of Stay: 2 day(s)  Current Patient Status: Inpatient   Certification Statement: The patient will continue to require additional inpatient hospital stay due to To monitor her condition  Discharge Plan: Anticipate discharge in 24-48 hrs to home  Code Status: Level 1 - Full Code    Subjective:   Seen and evaluated during the rounds  Pending sitting upright position, using 2 L oxygen continuously  Patient reports whenever he tried to move around he feels short of breath and desaturated  Also reports still having some cough but improving  Objective:     Vitals:   Temp (24hrs), Av 1 °F (36 7 °C), Min:97 7 °F (36 5 °C), Max:98 6 °F (37 °C)    Temp:  [97 7 °F (36 5 °C)-98 6 °F (37 °C)] 97 8 °F (36 6 °C)  HR:  [86-91] 86  Resp:  [16-21] 21  BP: (111-131)/(64-72) 111/72  SpO2:  [93 %-99 %] 93 %  Body mass index is 40 45 kg/m²  Input and Output Summary (last 24 hours):      Intake/Output Summary (Last 24 hours) at 1/4/2023 1538  Last data filed at 1/4/2023 1420  Gross per 24 hour   Intake 1390 ml   Output 2475 ml   Net -1085 ml       Physical Exam:   Physical Exam  Vitals and nursing note reviewed  Exam conducted with a chaperone present  Constitutional:       Appearance: Normal appearance  He is obese  He is not ill-appearing or diaphoretic  HENT:      Mouth/Throat:      Mouth: Mucous membranes are moist       Pharynx: Oropharynx is clear  No oropharyngeal exudate  Eyes:      General: No scleral icterus  Left eye: No discharge  Extraocular Movements: Extraocular movements intact  Conjunctiva/sclera: Conjunctivae normal       Pupils: Pupils are equal, round, and reactive to light  Neck:      Vascular: No carotid bruit  Cardiovascular:      Rate and Rhythm: Normal rate  Heart sounds: Normal heart sounds  No murmur heard  No friction rub  No gallop  Pulmonary:      Effort: Pulmonary effort is normal  No respiratory distress  Breath sounds: Rhonchi and rales present  No wheezing  Abdominal:      General: Abdomen is flat  Bowel sounds are normal  There is no distension  Palpations: There is no mass  Tenderness: There is no abdominal tenderness  Hernia: No hernia is present  Musculoskeletal:         General: Tenderness (upper back) present  Cervical back: Normal range of motion  Right lower leg: No edema  Left lower leg: No edema  Lymphadenopathy:      Cervical: No cervical adenopathy  Skin:     General: Skin is warm  Capillary Refill: Capillary refill takes less than 2 seconds  Findings: No lesion or rash  Neurological:      General: No focal deficit present  Mental Status: He is alert and oriented to person, place, and time  Cranial Nerves: No cranial nerve deficit  Sensory: No sensory deficit  Motor: No weakness        Coordination: Coordination normal          Additional Data:     Labs:  Results from last 7 days   Lab Units 01/03/23  0438 01/02/23  1227   WBC Thousand/uL 10 17* 8 30   HEMOGLOBIN g/dL 14 5 15 0   HEMATOCRIT % 46 0 46 9   PLATELETS Thousands/uL 256 248   NEUTROS PCT %  --  48   LYMPHS PCT %  --  25   MONOS PCT %  --  19*   EOS PCT %  --  7*     Results from last 7 days   Lab Units 01/04/23  0612 01/03/23  0438 01/02/23  1227   SODIUM mmol/L 139   < > 142   POTASSIUM mmol/L 3 4*   < > 4 2   CHLORIDE mmol/L 98   < > 99   CO2 mmol/L 38*   < > 40*   BUN mg/dL 16   < > 15   CREATININE mg/dL 0 71   < > 0 76   ANION GAP mmol/L 3*   < > 3*   CALCIUM mg/dL 9 0   < > 9 5   ALBUMIN g/dL  --   --  3 6   TOTAL BILIRUBIN mg/dL  --   --  0 39   ALK PHOS U/L  --   --  63   ALT U/L  --   --  40   AST U/L  --   --  38   GLUCOSE RANDOM mg/dL 99   < > 103    < > = values in this interval not displayed  Results from last 7 days   Lab Units 01/02/23  1227   INR  0 98             Results from last 7 days   Lab Units 01/02/23  1227   LACTIC ACID mmol/L 1 0   PROCALCITONIN ng/ml <0 05       Lines/Drains:  Invasive Devices     Peripheral Intravenous Line  Duration           Peripheral IV 01/02/23 Right Antecubital 2 days                      Imaging: No pertinent imaging reviewed  Recent Cultures (last 7 days):   Results from last 7 days   Lab Units 01/02/23  1234 01/02/23  1227   BLOOD CULTURE  No Growth at 24 hrs  No Growth at 24 hrs         Last 24 Hours Medication List:   Current Facility-Administered Medications   Medication Dose Route Frequency Provider Last Rate   • acetaminophen  650 mg Oral Q6H PRN Conni Bernheim Dimler, CRNP     • aspirin  162 mg Oral Daily Conni Bernheim Dimler, CRNP     • calcium carbonate  1,000 mg Oral Daily PRN Conni Bernheim Dimler, CRNP     • cefTRIAXone  1,000 mg Intravenous Q24H Conni Bernheim Dimler, CRNP 1,000 mg (01/03/23 2059)   • cyanocobalamin  500 mcg Oral Daily Conni Bernheim Dimler, CRNP     • enoxaparin  40 mg Subcutaneous Q24H 2121 AMALIA Zhu     • finasteride  5 mg Oral Daily AMALIA Mondragon     • fluticasone  2 puff Inhalation BID AMALIA Mondragon     • furosemide  40 mg Intravenous BID (diuretic) AMALIA Mondragon     • guaiFENesin  600 mg Oral Q12H [address] Andrae Hercules MD     • ipratropium-albuterol  3 mL Nebulization Q6H AMALIA Mondragon     • lidocaine  1 patch Topical Daily Amanda Pichardo MD     • multivitamin stress formula  1 tablet Oral Daily AMALIA Mondragon     • nicotine  14 mg Transdermal Daily AMALIA Mondragon     • nicotine polacrilex  2 mg Oral Q2H PRN AMALIA Mondragon     • ondansetron  4 mg Intravenous Q6H PRN AMALIA Mondragon     • sodium chloride  1 spray Each Nare Q1H PRN Amanda Pichardo MD          Today, Patient Was Seen By: Amanda Pichardo MD    **Please Note: This note may have been constructed using a voice recognition system  **

## 2023-01-04 NOTE — CASE MANAGEMENT
Case Management Assessment & Discharge Planning Note    Patient name Eliel Ramirez  Location /-01 MRN [id number]  : 1955 Date 2023       Current Admission Date: 2023  Current Admission Diagnosis:Acute on chronic diastolic (congestive) heart failure [facility])   Patient Active Problem List    Diagnosis Date Noted   • Acute on chronic diastolic (congestive) heart failure ([facility] ) 2023   • Elevated lipase 2023   • Chronic respiratory failure with hypoxia ([facility] ) 2023   • Acute on chronic respiratory failure ([facility] ) 2022   • Acute diastolic (congestive) heart failure ([facility] ) 2022   • Morbid obesity with BMI of 40 0-44 9, adult ([place] [address] ) 2022   • COPD, severe ([place] [address] )    • Essential hypertension    • Tobacco abuse       LOS (days): 2  Geometric Mean LOS (GMLOS) (days): 3 90  Days to GMLOS:1 9     OBJECTIVE:    Risk of Unplanned Readmission Score: 11 54         Current admission status: Inpatient       Preferred Pharmacy:   [address] [place] [address]  Phone: [phone] Fax: [phone]    Primary Care Provider: Luis Miguel Santiago DO    Primary Insurance: MEDICARE  Secondary Insurance: [employer]      Discussed in rounds, potential dc tomorrow  CM met with patient at the bedside to obtained baseline information  Pt declined assessment, stating " I am fine and have everything I need" "I dont feel like answering any questions "          Readmission Root Cause  30 Day Readmission: No    Patient Information  Admitted from[de-identified] Home  Mental Status: Alert  During Assessment patient was accompanied by: Not accompanied during assessment  Assessment information provided by[de-identified] Patient  What city do you live in?: [address] [facility]: Lives w/ Spouse/significant other    Activities of Daily Living Prior to Admission  Does patient use assisted devices?: Yes  Assisted Devices (DME) used: Portable Oxygen tanks, Home Oxygen concentrator  DME Company Name (respiratory supplies): [employer]  O2 Rate(s): 3 liters         Patient Information Continued  Income Source: Pension/intermediate       DISCHARGE DETAILS:    Discharge planning discussed with[de-identified] patient      CM will continue to follow, anticipate home, no needs identified at this time                      IMM Given (Date):: 01/04/23  IMM Given to[de-identified] Patient

## 2023-01-04 NOTE — PLAN OF CARE
Problem: MOBILITY - ADULT  Goal: Maintain or return to baseline ADL function  Description: INTERVENTIONS:  -  Assess patient's ability to carry out ADLs; assess patient's baseline for ADL function and identify physical deficits which impact ability to perform ADLs (bathing, care of mouth/teeth, toileting, grooming, dressing, etc )  - Assess/evaluate cause of self-care deficits   - Assess range of motion  - Assess patient's mobility; develop plan if impaired  - Assess patient's need for assistive devices and provide as appropriate  - Encourage maximum independence but intervene and supervise when necessary  - Involve family in performance of ADLs  - Assess for home care needs following discharge   - Consider OT consult to assist with ADL evaluation and planning for discharge  - Provide patient education as appropriate  Outcome: Progressing  Goal: Maintains/Returns to pre admission functional level  Description: INTERVENTIONS:  - Perform BMAT or MOVE assessment daily    - Set and communicate daily mobility goal to care team and patient/family/caregiver  - Collaborate with rehabilitation services on mobility goals if consulted  - Perform Range of Motion 4 times a day  - Reposition patient every 2 hours    - Dangle patient 3 times a day  - Stand patient 3 times a day  - Ambulate patient 3 times a day  - Out of bed to chair 3 times a day   - Out of bed for meals 3 times a day  - Out of bed for toileting  - Record patient progress and toleration of activity level   Outcome: Progressing     Problem: PAIN - ADULT  Goal: Verbalizes/displays adequate comfort level or baseline comfort level  Description: Interventions:  - Encourage patient to monitor pain and request assistance  - Assess pain using appropriate pain scale  - Administer analgesics based on type and severity of pain and evaluate response  - Implement non-pharmacological measures as appropriate and evaluate response  - Consider cultural and social influences on pain and pain management  - Notify physician/advanced practitioner if interventions unsuccessful or patient reports new pain  Outcome: Progressing     Problem: INFECTION - ADULT  Goal: Absence or prevention of progression during hospitalization  Description: INTERVENTIONS:  - Assess and monitor for signs and symptoms of infection  - Monitor lab/diagnostic results  - Monitor all insertion sites, i e  indwelling lines, tubes, and drains  - Monitor endotracheal if appropriate and nasal secretions for changes in amount and color  - [place] appropriate cooling/warming therapies per order  - Administer medications as ordered  - Instruct and encourage patient and family to use good hand hygiene technique  - Identify and instruct in appropriate isolation precautions for identified infection/condition  Outcome: Progressing  Goal: Absence of fever/infection during neutropenic period  Description: INTERVENTIONS:  - Monitor WBC    Outcome: Progressing     Problem: DISCHARGE PLANNING  Goal: Discharge to home or other facility with appropriate resources  Description: INTERVENTIONS:  - Identify barriers to discharge w/patient and caregiver  - Arrange for needed discharge resources and transportation as appropriate  - Identify discharge learning needs (meds, wound care, etc )  - Arrange for interpretive services to assist at discharge as needed  - Refer to Case Management Department for coordinating discharge planning if the patient needs post-hospital services based on physician/advanced practitioner order or complex needs related to functional status, cognitive ability, or social support system  Outcome: Progressing     Problem: Knowledge Deficit  Goal: Patient/family/caregiver demonstrates understanding of disease process, treatment plan, medications, and discharge instructions  Description: Complete learning assessment and assess knowledge base    Interventions:  - Provide teaching at level of understanding  - Provide teaching via preferred learning methods  Outcome: Progressing     Problem: RESPIRATORY - ADULT  Goal: Achieves optimal ventilation and oxygenation  Description: INTERVENTIONS:  - Assess for changes in respiratory status         confusion ANTON, SOB, hypoxia  - Assess for changes in mentation and behavior  - Position to facilitate oxygenation and minimize respiratory effort  - Oxygen administered by appropriate delivery if ordered  - Initiate smoking cessation education as indicated  - Encourage broncho-pulmonary hygiene including cough, deep breathe, Incentive Spirometry  - Assess the need for suctioning and aspirate as needed  - Assess and instruct to report SOB or any respiratory difficulty  - Respiratory Therapy support as indicated  Outcome: Progressing     Problem: CARDIOVASCULAR - ADULT  Goal: Maintains optimal cardiac output and hemodynamic stability  Description: INTERVENTIONS:  - Monitor I/O, vital signs and rhythm  - Monitor for S/S and trends of decreased cardiac output  - Administer and titrate ordered vasoactive medications to optimize hemodynamic stability  - Assess quality of pulses, skin color and temperature  - Assess for signs of decreased coronary artery perfusion  - Instruct patient to report change in severity of symptoms  Outcome: Progressing  Goal: Absence of cardiac dysrhythmias or at baseline rhythm  Description: INTERVENTIONS:  - Continuous cardiac monitoring, vital signs, obtain 12 lead EKG if ordered  - Administer antiarrhythmic and heart rate control medications as ordered  - Monitor electrolytes and administer replacement therapy as ordered  Outcome: Progressing

## 2023-01-04 NOTE — ASSESSMENT & PLAN NOTE
Due to underlying COPD and uses 3 L of oxygen at home  Does not use any CPAP or BiPAP machine    Patient back to baseline  Check home O2 evaluation again

## 2023-01-04 NOTE — ASSESSMENT & PLAN NOTE
patient states that he uses 3 liters of oxygen at baseline but for the last 2 days he has been feeling worsening shortness of breath wheezing and he feels he has a pneumonia  Noted To have some COPD exacerbation  Chest x-ray does not show a pneumonia at this time however will treat with antibiotics due to possibility of bronchitis  placed on IV Rocephin as patient is allergic to Zithromax  cont oxygen and mucinex  Procalcitonin is negative however patient is adamant on continuing antibiotics  refusing CT chest as he cannot lay flat-continue current treatment, back to baseline oxygen saturations  In fact as per patient oxygen saturation varies with ambulation, will do home O2 evaluation in a m

## 2023-01-04 NOTE — NURSING NOTE
RN attempting to educate patient on meds and chf but patient is not receptive to education  Patient paranoid about staff, stating staff is mad at him which RN assured him is not the case as staff has addressed all his concerns  Patient refuses to ackowledge heart failure affecting circulation and condition of feet, declines elevating legs, stating he cannot lay flat  RN demonstrated to him that he could elevate legs in recliner and remain upright, but he refused  Patient dangling legs at bedside since start of shift  Patient states he believes he has diabetes even though several doctors have told him he has heart failure and informed RN she is wrong  Provider aware

## 2023-01-04 NOTE — PLAN OF CARE
Problem: MOBILITY - ADULT  Goal: Maintain or return to baseline ADL function  Description: INTERVENTIONS:  -  Assess patient's ability to carry out ADLs; assess patient's baseline for ADL function and identify physical deficits which impact ability to perform ADLs (bathing, care of mouth/teeth, toileting, grooming, dressing, etc )  - Assess/evaluate cause of self-care deficits   - Assess range of motion  - Assess patient's mobility; develop plan if impaired  - Assess patient's need for assistive devices and provide as appropriate  - Encourage maximum independence but intervene and supervise when necessary  - Involve family in performance of ADLs  - Assess for home care needs following discharge   - Consider OT consult to assist with ADL evaluation and planning for discharge  - Provide patient education as appropriate  Outcome: Progressing  Goal: Maintains/Returns to pre admission functional level  Description: INTERVENTIONS:  - Perform BMAT or MOVE assessment daily    - Set and communicate daily mobility goal to care team and patient/family/caregiver  - Collaborate with rehabilitation services on mobility goals if consulted  - Perform Range of Motion 4 times a day  - Reposition patient every 2 hours    - Dangle patient 3 times a day  - Stand patient 3 times a day  - Ambulate patient 3 times a day  - Out of bed to chair 3 times a day   - Out of bed for meals 3 times a day  - Out of bed for toileting  - Record patient progress and toleration of activity level   Outcome: Progressing     Problem: PAIN - ADULT  Goal: Verbalizes/displays adequate comfort level or baseline comfort level  Description: Interventions:  - Encourage patient to monitor pain and request assistance  - Assess pain using appropriate pain scale  - Administer analgesics based on type and severity of pain and evaluate response  - Implement non-pharmacological measures as appropriate and evaluate response  - Consider cultural and social influences on pain and pain management  - Notify physician/advanced practitioner if interventions unsuccessful or patient reports new pain  Outcome: Progressing     Problem: INFECTION - ADULT  Goal: Absence or prevention of progression during hospitalization  Description: INTERVENTIONS:  - Assess and monitor for signs and symptoms of infection  - Monitor lab/diagnostic results  - Monitor all insertion sites, i e  indwelling lines, tubes, and drains  - Monitor endotracheal if appropriate and nasal secretions for changes in amount and color  - [place] appropriate cooling/warming therapies per order  - Administer medications as ordered  - Instruct and encourage patient and family to use good hand hygiene technique  - Identify and instruct in appropriate isolation precautions for identified infection/condition  Outcome: Progressing  Goal: Absence of fever/infection during neutropenic period  Description: INTERVENTIONS:  - Monitor WBC    Outcome: Progressing     Problem: Knowledge Deficit  Goal: Patient/family/caregiver demonstrates understanding of disease process, treatment plan, medications, and discharge instructions  Description: Complete learning assessment and assess knowledge base    Interventions:  - Provide teaching at level of understanding  - Provide teaching via preferred learning methods  Outcome: Not Progressing     Problem: RESPIRATORY - ADULT  Goal: Achieves optimal ventilation and oxygenation  Description: INTERVENTIONS:  - Assess for changes in respiratory status         confusion ANTON, SOB, hypoxia  - Assess for changes in mentation and behavior  - Position to facilitate oxygenation and minimize respiratory effort  - Oxygen administered by appropriate delivery if ordered  - Initiate smoking cessation education as indicated  - Encourage broncho-pulmonary hygiene including cough, deep breathe, Incentive Spirometry  - Assess the need for suctioning and aspirate as needed  - Assess and instruct to report SOB or any respiratory difficulty  - Respiratory Therapy support as indicated  Outcome: Progressing     Problem: CARDIOVASCULAR - ADULT  Goal: Maintains optimal cardiac output and hemodynamic stability  Description: INTERVENTIONS:  - Monitor I/O, vital signs and rhythm  - Monitor for S/S and trends of decreased cardiac output  - Administer and titrate ordered vasoactive medications to optimize hemodynamic stability  - Assess quality of pulses, skin color and temperature  - Assess for signs of decreased coronary artery perfusion  - Instruct patient to report change in severity of symptoms  Outcome: Progressing  Goal: Absence of cardiac dysrhythmias or at baseline rhythm  Description: INTERVENTIONS:  - Continuous cardiac monitoring, vital signs, obtain 12 lead EKG if ordered  - Administer antiarrhythmic and heart rate control medications as ordered  - Monitor electrolytes and administer replacement therapy as ordered  Outcome: Progressing

## 2023-01-05 VITALS
RESPIRATION RATE: 21 BRPM | WEIGHT: 289.02 LBS | HEIGHT: 71 IN | TEMPERATURE: 97.6 F | BODY MASS INDEX: 40.46 KG/M2 | DIASTOLIC BLOOD PRESSURE: 65 MMHG | HEART RATE: 86 BPM | OXYGEN SATURATION: 92 % | SYSTOLIC BLOOD PRESSURE: 139 MMHG

## 2023-01-05 LAB
ALBUMIN SERPL BCP-MCNC: 3.5 G/DL (ref 3.5–5)
ALP SERPL-CCNC: 58 U/L (ref 46–116)
ALT SERPL W P-5'-P-CCNC: 40 U/L (ref 12–78)
ANION GAP SERPL CALCULATED.3IONS-SCNC: 5 MMOL/L (ref 4–13)
AST SERPL W P-5'-P-CCNC: 43 U/L (ref 5–45)
BASOPHILS # BLD AUTO: 0.08 THOUSANDS/ÂΜL (ref 0–0.1)
BASOPHILS NFR BLD AUTO: 1 % (ref 0–1)
BILIRUB SERPL-MCNC: 0.57 MG/DL (ref 0.2–1)
BUN SERPL-MCNC: 14 MG/DL (ref 5–25)
CALCIUM SERPL-MCNC: 9.3 MG/DL (ref 8.3–10.1)
CHLORIDE SERPL-SCNC: 98 MMOL/L (ref 96–108)
CO2 SERPL-SCNC: 36 MMOL/L (ref 21–32)
CREAT SERPL-MCNC: 0.67 MG/DL (ref 0.6–1.3)
EOSINOPHIL # BLD AUTO: 0.67 THOUSAND/ÂΜL (ref 0–0.61)
EOSINOPHIL NFR BLD AUTO: 7 % (ref 0–6)
ERYTHROCYTE [DISTWIDTH] IN BLOOD BY AUTOMATED COUNT: 13.9 % (ref 11.6–15.1)
GFR SERPL CREATININE-BSD FRML MDRD: 98 ML/MIN/1.73SQ M
GLUCOSE SERPL-MCNC: 118 MG/DL (ref 65–140)
HCT VFR BLD AUTO: 45.1 % (ref 36.5–49.3)
HGB BLD-MCNC: 14.5 G/DL (ref 12–17)
IMM GRANULOCYTES # BLD AUTO: 0.03 THOUSAND/UL (ref 0–0.2)
IMM GRANULOCYTES NFR BLD AUTO: 0 % (ref 0–2)
LYMPHOCYTES # BLD AUTO: 2.54 THOUSANDS/ÂΜL (ref 0.6–4.47)
LYMPHOCYTES NFR BLD AUTO: 25 % (ref 14–44)
MCH RBC QN AUTO: 30.7 PG (ref 26.8–34.3)
MCHC RBC AUTO-ENTMCNC: 32.2 G/DL (ref 31.4–37.4)
MCV RBC AUTO: 95 FL (ref 82–98)
MONOCYTES # BLD AUTO: 1.62 THOUSAND/ÂΜL (ref 0.17–1.22)
MONOCYTES NFR BLD AUTO: 16 % (ref 4–12)
NEUTROPHILS # BLD AUTO: 5.21 THOUSANDS/ÂΜL (ref 1.85–7.62)
NEUTS SEG NFR BLD AUTO: 51 % (ref 43–75)
NRBC BLD AUTO-RTO: 0 /100 WBCS
PLATELET # BLD AUTO: 252 THOUSANDS/UL (ref 149–390)
PMV BLD AUTO: 9.2 FL (ref 8.9–12.7)
POTASSIUM SERPL-SCNC: 4 MMOL/L (ref 3.5–5.3)
PROT SERPL-MCNC: 7.6 G/DL (ref 6.4–8.4)
RBC # BLD AUTO: 4.73 MILLION/UL (ref 3.88–5.62)
SODIUM SERPL-SCNC: 139 MMOL/L (ref 135–147)
WBC # BLD AUTO: 10.15 THOUSAND/UL (ref 4.31–10.16)

## 2023-01-05 RX ORDER — ALBUTEROL SULFATE 2.5 MG/3ML
2.5 SOLUTION RESPIRATORY (INHALATION) EVERY 6 HOURS PRN
Qty: 270 ML | Refills: 2 | Status: SHIPPED | OUTPATIENT
Start: 2023-01-05 | End: 2023-01-05 | Stop reason: SDUPTHER

## 2023-01-05 RX ORDER — ECHINACEA PURPUREA EXTRACT 125 MG
1 TABLET ORAL
Qty: 15 ML | Refills: 0 | Status: SHIPPED | OUTPATIENT
Start: 2023-01-05

## 2023-01-05 RX ORDER — POTASSIUM CHLORIDE 750 MG/1
20 TABLET, FILM COATED, EXTENDED RELEASE ORAL 2 TIMES DAILY
Qty: 60 TABLET | Refills: 0 | Status: SHIPPED | OUTPATIENT
Start: 2023-01-05 | End: 2023-01-20

## 2023-01-05 RX ORDER — CEFDINIR 300 MG/1
300 CAPSULE ORAL EVERY 12 HOURS SCHEDULED
Qty: 8 CAPSULE | Refills: 0 | Status: SHIPPED | OUTPATIENT
Start: 2023-01-05 | End: 2023-01-09

## 2023-01-05 RX ORDER — FUROSEMIDE 40 MG/1
40 TABLET ORAL 2 TIMES DAILY
Qty: 60 TABLET | Refills: 0 | Status: SHIPPED | OUTPATIENT
Start: 2023-01-05 | End: 2023-02-04

## 2023-01-05 RX ORDER — ALBUTEROL SULFATE 2.5 MG/3ML
2.5 SOLUTION RESPIRATORY (INHALATION) EVERY 6 HOURS PRN
Qty: 270 ML | Refills: 0 | Status: SHIPPED | OUTPATIENT
Start: 2023-01-05

## 2023-01-05 RX ORDER — GUAIFENESIN 600 MG/1
600 TABLET, EXTENDED RELEASE ORAL EVERY 12 HOURS SCHEDULED
Qty: 30 TABLET | Refills: 0 | Status: SHIPPED | OUTPATIENT
Start: 2023-01-05

## 2023-01-05 RX ORDER — LIDOCAINE 50 MG/G
1 PATCH TOPICAL DAILY
Qty: 15 PATCH | Refills: 0 | Status: SHIPPED | OUTPATIENT
Start: 2023-01-06

## 2023-01-05 RX ADMIN — ASPIRIN 81 MG CHEWABLE TABLET 162 MG: 81 TABLET CHEWABLE at 08:31

## 2023-01-05 RX ADMIN — CYANOCOBALAMIN TAB 500 MCG 500 MCG: 500 TAB at 08:32

## 2023-01-05 RX ADMIN — Medication 1 TABLET: at 08:33

## 2023-01-05 RX ADMIN — FUROSEMIDE 40 MG: 10 INJECTION, SOLUTION INTRAMUSCULAR; INTRAVENOUS at 08:32

## 2023-01-05 RX ADMIN — SALINE NASAL SPRAY 1 SPRAY: 1.5 SOLUTION NASAL at 11:01

## 2023-01-05 RX ADMIN — DOCUSATE SODIUM 100 MG: 100 CAPSULE ORAL at 08:31

## 2023-01-05 RX ADMIN — POTASSIUM CHLORIDE 40 MEQ: 1500 TABLET, EXTENDED RELEASE ORAL at 08:31

## 2023-01-05 RX ADMIN — NICOTINE POLACRILEX 2 MG: 2 GUM, CHEWING BUCCAL at 11:01

## 2023-01-05 RX ADMIN — FINASTERIDE 5 MG: 5 TABLET, FILM COATED ORAL at 08:31

## 2023-01-05 RX ADMIN — NICOTINE 14 MG: 14 PATCH, EXTENDED RELEASE TRANSDERMAL at 08:30

## 2023-01-05 RX ADMIN — IPRATROPIUM BROMIDE AND ALBUTEROL SULFATE 3 ML: 2.5; .5 SOLUTION RESPIRATORY (INHALATION) at 07:36

## 2023-01-05 RX ADMIN — LIDOCAINE 1 PATCH: 50 PATCH TOPICAL at 08:30

## 2023-01-05 NOTE — NURSING NOTE
Patient ringing call bell, stating his oxygen level dropped  RN assured patient his oxygen level was 94% and staff is aware when changes in oxygen level occur due to alarms  Patient then states, "I turned my oxygen up " RN instructed patient to ring the call bell for oxygen needs as staff needs to monitor oxygen level and patients should not use equipment due to risks of injury to self and need to monitor levels of oxygen required  Patient then yelled at RN stating, "I'll do what I want  I'll adjust it as I want  You're all coming in here giving me a hard time  My pulse ox said my level was 83% " RN again informed the patient that staff is able to monitor oxygen level, his level may drop with activity, but his oxygen levels are adequate and he should not rely on his pulse ox for accurate readings  Patient then states, "I'll do what I want and I want a manager " Rn informed the patient she will contact the supervisor for him to speak to and again asked if his oxygen level is concerning or he feels the need for more oxygen to alert staff via call giordano to which he replied, "i'll do what I want " Supervisor aware

## 2023-01-05 NOTE — NURSING NOTE
Patient asking for nicotine gum  RN offered gum but informed the patient the patch would need to come off due to double therapy to which he replied, "I don't want it then " RN again explained double therapy and reminded the patient he could have gum but not with the patch to which he replied, "You want to play games? I don't want to talk to you " RN assured patient she is not playing games and is only following policy

## 2023-01-05 NOTE — NURSING NOTE
General conversation had with patient for approximately 15 minutes  Patient able to maintain oxygen saturation of 94% on 3LNC without any episodes of desaturation  Patient reminded to deep breathe should oxygen level drop and to alert staff to any concerns to which patient verbalized understanding

## 2023-01-05 NOTE — ASSESSMENT & PLAN NOTE
Wt Readings from Last 3 Encounters:   01/05/23 131 kg (289 lb 0 4 oz)   08/01/22 131 kg (288 lb)   06/03/22 133 kg (293 lb 3 2 oz)     States that he has gained 5 pounds in the last few days  States he takes Lasix 40 mg daily and sometimes takes an additional 20 mg in the afternoon if he feels he is getting swollen once or twice a month  With IV diuretics, patient has good diuretic effects, patient lost -3 7 L  Patient will be discharged with Lasix 40 mg twice daily   Placed on low-salt diet and restriction of 1500 mL  Echocardiogram:There is mild concentric hypertrophy  The left ventricular ejection fraction is 65%  Systolic function is normal   Grade 1 diastolic dysfunction  Home O2 evaluation done, patient does require 1 L during resting, and 6 L during ambulation   (But as per patient, he feels more comfortable 2 liter)

## 2023-01-05 NOTE — CASE MANAGEMENT
Case Management Discharge Planning Note    Patient name Tito Ramirez  Location /-01 MRN [id number]  : 1955 Date 2023       Current Admission Date: 2023  Current Admission Diagnosis:Acute on chronic diastolic (congestive) heart failure [facility])   Patient Active Problem List    Diagnosis Date Noted   • Acute on chronic diastolic (congestive) heart failure ([facility] ) 2023   • Elevated lipase 2023   • Chronic respiratory failure with hypoxia ([facility] ) 2023   • Acute on chronic respiratory failure ([facility] ) 2022   • Acute diastolic (congestive) heart failure ([facility] ) 2022   • Morbid obesity with BMI of 40 0-44 9, adult ([email or web address] ) 2022   • COPD, severe ([place] [address] )    • Essential hypertension    • Tobacco abuse       LOS (days): 3  Geometric Mean LOS (GMLOS) (days): 3 90  Days to GMLOS:0 9     OBJECTIVE:  Risk of Unplanned Readmission Score: 12 11         Current admission status: Inpatient   Preferred Pharmacy:   [address] [place] [address]  Phone: [phone] Fax: [phone]    [id number] [place] [facility] [address]  [facility] [address]  Phone: [phone] Fax: [phone]    Primary Care Provider: Pa Burnette DO    Primary Insurance: MEDICARE  Secondary Insurance: [facility]    DISCHARGE DETAILS:    Patient is being dc today  02 studies repeated for reassessment, patient requires 1 liter at rest and 6 liters with activity  Pt has a nebulizer at home  CM spoke with patient , with provider of the change of 02 requirements  CM contacted [id number] and spoke with [facility] at [phone]   Faxed clinical information with new order to [phone]  DC plan is home

## 2023-01-05 NOTE — ASSESSMENT & PLAN NOTE
patient states that he uses 3 liters of oxygen at baseline but for the last 2 days he has been feeling worsening shortness of breath wheezing and he feels he has a pneumonia  Noted To have some COPD exacerbation  Chest x-ray does not show a pneumonia at this time however will treat with antibiotics due to possibility of bronchitis  placed on IV Rocephin as patient is allergic to Zithromax-patient received adequate dose of antibiotic even though there is no signs of infection (patient was adamant that something is going in her lung )  cont oxygen and mucinex  Procalcitonin is negative however patient is adamant on continuing antibiotics  refusing CT chest as he cannot lay flat-continue current treatment, back to baseline oxygen saturations  Home O2 evaluation done, patient does require 1 L of oxygen during resting, and 6 L during ambulation   (As per patient, she was unable to tolerate CPAP/BiPAP)

## 2023-01-05 NOTE — NURSING NOTE
Patient noted to be more cooperative with RN, but still labile as RN reviewed meds that are due to which he replied, "You're 9 minutes late " RN informed patient she will bring meds but there is an hour window in which meds can be given in order to ease his concerns  POC and oxygen usage and levels again reviewed with patient as he initiated conversation  RN attempting to alleviate stressors which have lessened  Patient reminded to alert staff to any concerns to which he verbalized understanding

## 2023-01-05 NOTE — PLAN OF CARE
Problem: MOBILITY - ADULT  Goal: Maintain or return to baseline ADL function  Description: INTERVENTIONS:  -  Assess patient's ability to carry out ADLs; assess patient's baseline for ADL function and identify physical deficits which impact ability to perform ADLs (bathing, care of mouth/teeth, toileting, grooming, dressing, etc )  - Assess/evaluate cause of self-care deficits   - Assess range of motion  - Assess patient's mobility; develop plan if impaired  - Assess patient's need for assistive devices and provide as appropriate  - Encourage maximum independence but intervene and supervise when necessary  - Involve family in performance of ADLs  - Assess for home care needs following discharge   - Consider OT consult to assist with ADL evaluation and planning for discharge  - Provide patient education as appropriate  1/4/2023 2320 by Geovanni Newton RN  Outcome: Progressing  1/4/2023 1215 by Geovanni Newton RN  Outcome: Progressing  Goal: Maintains/Returns to pre admission functional level  Description: INTERVENTIONS:  - Perform BMAT or MOVE assessment daily    - Set and communicate daily mobility goal to care team and patient/family/caregiver  - Collaborate with rehabilitation services on mobility goals if consulted  - Perform Range of Motion 4 times a day  - Reposition patient every 2 hours    - Dangle patient 3 times a day  - Stand patient 3 times a day  - Ambulate patient 3 times a day  - Out of bed to chair 3 times a day   - Out of bed for meals 3 times a day  - Out of bed for toileting  - Record patient progress and toleration of activity level   1/4/2023 2320 by Geovanni Newton RN  Outcome: Progressing  1/4/2023 1215 by Geovanni Newton RN  Outcome: Progressing     Problem: PAIN - ADULT  Goal: Verbalizes/displays adequate comfort level or baseline comfort level  Description: Interventions:  - Encourage patient to monitor pain and request assistance  - Assess pain using appropriate pain scale  - Administer analgesics based on type and severity of pain and evaluate response  - Implement non-pharmacological measures as appropriate and evaluate response  - Consider cultural and social influences on pain and pain management  - Notify physician/advanced practitioner if interventions unsuccessful or patient reports new pain  1/4/2023 2320 by Genoveva Nieves RN  Outcome: Progressing  1/4/2023 1215 by Genoveva Nieves RN  Outcome: Progressing     Problem: INFECTION - ADULT  Goal: Absence or prevention of progression during hospitalization  Description: INTERVENTIONS:  - Assess and monitor for signs and symptoms of infection  - Monitor lab/diagnostic results  - Monitor all insertion sites, i e  indwelling lines, tubes, and drains  - Monitor endotracheal if appropriate and nasal secretions for changes in amount and color  - [place] appropriate cooling/warming therapies per order  - Administer medications as ordered  - Instruct and encourage patient and family to use good hand hygiene technique  - Identify and instruct in appropriate isolation precautions for identified infection/condition  1/4/2023 2320 by Genoveva Nieves RN  Outcome: Progressing  1/4/2023 1215 by Genoveva Nieves RN  Outcome: Progressing  Goal: Absence of fever/infection during neutropenic period  Description: INTERVENTIONS:  - Monitor WBC    1/4/2023 2320 by Genoveva Nieves RN  Outcome: Progressing  1/4/2023 1215 by Genoveva Nieves RN  Outcome: Progressing     Problem: DISCHARGE PLANNING  Goal: Discharge to home or other facility with appropriate resources  Description: INTERVENTIONS:  - Identify barriers to discharge w/patient and caregiver  - Arrange for needed discharge resources and transportation as appropriate  - Identify discharge learning needs (meds, wound care, etc )  - Arrange for interpretive services to assist at discharge as needed  - Refer to Case Management Department for coordinating discharge planning if the patient needs post-hospital services based on physician/advanced practitioner order or complex needs related to functional status, cognitive ability, or social support system  1/4/2023 2320 by Rhiannon Mendez RN  Outcome: Progressing  1/4/2023 1215 by Rhiannon Mendez RN  Outcome: Progressing     Problem: Knowledge Deficit  Goal: Patient/family/caregiver demonstrates understanding of disease process, treatment plan, medications, and discharge instructions  Description: Complete learning assessment and assess knowledge base    Interventions:  - Provide teaching at level of understanding  - Provide teaching via preferred learning methods  1/4/2023 2320 by Rhiannon Mendez RN  Outcome: Not Progressing  1/4/2023 1215 by Rhiannon Mendez RN  Outcome: Not Progressing

## 2023-01-05 NOTE — RESPIRATORY THERAPY NOTE
Home Oxygen Qualifying Test     Patient name: Willian Zavaleta        : 1955   Date of Test:  2023  Diagnosis:    Home Oxygen Test:    **Medicare Guidelines require item(s) 1-5 on all ambulatory patients or 1 and 2 on non-ambulatory patients  1  Baseline SPO2 on Room Air at rest 87 %   a  If <= 88% on Room Air add O2 via NC to obtain SpO2 >=88%  If LPM needed, document LPM 1 needed to reach =>88%    2  SPO2 during exertion on Room Air   %  a  During exertion monitor SPO2  If SPO2 increases >=89%, do not add supplemental oxygen    3  SPO2 on Oxygen at Rest 93 % at 1 LPM    4  SPO2 during exertion on Oxygen 92 % at 6 LPM    5  Test performed during exertion activity  [x]  Supplemental Home Oxygen is indicated  []  Client does not qualify for home oxygen      Respiratory Additional Notes- Pt was 87% on 5L w/ exertion  Kelly Smoke, RT

## 2023-01-05 NOTE — RESPIRATORY THERAPY NOTE
Pt states he does not want he O2 to fall below 95% and that I am not allowed to titrate it   I tried three times to explain the home O2 evaluation to him but he refused too listen Pt states I should contact the [address] [place] [address] to educate myself on acceptable O2 levels

## 2023-01-05 NOTE — ASSESSMENT & PLAN NOTE
Due to underlying COPD and uses 3 L of oxygen at home  Does not use any CPAP or BiPAP machine (as per patient, he was unable to tolerate)  Home O2 evaluation done-as per test, patient did require 1 L of oxygen during resting, and 6 L off during ambulation   ([place]enn the results with patient, but patient is adamant and reports he feels more comfortable with 2 L during resting)

## 2023-01-05 NOTE — PLAN OF CARE
Pt slept sitting on edge of bed over bedside table  RN discussed safety of this position but pt refused to lay in bed or sit in recliner  RN attempted to educate pt on elevating feet, deep breathing  Pt not interested in education  Pt for home O2 eval today and possible d/c  Labs drawn with no major abnormalities  Pt continues on IV abx for possible bronchitis  Will continue to monitor and support as needed  Problem: MOBILITY - ADULT  Goal: Maintain or return to baseline ADL function  Description: INTERVENTIONS:  -  Assess patient's ability to carry out ADLs; assess patient's baseline for ADL function and identify physical deficits which impact ability to perform ADLs (bathing, care of mouth/teeth, toileting, grooming, dressing, etc )  - Assess/evaluate cause of self-care deficits   - Assess range of motion  - Assess patient's mobility; develop plan if impaired  - Assess patient's need for assistive devices and provide as appropriate  - Encourage maximum independence but intervene and supervise when necessary  - Involve family in performance of ADLs  - Assess for home care needs following discharge   - Consider OT consult to assist with ADL evaluation and planning for discharge  - Provide patient education as appropriate  Outcome: Progressing  Goal: Maintains/Returns to pre admission functional level  Description: INTERVENTIONS:  - Perform BMAT or MOVE assessment daily    - Set and communicate daily mobility goal to care team and patient/family/caregiver  - Collaborate with rehabilitation services on mobility goals if consulted  - Perform Range of Motion 4 times a day  - Reposition patient every 2 hours    - Dangle patient 3 times a day  - Stand patient 3 times a day  - Ambulate patient 3 times a day  - Out of bed to chair 3 times a day   - Out of bed for meals 3 times a day  - Out of bed for toileting  - Record patient progress and toleration of activity level   Outcome: Progressing     Problem: PAIN - ADULT  Goal: Verbalizes/displays adequate comfort level or baseline comfort level  Description: Interventions:  - Encourage patient to monitor pain and request assistance  - Assess pain using appropriate pain scale  - Administer analgesics based on type and severity of pain and evaluate response  - Implement non-pharmacological measures as appropriate and evaluate response  - Consider cultural and social influences on pain and pain management  - Notify physician/advanced practitioner if interventions unsuccessful or patient reports new pain  Outcome: Progressing     Problem: INFECTION - ADULT  Goal: Absence or prevention of progression during hospitalization  Description: INTERVENTIONS:  - Assess and monitor for signs and symptoms of infection  - Monitor lab/diagnostic results  - Monitor all insertion sites, i e  indwelling lines, tubes, and drains  - Monitor endotracheal if appropriate and nasal secretions for changes in amount and color  - [place] appropriate cooling/warming therapies per order  - Administer medications as ordered  - Instruct and encourage patient and family to use good hand hygiene technique  - Identify and instruct in appropriate isolation precautions for identified infection/condition  Outcome: Progressing     Problem: DISCHARGE PLANNING  Goal: Discharge to home or other facility with appropriate resources  Description: INTERVENTIONS:  - Identify barriers to discharge w/patient and caregiver  - Arrange for needed discharge resources and transportation as appropriate  - Identify discharge learning needs (meds, wound care, etc )  - Arrange for interpretive services to assist at discharge as needed  - Refer to Case Management Department for coordinating discharge planning if the patient needs post-hospital services based on physician/advanced practitioner order or complex needs related to functional status, cognitive ability, or social support system  Outcome: Progressing     Problem: Knowledge Deficit  Goal: Patient/family/caregiver demonstrates understanding of disease process, treatment plan, medications, and discharge instructions  Description: Complete learning assessment and assess knowledge base    Interventions:  - Provide teaching at level of understanding  - Provide teaching via preferred learning methods  Outcome: Progressing     Problem: RESPIRATORY - ADULT  Goal: Achieves optimal ventilation and oxygenation  Description: INTERVENTIONS:  - Assess for changes in respiratory status         confusion ANTON, SOB, hypoxia  - Assess for changes in mentation and behavior  - Position to facilitate oxygenation and minimize respiratory effort  - Oxygen administered by appropriate delivery if ordered  - Initiate smoking cessation education as indicated  - Encourage broncho-pulmonary hygiene including cough, deep breathe, Incentive Spirometry  - Assess the need for suctioning and aspirate as needed  - Assess and instruct to report SOB or any respiratory difficulty  - Respiratory Therapy support as indicated  Outcome: Progressing     Problem: CARDIOVASCULAR - ADULT  Goal: Maintains optimal cardiac output and hemodynamic stability  Description: INTERVENTIONS:  - Monitor I/O, vital signs and rhythm  - Monitor for S/S and trends of decreased cardiac output  - Administer and titrate ordered vasoactive medications to optimize hemodynamic stability  - Assess quality of pulses, skin color and temperature  - Assess for signs of decreased coronary artery perfusion  - Instruct patient to report change in severity of symptoms  Outcome: Progressing  Goal: Absence of cardiac dysrhythmias or at baseline rhythm  Description: INTERVENTIONS:  - Continuous cardiac monitoring, vital signs, obtain 12 lead EKG if ordered  - Administer antiarrhythmic and heart rate control medications as ordered  - Monitor electrolytes and administer replacement therapy as ordered  Outcome: Progressing     Problem: Potential for Falls  Goal: Patient will remain free of falls  Description: INTERVENTIONS:  - Educate patient/family on patient safety including physical limitations  - Instruct patient to call for assistance with activity   - Consult OT/PT to assist with strengthening/mobility   - Keep Call bell within reach  - Keep bed low and locked with side rails adjusted as appropriate  - Keep care items and personal belongings within reach  - Initiate and maintain comfort rounds  - Make Fall Risk Sign visible to staff  - Apply yellow socks and bracelet for high fall risk patients  - Consider moving patient to room near nurses station  Outcome: Progressing

## 2023-01-05 NOTE — DISCHARGE SUMMARY
[address]  Discharge- Marcia Ramirez 1955, 75 y o  male MRN: [id number]  Unit/Bed#: -01 Encounter: [id number]  Primary Care Provider: Tamera Moreira DO   Date and time admitted to hospital: 1/2/2023 11:58 AM    * Acute on chronic diastolic (congestive) heart failure (HCC)  Assessment & Plan  Wt Readings from Last 3 Encounters:   01/05/23 131 kg (289 lb 0 4 oz)   08/01/22 131 kg (288 lb)   06/03/22 133 kg (293 lb 3 2 oz)     States that he has gained 5 pounds in the last few days  States he takes Lasix 40 mg daily and sometimes takes an additional 20 mg in the afternoon if he feels he is getting swollen once or twice a month  With IV diuretics, patient has good diuretic effects, patient lost -3 7 L  Patient will be discharged with Lasix 40 mg twice daily   Placed on low-salt diet and restriction of 1500 mL  Echocardiogram:There is mild concentric hypertrophy  The left ventricular ejection fraction is 65%  Systolic function is normal   Grade 1 diastolic dysfunction  Home O2 evaluation done, patient does require 1 L during resting, and 6 L during ambulation  (But as per patient, he feels more comfortable 2 liter)          Chronic respiratory failure with hypoxia [facility])  Assessment & Plan  Due to underlying COPD and uses 3 L of oxygen at home  Does not use any CPAP or BiPAP machine (as per patient, he was unable to tolerate)  Home O2 evaluation done-as per test, patient did require 1 L of oxygen during resting, and 6 L off during ambulation  (Glenn the results with patient, but patient is adamant and reports he feels more comfortable with 2 L during resting)    Elevated lipase  Assessment & Plan  Normalized    Morbid obesity with BMI of 40 0-44 9, adult ([facility] [address] )  Assessment & Plan  bmi 40 17    Will need diet exercise and lifestyle modification counseling    Tobacco abuse  Assessment & Plan  counselled on smoking cessation and placed on nicotine replacement therapy as needed    Essential hypertension  Assessment & Plan  Resume home medication    COPD, severe ([address] )  Assessment & Plan  patient states that he uses 3 liters of oxygen at baseline but for the last 2 days he has been feeling worsening shortness of breath wheezing and he feels he has a pneumonia  Noted To have some COPD exacerbation  Chest x-ray does not show a pneumonia at this time however will treat with antibiotics due to possibility of bronchitis  placed on IV Rocephin as patient is allergic to Zithromax-patient received adequate dose of antibiotic even though there is no signs of infection (patient was adamant that something is going in her lung )  cont oxygen and mucinex  Procalcitonin is negative however patient is adamant on continuing antibiotics  refusing CT chest as he cannot lay flat-continue current treatment, back to baseline oxygen saturations  Home O2 evaluation done, patient does require 1 L of oxygen during resting, and 6 L during ambulation  (As per patient, she was unable to tolerate CPAP/BiPAP)      Medical Problems     Resolved Problems  Date Reviewed: 1/3/2023   None       Discharging Physician / Practitioner: Vidhya Ortega MD  PCP: Norma Anthony DO  Admission Date:   Admission Orders (From admission, onward)     Ordered        01/02/23 [address] [place] [address]  Once                      Discharge Date: 01/05/23    Consultations During Hospital Stay:  · none    Procedures Performed:   XR chest 1 view portable    Result Date: 1/2/2023  Narrative: CHEST INDICATION:   Short of breath  COMPARISON:  CXR 06/02/2022 and chest CT 05/31/2022  EXAM PERFORMED/VIEWS:  XR CHEST PORTABLE FINDINGS: Cardiomediastinal silhouette appears unremarkable  The lungs are clear  No pneumothorax or pleural effusion  Osseous structures appear within normal limits for patient age  Impression: No acute cardiopulmonary disease   Workstation performed: [id number]     Echo complete w/ contrast if indicated    Result Date: 1/3/2023  · Narrative: •  Left Ventricle: Left ventricular cavity size is normal  Wall thickness is increased  There is mild concentric hypertrophy  The left ventricular ejection fraction is 65%  Systolic function is normal  Although no diagnostic regional wall motion abnormality was identified, this possibility cannot be completely excluded on the basis of this study  Diastolic function is mildly abnormal, consistent with grade I (abnormal) relaxation  Left atrial filling pressure is normal  •  Right Ventricle: Right ventricle is not well visualized  Right ventricular cavity size is upper normal  Systolic function is normal  Normal tricuspid annular plane systolic excursion (TAPSE) > 1 7 cm  •  Left Atrium: The atrium is dilated  •  Mitral Valve: There is trace regurgitation  There is no evidence of stenosis  •  Tricuspid Valve: There is trace regurgitation  There is no evidence of stenosis  •  Pulmonic Valve: There is trace regurgitation  •  Prior TTE study available for comparison  Prior study date: 5/31/2022  Changes noted when compared to prior study  Changes include: Mild aortic stenosis noted on prior study  No other significant changes noted  Of note, no aortic stenosis was noted on prior study dated 4/19/2021     ·     Significant Findings / Test Results:   Lab Results   Component Value Date    WBC 10 15 01/05/2023    HGB 14 5 01/05/2023    HCT 45 1 01/05/2023    MCV 95 01/05/2023     01/05/2023   ·   Lab Results   Component Value Date    SODIUM 139 01/05/2023    K 4 0 01/05/2023    CL 98 01/05/2023    CO2 36 (H) 01/05/2023    AGAP 5 01/05/2023    BUN 14 01/05/2023    CREATININE 0 67 01/05/2023    GLUC 118 01/05/2023    CALCIUM 9 3 01/05/2023    AST 43 01/05/2023    ALT 40 01/05/2023    ALKPHOS 58 01/05/2023    TP 7 6 01/05/2023    TBILI 0 57 01/05/2023    EGFR 98 01/05/2023   ·   Collected Updated Procedure Result Status Patient Facility Result Comment    01/02/2023 1234 01/04/2023 2101 Blood culture #1 [[id number]]   Blood from Arm, Left    Preliminary result [address]  Component Value   Blood Culture No Growth at 48 hrs  P             01/02/2023 1227 01/02/2023 1312 FLU/RSV/COVID - if FLU/RSV clinically relevant [[id number]]    Nares from Nasopharyngeal Swab    Final result [facility] - copy/paste COVID Guidelines URL to browser: [email or web address]  [employer]   SARS-CoV-2 assay is a Nucleic Acid Amplification assay intended for the   qualitative detection of nucleic acid from SARS-CoV-2 in nasopharyngeal   swabs  Results are for the presumptive identification of SARS-CoV-2 RNA  Positive results are indicative of infection with SARS-CoV-2, the virus   causing COVID-19, but do not rule out bacterial infection or co-infection   with other viruses  Laboratories within the [place] and its   territories are required to report all positive results to the appropriate   public health authorities  Negative results do not preclude SARS-CoV-2   infection and should not be used as the sole basis for treatment or other   patient management decisions  Negative results must be combined with   clinical observations, patient history, and epidemiological information  This test has not been FDA cleared or approved  This test has been authorized by FDA under an Emergency Use Authorization   (EUA)  This test is only authorized for the duration of time the   declaration that circumstances exist justifying the authorization of the   emergency use of an in vitro diagnostic tests for detection of SARS-CoV-2   virus and/or diagnosis of COVID-19 infection under section 564(b)(1) of   the Act, 21 U  S C  331QFC-5(Q)(7), unless the authorization is terminated   or revoked sooner  The test has been validated but independent review by FDA   and CLIA is pending     Test performed using [employer]: This RT-PCR assay targets N2,   a region unique to SARS-CoV-2  A conserved region in the E-gene was chosen   for pan-Sarbecovirus detection which includes SARS-CoV-2  According to CMS-2020-01-R, this platform meets the definition of high-throughput technology  Component Value   SARS-CoV-2 Negative   INFLUENZA A PCR Negative   INFLUENZA B PCR Negative   RSV PCR Negative          01/02/2023 1227 01/04/2023 2101 Blood culture #2 [[id number]]   Blood from Arm, Right    Preliminary result [address]  Component Value   Blood Culture No Growth at 48 hrs  P               ·     Incidental Findings:   · As mentioned above  · I reviewed the above mentioned incidental findings with the patient and/or family and they expressed understanding  Test Results Pending at Discharge (will require follow up): · None     Outpatient Tests Requested:  · None    Complications: None    Reason for Admission: Shortness of breath    Hospital Course:   Grace Ramirez is a 75 y o  male patient who originally presented to the hospital on 1/2/2023 due to shortness of breath due to acute on chronic diastolic heart failure  Patient placed on IV diuretics, with the treatment, patient diuresed -3 7 L  Patient weight down to 289 pounds 0 4 ounce from 290 pounds 3 2 ounces  Renal function remained stable, labs remained stable  Patient blood culture shows no growth, COVID/flu panel negative -Since patient was thinking and was not believe that he was having some infection of the lung, patient received antibiotic  During the hospitalization,, multiple times, patient was bringing the issue that he believes that he has diabetes since he has all the symptoms  Discussed with the patient's regarding the labs and multiple A1c levels which does not support the patient has diabetes    Patient also reports like an resting with 3 L (which was his baseline oxygen requirement)-patient does good, but during change of position or ambulation his oxygen level goes down  Home oxygen evaluation done-which shows patient does required 4 L of oxygen during rest and 6 L during ambulation  (As per note, initially patient was not cooperative with respiratory therapist for home O2 evaluation)  On discharge date, patient also reports that the previous attending was told him that they will prescribe some medications for his vinyl floor-dust inhalation  (E7 patient was talking about antihistamine or steroid nasal spray-patient is allergic to multiple medications )    Patient is aware that Lidoderm patch might not covered by insurance  Lab results, imaging findings, treatment plan and option discussed in details with patient and his wife on the bedside  Verbalized understanding agrees  And advised to follow-up with PCP and pulmonologist on outpatient basis for further recommendations  Please see above list of diagnoses and related plan for additional information  Condition at Discharge: stable    Discharge Day Visit / Exam:   Subjective: Seen and evaluated during rounds  Resting comfortably  Patient reports overnight especially early morning his oxygen requirement goes up and after he clears up his throat, he feels much better  He is placed time of the day in between morning and evening  Patient also frustrated that he was requested the nurse and PCA to empty the urinal-after doing that, the staff (even elevated close to his coffee (patient took a picture)-this provider addressed the issue, and notified the nursing supervisor  Vitals: Blood Pressure: 139/65 (01/05/23 0700)  Pulse: 84 (01/05/23 0700)  Temperature: 97 6 °F (36 4 °C) (01/05/23 0700)  Temp Source: Temporal (01/05/23 0700)  Respirations: 21 (01/05/23 0700)  Height: 5' 11" (180 3 cm) (01/03/23 0926)  Weight - Scale: 131 kg (289 lb 0 4 oz) (01/05/23 0500)  SpO2: 97 % (01/05/23 0738)  Exam:   Physical Exam  Vitals and nursing note reviewed   Exam conducted with a chaperone present  Constitutional:       Appearance: Normal appearance  He is obese  He is not ill-appearing or diaphoretic  HENT:      Nose: Nose normal  No congestion  Mouth/Throat:      Mouth: Mucous membranes are moist       Pharynx: Oropharynx is clear  No oropharyngeal exudate  Eyes:      General: No scleral icterus  Left eye: No discharge  Extraocular Movements: Extraocular movements intact  Conjunctiva/sclera: Conjunctivae normal       Pupils: Pupils are equal, round, and reactive to light  Cardiovascular:      Rate and Rhythm: Normal rate  Heart sounds: Normal heart sounds  No murmur heard  No friction rub  No gallop  Pulmonary:      Effort: Pulmonary effort is normal  No respiratory distress  Breath sounds: No stridor  No wheezing or rhonchi  Abdominal:      General: Abdomen is flat  Bowel sounds are normal  There is no distension  Palpations: There is no mass  Tenderness: There is no abdominal tenderness  Hernia: No hernia is present  Musculoskeletal:         General: Tenderness (neck area) present  No swelling or deformity  Skin:     Findings: No erythema or lesion  Neurological:      Mental Status: He is alert and oriented to person, place, and time  Cranial Nerves: No cranial nerve deficit  Sensory: No sensory deficit  Motor: No weakness  Coordination: Coordination normal    Psychiatric:         Mood and Affect: Mood normal          Discussion with Family: Updated  (wife) at bedside  Discharge instructions/Information to patient and family:   See after visit summary for information provided to patient and family  Provisions for Follow-Up Care:  See after visit summary for information related to follow-up care and any pertinent home health orders         Disposition:   Home    Planned Readmission: If condition get worse     Discharge Statement:   Greater than 50% of the total time was spent examining patient, answering all patient questions, arranging and discussing plan of care with patient as well as directly providing post-discharge instructions  Additional time then spent on discharge activities  Discharge Medications:  See after visit summary for reconciled discharge medications provided to patient and/or family        **Please Note: This note may have been constructed using a voice recognition system**

## 2023-01-07 LAB
BACTERIA BLD CULT: NORMAL
BACTERIA BLD CULT: NORMAL

## 2023-01-08 LAB
ATRIAL RATE: 78 BPM
P AXIS: 66 DEGREES
PR INTERVAL: 192 MS
QRS AXIS: 20 DEGREES
QRSD INTERVAL: 114 MS
QT INTERVAL: 412 MS
QTC INTERVAL: 469 MS
T WAVE AXIS: 17 DEGREES
VENTRICULAR RATE: 78 BPM

## 2023-07-05 ENCOUNTER — APPOINTMENT (EMERGENCY)
Dept: RADIOLOGY | Facility: HOSPITAL | Age: 68
End: 2023-07-05
Payer: MEDICARE

## 2023-07-05 ENCOUNTER — HOSPITAL ENCOUNTER (EMERGENCY)
Facility: HOSPITAL | Age: 68
Discharge: HOME | End: 2023-07-05
Attending: EMERGENCY MEDICINE | Admitting: EMERGENCY MEDICINE
Payer: MEDICARE

## 2023-07-05 VITALS
TEMPERATURE: 97.6 F | OXYGEN SATURATION: 97 % | SYSTOLIC BLOOD PRESSURE: 158 MMHG | BODY MASS INDEX: 41.02 KG/M2 | WEIGHT: 292.99 LBS | HEART RATE: 84 BPM | HEIGHT: 71 IN | DIASTOLIC BLOOD PRESSURE: 78 MMHG | RESPIRATION RATE: 20 BRPM

## 2023-07-05 DIAGNOSIS — F17.200 TOBACCO USE DISORDER: ICD-10-CM

## 2023-07-05 DIAGNOSIS — J32.9 CHRONIC SINUSITIS: Primary | ICD-10-CM

## 2023-07-05 LAB
ALBUMIN SERPL BCP-MCNC: 4 G/DL (ref 3.5–5)
ALP SERPL-CCNC: 51 U/L (ref 34–104)
ALT SERPL W P-5'-P-CCNC: 28 U/L (ref 7–52)
ANION GAP SERPL CALCULATED.3IONS-SCNC: 4 MMOL/L
AST SERPL W P-5'-P-CCNC: 30 U/L (ref 13–39)
BASOPHILS # BLD AUTO: 0.06 THOUSANDS/ÂΜL (ref 0–0.1)
BASOPHILS NFR BLD AUTO: 1 % (ref 0–1)
BILIRUB SERPL-MCNC: 0.34 MG/DL (ref 0.2–1)
BNP SERPL-MCNC: 25 PG/ML (ref 0–100)
BUN SERPL-MCNC: 12 MG/DL (ref 5–25)
CALCIUM SERPL-MCNC: 9.5 MG/DL (ref 8.4–10.2)
CARDIAC TROPONIN I PNL SERPL HS: 11 NG/L
CHLORIDE SERPL-SCNC: 98 MMOL/L (ref 96–108)
CO2 SERPL-SCNC: 35 MMOL/L (ref 21–32)
CREAT SERPL-MCNC: 0.54 MG/DL (ref 0.6–1.3)
EOSINOPHIL # BLD AUTO: 0.36 THOUSAND/ÂΜL (ref 0–0.61)
EOSINOPHIL NFR BLD AUTO: 4 % (ref 0–6)
ERYTHROCYTE [DISTWIDTH] IN BLOOD BY AUTOMATED COUNT: 13.2 % (ref 11.6–15.1)
GFR SERPL CREATININE-BSD FRML MDRD: 107 ML/MIN/1.73SQ M
GLUCOSE SERPL-MCNC: 106 MG/DL (ref 65–140)
HCT VFR BLD AUTO: 47.6 % (ref 36.5–49.3)
HGB BLD-MCNC: 15 G/DL (ref 12–17)
IMM GRANULOCYTES # BLD AUTO: 0.03 THOUSAND/UL (ref 0–0.2)
IMM GRANULOCYTES NFR BLD AUTO: 0 % (ref 0–2)
LYMPHOCYTES # BLD AUTO: 2.03 THOUSANDS/ÂΜL (ref 0.6–4.47)
LYMPHOCYTES NFR BLD AUTO: 23 % (ref 14–44)
MCH RBC QN AUTO: 30.5 PG (ref 26.8–34.3)
MCHC RBC AUTO-ENTMCNC: 31.5 G/DL (ref 31.4–37.4)
MCV RBC AUTO: 97 FL (ref 82–98)
MONOCYTES # BLD AUTO: 1.49 THOUSAND/ÂΜL (ref 0.17–1.22)
MONOCYTES NFR BLD AUTO: 17 % (ref 4–12)
NEUTROPHILS # BLD AUTO: 4.72 THOUSANDS/ÂΜL (ref 1.85–7.62)
NEUTS SEG NFR BLD AUTO: 55 % (ref 43–75)
NRBC BLD AUTO-RTO: 0 /100 WBCS
PLATELET # BLD AUTO: 232 THOUSANDS/UL (ref 149–390)
PMV BLD AUTO: 9.7 FL (ref 8.9–12.7)
POTASSIUM SERPL-SCNC: 4 MMOL/L (ref 3.5–5.3)
PROT SERPL-MCNC: 7.1 G/DL (ref 6.4–8.4)
RBC # BLD AUTO: 4.91 MILLION/UL (ref 3.88–5.62)
SODIUM SERPL-SCNC: 137 MMOL/L (ref 135–147)
WBC # BLD AUTO: 8.69 THOUSAND/UL (ref 4.31–10.16)

## 2023-07-05 PROCEDURE — 83880 ASSAY OF NATRIURETIC PEPTIDE: CPT | Performed by: EMERGENCY MEDICINE

## 2023-07-05 PROCEDURE — 71046 X-RAY EXAM CHEST 2 VIEWS: CPT

## 2023-07-05 PROCEDURE — 84484 ASSAY OF TROPONIN QUANT: CPT | Performed by: EMERGENCY MEDICINE

## 2023-07-05 PROCEDURE — 80053 COMPREHEN METABOLIC PANEL: CPT | Performed by: EMERGENCY MEDICINE

## 2023-07-05 PROCEDURE — 36415 COLL VENOUS BLD VENIPUNCTURE: CPT | Performed by: EMERGENCY MEDICINE

## 2023-07-05 PROCEDURE — 85025 COMPLETE CBC W/AUTO DIFF WBC: CPT | Performed by: EMERGENCY MEDICINE

## 2023-07-05 RX ORDER — DOXYCYCLINE HYCLATE 100 MG/1
100 CAPSULE ORAL 2 TIMES DAILY
Qty: 20 CAPSULE | Refills: 0 | Status: SHIPPED | OUTPATIENT
Start: 2023-07-05 | End: 2023-07-15

## 2023-07-05 NOTE — Clinical Note
Maricruz Cavazos Batshevajerrod accompanied Daniele Ramirez to the emergency department on 7/5/2023. Return date if applicable: 37[birth year]/1978        If you have any questions or concerns, please don't hesitate to call.       Anamaria Russell, DO

## 2023-07-05 NOTE — ED PROVIDER NOTES
History  Chief Complaint   Patient presents with   • Nasal Congestion     Pt reports nasal congestion despite OTC medications, states they were not able to sleep all night, pt hx of COPD      Patient is a 26-year-old male who presents to the emergency room complaining of his nasal passages being "completely blocked" last evening and unable to breathe. Patient reports that he has dealt with sinus issues for many years. Patient relates a history of using multiple different inhalers for his nose. Patient reports that he uses different snoring devices to help him. Patient reports that he at times will dilute medications so that he can use them longer than they typically would be used based on the package labeling. Pt also has a history of COPD secondary to chronic tobacco use disorder. Patient reports that he is also being evaluated for congestive heart failure but he is uncertain if he has that history. Review of the records would indicate that the patient has this diagnosis of CHF. Patient reports to me that he has been "researching" his chronically "clogged" nose and believes that he has catarrah based on his research. He does report that he has follow-up with ear nose and throat scheduled. History provided by:  Patient  URI  Presenting symptoms: congestion    Presenting symptoms: no cough, no ear pain and no sore throat    Congestion:     Location:  Nasal    Interferes with sleep: yes    Severity:  Severe  Duration: Years. Timing:  Constant  Progression:  Waxing and waning  Chronicity:  Chronic  Relieved by:  Nothing  Worsened by:  Breathing  Ineffective treatments:  Decongestant, inhaler and OTC medications  Associated symptoms: no headaches, no sinus pain, no sneezing and no wheezing    Risk factors: chronic cardiac disease        Prior to Admission Medications   Prescriptions Last Dose Informant Patient Reported? Taking?    B Complex CAPS   Yes No   Sig: Take 1 capsule by mouth daily   Multiple Vitamin (Multi Vitamin Daily) TABS   Yes No   Sig: Take 1 tablet by mouth daily   Omega-3 Fatty Acids (fish oil) 1,000 mg   Yes No   Sig: Take 1,000 mg by mouth   Probiotic Product (Misc Intestinal Kajal Regulat) CAPS   Yes No   Sig: Take 1 capsule by mouth   albuterol (2.5 mg/3 mL) 0.083 % nebulizer solution   No No   Sig: Take 3 mL (2.5 mg total) by nebulization every 6 (six) hours as needed for wheezing or shortness of breath   albuterol (PROVENTIL HFA,VENTOLIN HFA) 90 mcg/act inhaler   No No   Sig: Inhale 2 puffs 4 (four) times a day   aspirin 81 mg chewable tablet   Yes No   Sig: Chew 162 mg daily   budesonide (Rhinocort Allergy) 32 MCG/ACT nasal spray   No No   Si spray into each nostril daily   cyanocobalamin (VITAMIN B-12) 500 MCG tablet   Yes No   Sig: Take 500 mcg by mouth   fluticasone (Flovent HFA) 220 mcg/act inhaler   Yes No   Sig: Inhale 2 puffs 2 (two) times a day   furosemide (LASIX) 40 mg tablet   No No   Sig: Take 1 tablet (40 mg total) by mouth 2 (two) times a day   guaiFENesin (MUCINEX) 600 mg 12 hr tablet   No No   Sig: Take 1 tablet (600 mg total) by mouth every 12 (twelve) hours   ipratropium (ATROVENT) 0.02 % nebulizer solution   No No   Sig: Take 2.5 mL (0.5 mg total) by nebulization 4 (four) times a day   ketoconazole (NIZORAL) 2 % shampoo   Yes No   lidocaine (LIDODERM) 5 %   No No   Sig: Apply 1 patch topically daily Remove & Discard patch within 12 hours or as directed by MD Do not start before 2023.    magnesium gluconate (MAGONATE) 500 mg tablet  Spouse/Significant Other Yes No   Sig: Take 133 mg by mouth 2 (two) times a day   nicotine (NICODERM CQ) 21 mg/24 hr TD 24 hr patch   No No   Sig: Place 1 patch on the skin daily   nicotine polacrilex (NICORETTE) 2 mg gum   No No   Sig: Chew 1 each (2 mg total) every 2 (two) hours as needed for smoking cessation for up to 7 days   olmesartan (BENICAR) 5 mg tablet   Yes No   Sig: Take 1 tablet by mouth daily   potassium chloride (Klor-Con) 10 mEq tablet   No No   Sig: Take 2 tablets (20 mEq total) by mouth 2 (two) times a day for 15 days   selenium 50 MCG TABS   Yes No   Sig: Take 200 mcg by mouth daily   sodium chloride (OCEAN) 0.65 % nasal spray   No No   Si spray into each nostril every hour as needed for congestion   vitamin E, tocopherol, 400 units capsule   Yes No   Sig: Take 400 Units by mouth      Facility-Administered Medications: None       Past Medical History:   Diagnosis Date   • COPD (chronic obstructive pulmonary disease) (HCC)    • HTN (hypertension)    • Hyperlipidemia    • Obesity    • Polycythemia        Past Surgical History:   Procedure Laterality Date   • ARTHROSCOPY KNEE Left    • COLONOSCOPY         Family History   Problem Relation Age of Onset   • Heart attack Mother    • Heart disease Brother      I have reviewed and agree with the history as documented. E-Cigarette/Vaping   • E-Cigarette Use Never User      E-Cigarette/Vaping Substances     Social History     Tobacco Use   • Smoking status: Former     Packs/day: 1.00     Types: Cigarettes     Quit date: 2022     Years since quittin.1   • Smokeless tobacco: Never   Vaping Use   • Vaping Use: Never used   Substance Use Topics   • Alcohol use: Not Currently   • Drug use: Not Currently       Review of Systems   Constitutional: Negative for activity change, appetite change and chills. HENT: Positive for congestion. Negative for ear discharge, ear pain, facial swelling, sinus pressure, sinus pain, sneezing and sore throat. Respiratory: Positive for shortness of breath. Negative for cough, chest tightness and wheezing. Cardiovascular: Negative. Gastrointestinal: Negative. Neurological: Negative for weakness, light-headedness and headaches. All other systems reviewed and are negative. Physical Exam  Physical Exam  Vitals and nursing note reviewed. Constitutional:       General: He is not in acute distress.      Appearance: He is obese. He is not ill-appearing or toxic-appearing. HENT:      Head: Normocephalic. Right Ear: External ear normal.      Left Ear: External ear normal.      Nose: Nose normal.      Right Nostril: No epistaxis, septal hematoma or occlusion. Left Nostril: No epistaxis, septal hematoma or occlusion. Comments: Markedly erythematous nasal passages. No active bleeding     Mouth/Throat:      Mouth: Mucous membranes are moist.      Pharynx: Oropharynx is clear. Eyes:      General:         Right eye: No discharge. Left eye: No discharge. Pupils: Pupils are equal, round, and reactive to light. Cardiovascular:      Rate and Rhythm: Normal rate. Pulses: Normal pulses. Pulmonary:      Effort: No respiratory distress. Breath sounds: No stridor. Decreased breath sounds and wheezing (Intermittent pain cleared with cough) present. No rhonchi or rales. Abdominal:      General: Abdomen is flat. Neurological:      General: No focal deficit present. Mental Status: He is alert. Psychiatric:         Thought Content:  Thought content normal.         Judgment: Judgment normal.         Vital Signs  ED Triage Vitals [07/05/23 0931]   Temperature Pulse Respirations Blood Pressure SpO2   97.6 °F (36.4 °C) 92 20 (!) 178/83 95 %      Temp Source Heart Rate Source Patient Position - Orthostatic VS BP Location FiO2 (%)   Temporal Monitor Sitting Right arm --      Pain Score       No Pain           Vitals:    07/05/23 0945 07/05/23 1000 07/05/23 1015 07/05/23 1030   BP: (!) 180/88  164/82    Pulse: 92 89 93 87   Patient Position - Orthostatic VS:             Visual Acuity      ED Medications  Medications - No data to display    Diagnostic Studies  Results Reviewed     Procedure Component Value Units Date/Time    HS Troponin I 2hr [[id number]]     Lab Status: No result Specimen: Blood     HS Troponin 0hr (reflex protocol) [[id number]]  (Normal) Collected: 07/05/23 0959    Lab Status: Final result Specimen: Blood from Arm, Left Updated: 07/05/23 1027     hs TnI 0hr 11 ng/L     B-Type Natriuretic Peptide(BNP) [[id number]]  (Normal) Collected: 07/05/23 0959    Lab Status: Final result Specimen: Blood from Arm, Left Updated: 07/05/23 1026     BNP 25 pg/mL     Comprehensive metabolic panel [[id number]]  (Abnormal) Collected: 07/05/23 0959    Lab Status: Final result Specimen: Blood from Arm, Left Updated: 07/05/23 1024     Sodium 137 mmol/L      Potassium 4.0 mmol/L      Chloride 98 mmol/L      CO2 35 mmol/L      ANION GAP 4 mmol/L      BUN 12 mg/dL      Creatinine 0.54 mg/dL      Glucose 106 mg/dL      Calcium 9.5 mg/dL      AST 30 U/L      ALT 28 U/L      Alkaline Phosphatase 51 U/L      Total Protein 7.1 g/dL      Albumin 4.0 g/dL      Total Bilirubin 0.34 mg/dL      eGFR 107 ml/min/1.73sq m     Narrative:      [place] guidelines for Chronic Kidney Disease (CKD):   •  Stage 1 with normal or high GFR (GFR > 90 mL/min/1.73 square meters)  •  Stage 2 Mild CKD (GFR = 60-89 mL/min/1.73 square meters)  •  Stage 3A Moderate CKD (GFR = 45-59 mL/min/1.73 square meters)  •  Stage 3B Moderate CKD (GFR = 30-44 mL/min/1.73 square meters)  •  Stage 4 Severe CKD (GFR = 15-29 mL/min/1.73 square meters)  •  Stage 5 End Stage CKD (GFR <15 mL/min/1.73 square meters)  Note: GFR calculation is accurate only with a steady state creatinine    CBC and differential [[id number]]  (Abnormal) Collected: 07/05/23 0959    Lab Status: Final result Specimen: Blood from Arm, Left Updated: 07/05/23 1004     WBC 8.69 Thousand/uL      RBC 4.91 Million/uL      Hemoglobin 15.0 g/dL      Hematocrit 47.6 %      MCV 97 fL      MCH 30.5 pg      MCHC 31.5 g/dL      RDW 13.2 %      MPV 9.7 fL      Platelets 463 Thousands/uL      nRBC 0 /100 WBCs      Neutrophils Relative 55 %      Immat GRANS % 0 %      Lymphocytes Relative 23 %      Monocytes Relative 17 %      Eosinophils Relative 4 %      Basophils Relative 1 % Neutrophils Absolute 4.72 Thousands/µL      Immature Grans Absolute 0.03 Thousand/uL      Lymphocytes Absolute 2.03 Thousands/µL      Monocytes Absolute 1.49 Thousand/µL      Eosinophils Absolute 0.36 Thousand/µL      Basophils Absolute 0.06 Thousands/µL                  XR chest 2 views   ED Interpretation by Catherine Hebert DO (07/05 1054)   No active disease and no change from January 2023. by Kaley Bosch MD (07/05 1058)                 Procedures  ECG 12 Lead Documentation Only    Date/Time: 7/5/2023 10:16 AM    Performed by: Catherine Hebert DO  Authorized by: Catherine Hebert DO    ECG reviewed by me, the ED Provider: yes    Patient location:  ED  Previous ECG:     Previous ECG:  Compared to current    Comparison ECG info:  Significant change from January 2, 2023 or May 31, 2022  Interpretation:     Interpretation: normal    Rate:     ECG rate assessment: normal    Rhythm:     Rhythm: sinus rhythm    Ectopy:     Ectopy: none    QRS:     QRS axis:  Normal  Conduction:     Conduction: abnormal      Abnormal conduction: complete RBBB    ST segments:     ST segments:  Non-specific  T waves:     T waves: non-specific               ED Course  ED Course as of 07/05/23 1100   Wed Jul 05, 2023   1012 Patient's nasal passageways look markedly erythematous and appear to have been irritated. It is difficult to fully evaluate these as the patient has neck mobility issues. 1012 Based on the history that the patient provided and my physical exam and the bottles of medication he brought as well as some devices that he reports is using to open up his nasal passages, I am concerned the patient may be overusing medications in his nose and I did discuss this with him  Patient may be causing rebound effect   1033 hs TnI 0hr: 11   1034 BNP: 25   1034 CO2(!): 35  Chronic finding in this patient with tobacco use disorder and COPD   1054 Patient without active disease on his chest x-ray. Labs are at baseline. Suggested steroids to patient but he declined. Reported an intolerance. Suggested a short course of antibiotics. Patient reports that he can only take doxycycline and is intolerant or allergic to several others. Patient is otherwise stable for discharge. SBIRT 20yo+    Flowsheet Row Most Recent Value   Initial Alcohol Screen: US AUDIT-C     1. How often do you have a drink containing alcohol? 0 Filed at: 07/05/2023 1044   2. How many drinks containing alcohol do you have on a typical day you are drinking? 0 Filed at: 07/05/2023 1044   3a. Male UNDER 65: How often do you have five or more drinks on one occasion? 0 Filed at: 07/05/2023 1044   3b. FEMALE Any Age, or MALE 65+: How often do you have 4 or more drinks on one occassion? 0 Filed at: 07/05/2023 1044   Audit-C Score 0 Filed at: 07/05/2023 1044   YARIEL: How many times in the past year have you. .. Used an illegal drug or used a prescription medication for non-medical reasons? Never Filed at: 07/05/2023 1044                    Medical Decision Making  Patient presented to the emergency department and a MSE was performed. The patient was evaluated for complaint related to acute shortness of breath. Patient is potentially at risk for, but not limited to, acute coronary syndrome, arrhythmia, myocardial infarction, pulmonary embolism, pneumothorax, infectious process of the lungs such as sinusitis, seasonal allergies, Bacot use disorder pneumonia, reactive airway disease, asthma, COPD exacerbation, cardiomyopathy, congestive heart failure or viral syndrome. Several of these diagnoses have been evaluated and ruled out by history and physical.  As needed, patient will be further evaluated with laboratory and imaging studies. Higher level diagnostics, such as CT imaging or ultrasound, may also be required. Please see work-up portion of the note for further evaluation of patient's risk.   Socioeconomic factors were also considered as part of the decision-making process. Unless otherwise stated in the chart or patient is admitted as elsewhere documented, any previously prescribed medications will be maintained. Chronic sinusitis: chronic illness or injury with severe exacerbation, progression, or side effects of treatment  Amount and/or Complexity of Data Reviewed  Labs: ordered. Decision-making details documented in ED Course. Radiology: ordered and independent interpretation performed. Risk  Prescription drug management. Disposition  Final diagnoses:   Chronic sinusitis   Tobacco use disorder     Time reflects when diagnosis was documented in both MDM as applicable and the Disposition within this note     Time User Action Codes Description Comment    7/5/2023 10:55 AM Doretha People Add [J32.9] Chronic sinusitis     7/5/2023 11:00 AM Doretha People Add [F17.200] Tobacco use disorder       ED Disposition     ED Disposition   Discharge    Condition   Stable    Date/Time   Wed Jul 5, 2023 10:54 AM    Comment   Walter [facility] [place] discharge to home/self care. Follow-up Information     Follow up With Specialties Details Why Contact Info    Wellington Springer MD Otolaryngology In 2 weeks Even in well [place] [address] [place] [address]  [phone]            Patient's Medications   Discharge Prescriptions    DOXYCYCLINE HYCLATE (VIBRAMYCIN) 100 MG CAPSULE    Take 1 capsule (100 mg total) by mouth 2 (two) times a day for 10 days       Start Date: 7/5/2023  End Date: 7/15/2023       Order Dose: 100 mg       Quantity: 20 capsule    Refills: 0       No discharge procedures on file.     PDMP Review     None          ED Provider  Electronically Signed by           Philip Figueroa DO  07/05/23 1100

## 2023-07-05 NOTE — DISCHARGE INSTRUCTIONS
Continue other medications that you have been previously prescribed. Take the antibiotics as prescribed. Follow-up with ear nose and throat. Please avoid overusing nasal sprays as they may cause rebound symptoms. Thank you for choosing the emergency department at [facility]. We appreciated the opportunity and privilege to address your healthcare needs. We remain available to you should you require additional evaluation or assistance. We value your feedback and would appreciate the opportunity to address anything you identified as an opportunity to improve or where we excelled. If there are colleagues who deserve special recognition, please let us know! We hope you are feeling better soon! Please also note that sometimes there are subtle abnormalities in your lab values that you may observe when you access your record online. These are frequently not worrisome and if they are of concern we will have discussed them with you. However, we always encourage that you discuss any concerns you may have or observe on your record with your primary care provider. Please also be aware that voice transcription will occasionally recognize words or grammar differently than what was spoken.

## 2023-07-17 LAB
ATRIAL RATE: 92 BPM
P AXIS: 51 DEGREES
PR INTERVAL: 214 MS
QRS AXIS: 31 DEGREES
QRSD INTERVAL: 132 MS
QT INTERVAL: 402 MS
QTC INTERVAL: 497 MS
T WAVE AXIS: 40 DEGREES
VENTRICULAR RATE: 92 BPM

## 2023-11-20 ENCOUNTER — TELEPHONE (OUTPATIENT)
Dept: UROLOGY | Facility: CLINIC | Age: 68
End: 2023-11-20

## 2023-11-20 NOTE — TELEPHONE ENCOUNTER
Pt has  PSA result. Left message for pt to call office back to schedule follow up visit. Unable to reach letter mailed to pt home.

## 2023-11-20 NOTE — LETTER
[facility] [address]  [address] [facility]  [address] [place] [address]  Phone#  [phone]  Fax#  [phone]      November 20, 2023      Dear:   Phill Ramirez         Our office has attempted to contact you several times regarding your Appointment. Could you please contact our office at [phone]. Thank you.      Sincerely,    Shea Cardoso LPN

## 2024-01-05 NOTE — ED NOTES
Patient transported to [facility], [address] [facility]  05/31/22 9838 done

## 2024-03-26 ENCOUNTER — APPOINTMENT (EMERGENCY)
Dept: RADIOLOGY | Facility: HOSPITAL | Age: 69
DRG: 291 | End: 2024-03-26
Payer: MEDICARE

## 2024-03-26 ENCOUNTER — HOSPITAL ENCOUNTER (INPATIENT)
Facility: HOSPITAL | Age: 69
LOS: 5 days | Discharge: HOME | DRG: 291 | End: 2024-03-31
Attending: STUDENT IN AN ORGANIZED HEALTH CARE EDUCATION/TRAINING PROGRAM | Admitting: FAMILY MEDICINE
Payer: MEDICARE

## 2024-03-26 DIAGNOSIS — J96.11 CHRONIC RESPIRATORY FAILURE WITH HYPOXIA AND HYPERCAPNIA (HCC): ICD-10-CM

## 2024-03-26 DIAGNOSIS — L03.116 CELLULITIS OF LEFT LOWER EXTREMITY: ICD-10-CM

## 2024-03-26 DIAGNOSIS — J96.12 CHRONIC RESPIRATORY FAILURE WITH HYPOXIA AND HYPERCAPNIA (HCC): ICD-10-CM

## 2024-03-26 DIAGNOSIS — I50.33 ACUTE ON CHRONIC DIASTOLIC HEART FAILURE (HCC): Primary | ICD-10-CM

## 2024-03-26 LAB
2HR DELTA HS TROPONIN: -2 NG/L
ALBUMIN SERPL BCP-MCNC: 3.9 G/DL (ref 3.5–5)
ALP SERPL-CCNC: 64 U/L (ref 34–104)
ALT SERPL W P-5'-P-CCNC: 29 U/L (ref 7–52)
ANION GAP SERPL CALCULATED.3IONS-SCNC: 7 MMOL/L (ref 4–13)
AST SERPL W P-5'-P-CCNC: 36 U/L (ref 13–39)
ATRIAL RATE: 93 BPM
BASOPHILS # BLD AUTO: 0.09 THOUSANDS/ÂΜL (ref 0–0.1)
BASOPHILS NFR BLD AUTO: 1 % (ref 0–1)
BILIRUB SERPL-MCNC: 0.44 MG/DL (ref 0.2–1)
BNP SERPL-MCNC: 28 PG/ML (ref 0–100)
BUN SERPL-MCNC: 13 MG/DL (ref 5–25)
CALCIUM SERPL-MCNC: 9.8 MG/DL (ref 8.4–10.2)
CARDIAC TROPONIN I PNL SERPL HS: 10 NG/L
CARDIAC TROPONIN I PNL SERPL HS: 12 NG/L
CHLORIDE SERPL-SCNC: 90 MMOL/L (ref 96–108)
CO2 SERPL-SCNC: 43 MMOL/L (ref 21–32)
CREAT SERPL-MCNC: 0.71 MG/DL (ref 0.6–1.3)
EOSINOPHIL # BLD AUTO: 0.7 THOUSAND/ÂΜL (ref 0–0.61)
EOSINOPHIL NFR BLD AUTO: 6 % (ref 0–6)
ERYTHROCYTE [DISTWIDTH] IN BLOOD BY AUTOMATED COUNT: 12.8 % (ref 11.6–15.1)
GFR SERPL CREATININE-BSD FRML MDRD: 95 ML/MIN/1.73SQ M
GLUCOSE SERPL-MCNC: 87 MG/DL (ref 65–140)
HCT VFR BLD AUTO: 44.6 % (ref 36.5–49.3)
HGB BLD-MCNC: 14 G/DL (ref 12–17)
IMM GRANULOCYTES # BLD AUTO: 0.04 THOUSAND/UL (ref 0–0.2)
IMM GRANULOCYTES NFR BLD AUTO: 0 % (ref 0–2)
LYMPHOCYTES # BLD AUTO: 1.83 THOUSANDS/ÂΜL (ref 0.6–4.47)
LYMPHOCYTES NFR BLD AUTO: 16 % (ref 14–44)
MAGNESIUM SERPL-MCNC: 1.8 MG/DL (ref 1.9–2.7)
MCH RBC QN AUTO: 29.9 PG (ref 26.8–34.3)
MCHC RBC AUTO-ENTMCNC: 31.4 G/DL (ref 31.4–37.4)
MCV RBC AUTO: 95 FL (ref 82–98)
MONOCYTES # BLD AUTO: 1.61 THOUSAND/ÂΜL (ref 0.17–1.22)
MONOCYTES NFR BLD AUTO: 14 % (ref 4–12)
NEUTROPHILS # BLD AUTO: 6.93 THOUSANDS/ÂΜL (ref 1.85–7.62)
NEUTS SEG NFR BLD AUTO: 63 % (ref 43–75)
NRBC BLD AUTO-RTO: 0 /100 WBCS
P AXIS: 46 DEGREES
PLATELET # BLD AUTO: 284 THOUSANDS/UL (ref 149–390)
PMV BLD AUTO: 9.4 FL (ref 8.9–12.7)
POTASSIUM SERPL-SCNC: 3.6 MMOL/L (ref 3.5–5.3)
PR INTERVAL: 178 MS
PROT SERPL-MCNC: 7.6 G/DL (ref 6.4–8.4)
QRS AXIS: 0 DEGREES
QRSD INTERVAL: 138 MS
QT INTERVAL: 412 MS
QTC INTERVAL: 512 MS
RBC # BLD AUTO: 4.68 MILLION/UL (ref 3.88–5.62)
SODIUM SERPL-SCNC: 140 MMOL/L (ref 135–147)
T WAVE AXIS: 14 DEGREES
VENTRICULAR RATE: 93 BPM
WBC # BLD AUTO: 11.2 THOUSAND/UL (ref 4.31–10.16)

## 2024-03-26 PROCEDURE — 84484 ASSAY OF TROPONIN QUANT: CPT | Performed by: STUDENT IN AN ORGANIZED HEALTH CARE EDUCATION/TRAINING PROGRAM

## 2024-03-26 PROCEDURE — 99223 1ST HOSP IP/OBS HIGH 75: CPT | Performed by: FAMILY MEDICINE

## 2024-03-26 PROCEDURE — 83735 ASSAY OF MAGNESIUM: CPT | Performed by: STUDENT IN AN ORGANIZED HEALTH CARE EDUCATION/TRAINING PROGRAM

## 2024-03-26 PROCEDURE — 93010 ELECTROCARDIOGRAM REPORT: CPT | Performed by: INTERNAL MEDICINE

## 2024-03-26 PROCEDURE — 99285 EMERGENCY DEPT VISIT HI MDM: CPT

## 2024-03-26 PROCEDURE — 83880 ASSAY OF NATRIURETIC PEPTIDE: CPT | Performed by: STUDENT IN AN ORGANIZED HEALTH CARE EDUCATION/TRAINING PROGRAM

## 2024-03-26 PROCEDURE — 96374 THER/PROPH/DIAG INJ IV PUSH: CPT

## 2024-03-26 PROCEDURE — 71045 X-RAY EXAM CHEST 1 VIEW: CPT

## 2024-03-26 PROCEDURE — 80053 COMPREHEN METABOLIC PANEL: CPT | Performed by: STUDENT IN AN ORGANIZED HEALTH CARE EDUCATION/TRAINING PROGRAM

## 2024-03-26 PROCEDURE — 85025 COMPLETE CBC W/AUTO DIFF WBC: CPT | Performed by: STUDENT IN AN ORGANIZED HEALTH CARE EDUCATION/TRAINING PROGRAM

## 2024-03-26 PROCEDURE — 93005 ELECTROCARDIOGRAM TRACING: CPT

## 2024-03-26 PROCEDURE — 36415 COLL VENOUS BLD VENIPUNCTURE: CPT | Performed by: STUDENT IN AN ORGANIZED HEALTH CARE EDUCATION/TRAINING PROGRAM

## 2024-03-26 PROCEDURE — 96375 TX/PRO/DX INJ NEW DRUG ADDON: CPT

## 2024-03-26 PROCEDURE — 99285 EMERGENCY DEPT VISIT HI MDM: CPT | Performed by: STUDENT IN AN ORGANIZED HEALTH CARE EDUCATION/TRAINING PROGRAM

## 2024-03-26 RX ORDER — ALBUTEROL SULFATE 2.5 MG/3ML
2.5 SOLUTION RESPIRATORY (INHALATION) EVERY 6 HOURS PRN
Status: DISCONTINUED | OUTPATIENT
Start: 2024-03-26 | End: 2024-03-31 | Stop reason: HOSPADM

## 2024-03-26 RX ORDER — FUROSEMIDE 10 MG/ML
80 INJECTION INTRAMUSCULAR; INTRAVENOUS ONCE
Status: COMPLETED | OUTPATIENT
Start: 2024-03-26 | End: 2024-03-26

## 2024-03-26 RX ORDER — ACETAMINOPHEN 325 MG/1
650 TABLET ORAL EVERY 6 HOURS PRN
Status: DISCONTINUED | OUTPATIENT
Start: 2024-03-26 | End: 2024-03-31 | Stop reason: HOSPADM

## 2024-03-26 RX ORDER — ALBUTEROL SULFATE 90 UG/1
2 AEROSOL, METERED RESPIRATORY (INHALATION) 4 TIMES DAILY
Status: DISCONTINUED | OUTPATIENT
Start: 2024-03-26 | End: 2024-03-31 | Stop reason: HOSPADM

## 2024-03-26 RX ORDER — MAGNESIUM SULFATE HEPTAHYDRATE 40 MG/ML
2 INJECTION, SOLUTION INTRAVENOUS ONCE
Status: COMPLETED | OUTPATIENT
Start: 2024-03-26 | End: 2024-03-26

## 2024-03-26 RX ORDER — FLUTICASONE PROPIONATE 50 MCG
2 SPRAY, SUSPENSION (ML) NASAL DAILY
Status: DISCONTINUED | OUTPATIENT
Start: 2024-03-27 | End: 2024-03-29

## 2024-03-26 RX ORDER — FUROSEMIDE 10 MG/ML
15 SYRINGE (ML) INJECTION CONTINUOUS
Status: DISCONTINUED | OUTPATIENT
Start: 2024-03-26 | End: 2024-03-30

## 2024-03-26 RX ORDER — POTASSIUM CHLORIDE 750 MG/1
20 TABLET, EXTENDED RELEASE ORAL 2 TIMES DAILY
Status: DISCONTINUED | OUTPATIENT
Start: 2024-03-26 | End: 2024-03-28

## 2024-03-26 RX ORDER — CEFAZOLIN SODIUM 2 G/50ML
2000 SOLUTION INTRAVENOUS EVERY 8 HOURS
Status: DISCONTINUED | OUTPATIENT
Start: 2024-03-26 | End: 2024-03-31

## 2024-03-26 RX ORDER — ECHINACEA PURPUREA EXTRACT 125 MG
1 TABLET ORAL
Status: DISCONTINUED | OUTPATIENT
Start: 2024-03-26 | End: 2024-03-31 | Stop reason: HOSPADM

## 2024-03-26 RX ORDER — FLUTICASONE FUROATE 200 UG/1
1 POWDER RESPIRATORY (INHALATION) DAILY
COMMUNITY

## 2024-03-26 RX ORDER — NICOTINE 21 MG/24HR
21 PATCH, TRANSDERMAL 24 HOURS TRANSDERMAL DAILY
Status: DISCONTINUED | OUTPATIENT
Start: 2024-03-27 | End: 2024-03-31 | Stop reason: HOSPADM

## 2024-03-26 RX ORDER — PANTOPRAZOLE SODIUM 40 MG/1
40 TABLET, DELAYED RELEASE ORAL 2 TIMES DAILY
COMMUNITY

## 2024-03-26 RX ORDER — POTASSIUM CHLORIDE 20 MEQ/1
40 TABLET, EXTENDED RELEASE ORAL
Status: COMPLETED | OUTPATIENT
Start: 2024-03-26 | End: 2024-03-26

## 2024-03-26 RX ORDER — ENOXAPARIN SODIUM 100 MG/ML
40 INJECTION SUBCUTANEOUS DAILY
Status: DISCONTINUED | OUTPATIENT
Start: 2024-03-27 | End: 2024-03-31 | Stop reason: HOSPADM

## 2024-03-26 RX ORDER — ASPIRIN 81 MG/1
162 TABLET, CHEWABLE ORAL DAILY
Status: DISCONTINUED | OUTPATIENT
Start: 2024-03-27 | End: 2024-03-27

## 2024-03-26 RX ORDER — LANOLIN ALCOHOL/MO/W.PET/CERES
3 CREAM (GRAM) TOPICAL
Status: DISCONTINUED | OUTPATIENT
Start: 2024-03-26 | End: 2024-03-31 | Stop reason: HOSPADM

## 2024-03-26 RX ORDER — VITAMIN B COMPLEX
1 CAPSULE ORAL DAILY
Status: DISCONTINUED | OUTPATIENT
Start: 2024-03-27 | End: 2024-03-26

## 2024-03-26 RX ORDER — GUAIFENESIN 600 MG/1
600 TABLET, EXTENDED RELEASE ORAL EVERY 12 HOURS SCHEDULED
Status: DISCONTINUED | OUTPATIENT
Start: 2024-03-26 | End: 2024-03-31 | Stop reason: HOSPADM

## 2024-03-26 RX ADMIN — FUROSEMIDE 80 MG: 10 INJECTION, SOLUTION INTRAVENOUS at 16:01

## 2024-03-26 RX ADMIN — ALBUTEROL SULFATE 2 PUFF: 90 AEROSOL, METERED RESPIRATORY (INHALATION) at 21:08

## 2024-03-26 RX ADMIN — CEFAZOLIN SODIUM 2000 MG: 2 SOLUTION INTRAVENOUS at 18:31

## 2024-03-26 RX ADMIN — POTASSIUM CHLORIDE 40 MEQ: 1500 TABLET, EXTENDED RELEASE ORAL at 16:55

## 2024-03-26 RX ADMIN — POTASSIUM CHLORIDE 20 MEQ: 750 TABLET, EXTENDED RELEASE ORAL at 18:17

## 2024-03-26 RX ADMIN — POTASSIUM CHLORIDE 40 MEQ: 1500 TABLET, EXTENDED RELEASE ORAL at 16:08

## 2024-03-26 RX ADMIN — Medication 10 MG/HR: at 18:10

## 2024-03-26 RX ADMIN — ALBUTEROL SULFATE 2 PUFF: 90 AEROSOL, METERED RESPIRATORY (INHALATION) at 18:18

## 2024-03-26 RX ADMIN — MAGNESIUM SULFATE HEPTAHYDRATE 2 G: 40 INJECTION, SOLUTION INTRAVENOUS at 16:04

## 2024-03-26 NOTE — ED NOTES
Avel txt sent to 3M charge RN. PT to be transported to unit, no s/s of distress, Vs stable, A&Ox4, ID band in place     Rebecca Ortiz RN  03/26/24 5345       Rebecca Ortiz RN  03/26/24 2425

## 2024-03-26 NOTE — ED PROVIDER NOTES
"History  Chief Complaint   Patient presents with    Shortness of Breath     Pt reports leg swelling for \"years\" and SOB for \"months\" patient not cooperative with triage questions on arrival       History provided by:  Patient, medical records and spouse  Shortness of Breath  Severity:  Moderate  Onset quality:  Gradual  Duration: Worsening shortness of breath and leg swelling for months.  Timing:  Constant  Progression:  Worsening  Chronicity:  Recurrent  Context comment:  Hx of COPD, CHF. On oral Lasix and home O2. Worsening shortness of breath and leg swelling despite therapy.  Relieved by:  Rest  Worsened by:  Activity, coughing, movement and exertion  Ineffective treatments: Home O2, oral Lasix.  Associated symptoms: cough    Associated symptoms: no abdominal pain, no chest pain, no claudication, no fever, no headaches, no vomiting and no wheezing    Risk factors: obesity      Prior to Admission Medications   Prescriptions Last Dose Informant Patient Reported? Taking?   B Complex CAPS   Yes No   Sig: Take 1 capsule by mouth daily   Multiple Vitamin (Multi Vitamin Daily) TABS   Yes No   Sig: Take 1 tablet by mouth daily   Omega-3 Fatty Acids (fish oil) 1,000 mg   Yes No   Sig: Take 1,000 mg by mouth   Probiotic Product (Misc Intestinal Kajal Regulat) CAPS   Yes No   Sig: Take 1 capsule by mouth   albuterol (PROVENTIL HFA,VENTOLIN HFA) 90 mcg/act inhaler   No No   Sig: Inhale 2 puffs 4 (four) times a day   aspirin 81 mg chewable tablet   Yes No   Sig: Chew 162 mg daily   budesonide (Rhinocort Allergy) 32 MCG/ACT nasal spray   No No   Si spray into each nostril daily   cyanocobalamin (VITAMIN B-12) 500 MCG tablet   Yes No   Sig: Take 500 mcg by mouth   fluticasone (Flovent HFA) 220 mcg/act inhaler   Yes No   Sig: Inhale 2 puffs 2 (two) times a day   furosemide (LASIX) 40 mg tablet   No No   Sig: Take 1 tablet (40 mg total) by mouth 2 (two) times a day   guaiFENesin (MUCINEX) 600 mg 12 hr tablet   No No   Sig: " Take 1 tablet (600 mg total) by mouth every 12 (twelve) hours   ipratropium (ATROVENT) 0.02 % nebulizer solution   No No   Sig: Take 2.5 mL (0.5 mg total) by nebulization 4 (four) times a day   ketoconazole (NIZORAL) 2 % shampoo   Yes No   lidocaine (LIDODERM) 5 %   No No   Sig: Apply 1 patch topically daily Remove & Discard patch within 12 hours or as directed by MD Do not start before 2023.   magnesium gluconate (MAGONATE) 500 mg tablet  Spouse/Significant Other Yes No   Sig: Take 133 mg by mouth 2 (two) times a day   nicotine (NICODERM CQ) 21 mg/24 hr TD 24 hr patch   No No   Sig: Place 1 patch on the skin daily   nicotine polacrilex (NICORETTE) 2 mg gum   No No   Sig: Chew 1 each (2 mg total) every 2 (two) hours as needed for smoking cessation for up to 7 days   olmesartan (BENICAR) 5 mg tablet   Yes No   Sig: Take 1 tablet by mouth daily   potassium chloride (Klor-Con) 10 mEq tablet   No No   Sig: Take 2 tablets (20 mEq total) by mouth 2 (two) times a day for 15 days   selenium 50 MCG TABS   Yes No   Sig: Take 200 mcg by mouth daily   sodium chloride (OCEAN) 0.65 % nasal spray   No No   Si spray into each nostril every hour as needed for congestion   vitamin E, tocopherol, 400 units capsule   Yes No   Sig: Take 400 Units by mouth      Facility-Administered Medications: None       Past Medical History:   Diagnosis Date    COPD (chronic obstructive pulmonary disease) (HCC)     HTN (hypertension)     Hyperlipidemia     Obesity     Polycythemia        Past Surgical History:   Procedure Laterality Date    ARTHROSCOPY KNEE Left     COLONOSCOPY         Family History   Problem Relation Age of Onset    Heart attack Mother     Heart disease Brother      I have reviewed and agree with the history as documented.    E-Cigarette/Vaping    E-Cigarette Use Never User      E-Cigarette/Vaping Substances     Social History     Tobacco Use    Smoking status: Former     Current packs/day: 0.00     Types:  Cigarettes     Quit date: 2022     Years since quittin.8    Smokeless tobacco: Never   Vaping Use    Vaping status: Never Used   Substance Use Topics    Alcohol use: Not Currently    Drug use: Not Currently     Review of Systems   Constitutional:  Negative for activity change, appetite change and fever.   Respiratory:  Positive for cough, chest tightness and shortness of breath. Negative for wheezing.    Cardiovascular:  Positive for leg swelling. Negative for chest pain, palpitations and claudication.   Gastrointestinal:  Positive for abdominal distention. Negative for abdominal pain, diarrhea, nausea and vomiting.   Neurological:  Negative for dizziness, syncope, weakness, light-headedness and headaches.   All other systems reviewed and are negative.    Physical Exam  Physical Exam  Vitals and nursing note reviewed.   Constitutional:       General: He is not in acute distress.     Appearance: He is not ill-appearing or toxic-appearing.   HENT:      Head: Normocephalic and atraumatic.   Eyes:      Extraocular Movements: Extraocular movements intact.   Cardiovascular:      Rate and Rhythm: Normal rate and regular rhythm.   Pulmonary:      Effort: Tachypnea and respiratory distress present. No accessory muscle usage.      Breath sounds: Examination of the right-upper field reveals decreased breath sounds. Examination of the left-upper field reveals decreased breath sounds. Examination of the right-middle field reveals decreased breath sounds. Examination of the left-middle field reveals decreased breath sounds. Examination of the right-lower field reveals decreased breath sounds and rales. Examination of the left-lower field reveals decreased breath sounds and rales. Decreased breath sounds and rales present. No wheezing or rhonchi.   Abdominal:      General: Bowel sounds are normal.      Palpations: Abdomen is soft.      Tenderness: There is no abdominal tenderness. There is no guarding or rebound.    Musculoskeletal:      Right lower leg: Tenderness present. Edema present.      Left lower leg: Tenderness present. Edema present.      Comments: Mild erythematous changes along the lower legs.    Skin:     Coloration: Skin is not cyanotic.      Findings: Erythema present. No ecchymosis or rash.   Neurological:      General: No focal deficit present.      Mental Status: He is alert and oriented to person, place, and time.   Psychiatric:         Mood and Affect: Mood normal.         Behavior: Behavior normal.       Vital Signs  ED Triage Vitals   Temperature Pulse Respirations Blood Pressure SpO2   03/26/24 1725 03/26/24 1506 03/26/24 1506 03/26/24 1506 03/26/24 1506   97.7 °F (36.5 °C) 99 20 149/65 93 %      Temp src Heart Rate Source Patient Position - Orthostatic VS BP Location FiO2 (%)   -- 03/26/24 1506 03/26/24 1506 03/26/24 1506 --    Monitor Sitting Left arm       Pain Score       03/26/24 1734       No Pain           Vitals:    03/26/24 1630 03/26/24 1645 03/26/24 1700 03/26/24 1725   BP: 132/65  114/55 132/67   Pulse: 102 93 93 97   Patient Position - Orthostatic VS:         ED Medications  Medications   potassium chloride (Klor-Con M20) CR tablet 40 mEq (40 mEq Oral Given 3/26/24 1655)   furosemide (LASIX) injection 80 mg (80 mg Intravenous Given 3/26/24 1601)   magnesium sulfate 2 g/50 mL IVPB (premix) 2 g (0 g Intravenous Stopped 3/26/24 1655)     Diagnostic Studies  Results Reviewed       Procedure Component Value Units Date/Time    HS Troponin I 4hr [[id number]]     Lab Status: No result Specimen: Blood     HS Troponin I 2hr [[id number]]     Lab Status: No result Specimen: Blood     HS Troponin 0hr (reflex protocol) [[id number]]  (Normal) Collected: 03/26/24 1525    Lab Status: Final result Specimen: Blood from Arm, Right Updated: 03/26/24 1557     hs TnI 0hr 12 ng/L     B-Type Natriuretic Peptide(BNP) [[id number]]  (Normal) Collected: 03/26/24 1525    Lab Status: Final result Specimen: Blood from Arm,  Right Updated: 03/26/24 1557     BNP 28 pg/mL     Comprehensive metabolic panel [[id number]]  (Abnormal) Collected: 03/26/24 1525    Lab Status: Final result Specimen: Blood from Arm, Right Updated: 03/26/24 1550     Sodium 140 mmol/L      Potassium 3.6 mmol/L      Chloride 90 mmol/L      CO2 43 mmol/L      ANION GAP 7 mmol/L      BUN 13 mg/dL      Creatinine 0.71 mg/dL      Glucose 87 mg/dL      Calcium 9.8 mg/dL      AST 36 U/L      ALT 29 U/L      Alkaline Phosphatase 64 U/L      Total Protein 7.6 g/dL      Albumin 3.9 g/dL      Total Bilirubin 0.44 mg/dL      eGFR 95 ml/min/1.73sq m     Narrative:      [employer] guidelines for Chronic Kidney Disease (CKD):     Stage 1 with normal or high GFR (GFR > 90 mL/min/1.73 square meters)    Stage 2 Mild CKD (GFR = 60-89 mL/min/1.73 square meters)    Stage 3A Moderate CKD (GFR = 45-59 mL/min/1.73 square meters)    Stage 3B Moderate CKD (GFR = 30-44 mL/min/1.73 square meters)    Stage 4 Severe CKD (GFR = 15-29 mL/min/1.73 square meters)    Stage 5 End Stage CKD (GFR <15 mL/min/1.73 square meters)  Note: GFR calculation is accurate only with a steady state creatinine    Magnesium [[id number]]  (Abnormal) Collected: 03/26/24 1525    Lab Status: Final result Specimen: Blood from Arm, Right Updated: 03/26/24 1550     Magnesium 1.8 mg/dL     CBC and differential [[id number]]  (Abnormal) Collected: 03/26/24 1525    Lab Status: Final result Specimen: Blood from Arm, Right Updated: 03/26/24 1530     WBC 11.20 Thousand/uL      RBC 4.68 Million/uL      Hemoglobin 14.0 g/dL      Hematocrit 44.6 %      MCV 95 fL      MCH 29.9 pg      MCHC 31.4 g/dL      RDW 12.8 %      MPV 9.4 fL      Platelets 284 Thousands/uL      nRBC 0 /100 WBCs      Neutrophils Relative 63 %      Immature Grans % 0 %      Lymphocytes Relative 16 %      Monocytes Relative 14 %      Eosinophils Relative 6 %      Basophils Relative 1 %      Neutrophils Absolute 6.93 Thousands/µL      Absolute  Immature Grans 0.04 Thousand/uL      Absolute Lymphocytes 1.83 Thousands/µL      Absolute Monocytes 1.61 Thousand/µL      Eosinophils Absolute 0.70 Thousand/µL      Basophils Absolute 0.09 Thousands/µL                XR chest 1 view portable   ED Interpretation by Castillo Orr DO (03/26 1552)   Atelectatic changes, blunting of the left costophrenic angle. No focal infiltrates.              Procedures  ECG 12 Lead Documentation Only    Date/Time: 3/26/2024 1:14 PM    Performed by: Castillo Orr DO  Authorized by: Castillo Orr DO    Indications / Diagnosis:  Shortness of breath  Patient location:  ED  Previous ECG:     Previous ECG:  Compared to current    Comparison ECG info:  July 2023    Similarity:  No change  Interpretation:     Interpretation: non-specific    Rate:     ECG rate:  93    ECG rate assessment: normal    Rhythm:     Rhythm: sinus rhythm    Ectopy:     Ectopy: none    QRS:     QRS axis:  Normal    QRS intervals:  Wide  Conduction:     Conduction: abnormal      Abnormal conduction: complete RBBB    ST segments:     ST segments:  Normal  T waves:     T waves: normal         ED Course  ED Course as of 03/26/24 1744   Tue Mar 26, 2024   1513 Vital signs reviewed. Hx of COPD, HFpEF. On Lasix 80 mg BID. States that he does not smoke. Worsening exertional dyspnea, bilateral extremity edema, orthopnea.  Questionable lower extremity cellulitis.  Will obtain basic labs, CXR. Will likely require IV diuresis.    1552 CXR interpreted by me. No acute findings.  Atelectatic changes, blunting of the left costophrenic angle.  Will administer a dose of IV Lasix and replace potassium. Hx and clinical findings consistent with acute on chronic diastolic heart failure.    1557 [facility] contacted for admission.    1616 Admission accepted by Dr. Torres ([facility] medicine).      Medical Decision Making  This patient presents with lower extremity edema, shortness of breath.   Diagnostic considerations include CHF  exacerbation, COPD exacerbation, pneumonia, cellulitis, ACS, PE. See ED Course.         Problems Addressed:  Acute on chronic diastolic heart failure (HCC): chronic illness or injury with exacerbation, progression, or side effects of treatment that poses a threat to life or bodily functions  Chronic respiratory failure with hypoxia and hypercapnia (HCC): chronic illness or injury    Amount and/or Complexity of Data Reviewed  External Data Reviewed: ECG and notes.     Details: ECG, cardiology documentation reviewed.  Labs: ordered. Decision-making details documented in ED Course.  Radiology: ordered and independent interpretation performed. Decision-making details documented in ED Course.  ECG/medicine tests: ordered and independent interpretation performed. Decision-making details documented in ED Course.    Risk  Prescription drug management.  Decision regarding hospitalization.             Disposition  Final diagnoses:   Acute on chronic diastolic heart failure (HCC)   Chronic respiratory failure with hypoxia and hypercapnia (HCC)     Time reflects when diagnosis was documented in both MDM as applicable and the Disposition within this note       Time User Action Codes Description Comment    3/26/2024  4:14 PM Castillo Orr [I50.33] Acute on chronic diastolic heart failure (HCC)     3/26/2024  4:15 PM Castillo Orr Add [J96.11,  J96.12] Chronic respiratory failure with hypoxia and hypercapnia (HCC)     3/26/2024  4:15 PM Castillo Orr Add [J44.9] COPD without exacerbation (HCC)     3/26/2024  4:16 PM Castillo Orr Remove [J44.9] COPD without exacerbation (HCC)           ED Disposition       ED Disposition   Admit    Condition   Stable    Date/Time   Tue Mar 26, 2024  4:16 PM    Comment   Case was discussed with Dr. Torres and the patient's admission status was agreed to be Admission Status: inpatient status to the service of Dr. Torres .               Follow-up Information    None         Current  Discharge Medication List        CONTINUE these medications which have NOT CHANGED    Details   albuterol (PROVENTIL HFA,VENTOLIN HFA) 90 mcg/act inhaler Inhale 2 puffs 4 (four) times a day  Qty: 18 g, Refills: 0    Comments: Substitution to a formulary equivalent within the same pharmaceutical class is authorized.  Associated Diagnoses: Acute on chronic respiratory failure with hypoxia and hypercapnia (HCC); COPD exacerbation (HCC)      aspirin 81 mg chewable tablet Chew 162 mg daily      B Complex CAPS Take 1 capsule by mouth daily      budesonide (Rhinocort Allergy) 32 MCG/ACT nasal spray 1 spray into each nostril daily  Qty: 5 mL, Refills: 0    Associated Diagnoses: Allergic rhinitis, unspecified seasonality, unspecified trigger      cyanocobalamin (VITAMIN B-12) 500 MCG tablet Take 500 mcg by mouth      fluticasone (Flovent HFA) 220 mcg/act inhaler Inhale 2 puffs 2 (two) times a day      furosemide (LASIX) 40 mg tablet Take 1 tablet (40 mg total) by mouth 2 (two) times a day  Qty: 60 tablet, Refills: 0    Associated Diagnoses: Acute on chronic respiratory failure with hypoxia and hypercapnia (HCC)      guaiFENesin (MUCINEX) 600 mg 12 hr tablet Take 1 tablet (600 mg total) by mouth every 12 (twelve) hours  Qty: 30 tablet, Refills: 0    Associated Diagnoses: COPD, severe (HCC)      ipratropium (ATROVENT) 0.02 % nebulizer solution Take 2.5 mL (0.5 mg total) by nebulization 4 (four) times a day  Qty: 300 mL, Refills: 0    Associated Diagnoses: COPD exacerbation (HCC)      ketoconazole (NIZORAL) 2 % shampoo       lidocaine (LIDODERM) 5 % Apply 1 patch topically daily Remove & Discard patch within 12 hours or as directed by MD Do not start before January 6, 2023.  Qty: 15 patch, Refills: 0    Associated Diagnoses: Cervicalgia      magnesium gluconate (MAGONATE) 500 mg tablet Take 133 mg by mouth 2 (two) times a day      Multiple Vitamin (Multi Vitamin Daily) TABS Take 1 tablet by mouth daily      nicotine (NICODERM  CQ) 21 mg/24 hr TD 24 hr patch Place 1 patch on the skin daily  Qty: 28 patch, Refills: 0    Associated Diagnoses: Acute on chronic respiratory failure with hypoxia and hypercapnia (HCC); COPD exacerbation (HCC)      nicotine polacrilex (NICORETTE) 2 mg gum Chew 1 each (2 mg total) every 2 (two) hours as needed for smoking cessation for up to 7 days  Qty: 100 each, Refills: 0    Associated Diagnoses: Tobacco abuse      olmesartan (BENICAR) 5 mg tablet Take 1 tablet by mouth daily      Omega-3 Fatty Acids (fish oil) 1,000 mg Take 1,000 mg by mouth      potassium chloride (Klor-Con) 10 mEq tablet Take 2 tablets (20 mEq total) by mouth 2 (two) times a day for 15 days  Qty: 60 tablet, Refills: 0    Associated Diagnoses: Acute on chronic congestive heart failure, unspecified heart failure type (HCC)      Probiotic Product (Misc Intestinal Kajal Regulat) CAPS Take 1 capsule by mouth      selenium 50 MCG TABS Take 200 mcg by mouth daily      sodium chloride (OCEAN) 0.65 % nasal spray 1 spray into each nostril every hour as needed for congestion  Qty: 15 mL, Refills: 0    Associated Diagnoses: Allergic rhinitis, unspecified seasonality, unspecified trigger      vitamin E, tocopherol, 400 units capsule Take 400 Units by mouth           STOP taking these medications       albuterol (2.5 mg/3 mL) 0.083 % nebulizer solution Comments:   Reason for Stopping:               No discharge procedures on file.    PDMP Review       None            ED Provider  Electronically Signed by             Castillo Orr DO  03/26/24 2148

## 2024-03-26 NOTE — H&P
[facility], [facility]  H&P  Name: Tommie Ramirez 68 y.o. male I MRN: [id number]  Unit/Bed#: ED 01 I Date of Admission: 3/26/2024   Date of Service: 3/26/2024 I Hospital Day: 0      Assessment/Plan   * Acute on chronic diastolic (congestive) heart failure (HCC)  Assessment & Plan  Wt Readings from Last 3 Encounters:   03/26/24 129 kg (283 lb 11.7 oz)   07/05/23 133 kg (292 lb 15.9 oz)   01/05/23 131 kg (289 lb 0.4 oz)     Patient states that he has been incrementally increasing his Lasix at home and was up to 80 mg twice daily for the last few weeks but still having increasing redness swelling of his legs  he tried torsemide in the past but it gave him kidney pain so he does not want to use that.  He states he is very swollen right now swollen all the way up to his upper thighs.  Received a dose of IV Lasix in the ED will place on Lasix drip at 10 mg/h with telemetry monitor electrolytes closely received IV magnesium supplementation and also placed on oral potassium supplementation.  Check 2 d echo evaluate LV function          Cellulitis of left lower extremity  Assessment & Plan  Patient states that he was recently started on antibiotics by his family doctor due to infection of his legs.  Noted to have bilateral lower extremity cellulitis probably exacerbated secondary to swelling of lower extremities.  Will place on IV Ancef for now and continue diuresis and observe    COPD, severe (HCC)  Assessment & Plan  Not in exacerbation continue home meds    Chronic respiratory failure with hypoxia (HCC)  Assessment & Plan  states that he uses 4 L of oxygen at baseline 24/7 secondary to his CHF and COPD    Tobacco abuse  Assessment & Plan  Smoking cessation and placed on nicotine replacement therapy is currently smoking around 1 pack/day and trying to gradually cut back    Essential hypertension  Assessment & Plan  Blood Pressures currently well-controlled hold Benicar for now as he is getting active  diuresis      VTE Prophylaxis: Enoxaparin (Lovenox)  / sequential compression device   Code Status: full code  POLST: There is no POLST form on file for this patient (pre-hospital)  Discussion with family: dw caregiver at bedside    Anticipated Length of Stay:  Patient will be admitted on an Inpatient basis with an anticipated length of stay of  more than 2 midnights.   Justification for Hospital Stay: Acute diastolic heart failure    Total Time for Visit, including Counseling / Coordination of Care: 90 minutes.  Greater than 50% of this total time spent on direct patient counseling and coordination of care.    Chief Complaint:   Leg swelling    History of Present Illness:    Tommie Ramirez is a 68 y.o. male who presents with worsening lower extremity swelling.  Patient states that last few weeks he has been gradually increasing his Lasix and is now up to 80 mg twice daily and despite that he has leg swelling and also some pinkish-reddish discoloration of his skin and concern for cellulitis and was started on antibiotics by family doctor recently.  He states that he chronically uses 4 L of oxygen all the time still using the same but he feels he is gets more winded and also the swelling is bothering him.  Denies any fevers or chills or chest pain.  States he watches the amount of salt he eats and also is still smoking    Review of Systems:    Review of Systems   Constitutional:  Positive for appetite change and fatigue. Negative for chills and fever.   HENT:  Negative for hearing loss, sore throat and trouble swallowing.    Eyes:  Negative for photophobia, discharge and visual disturbance.   Respiratory:  Positive for shortness of breath. Negative for chest tightness.    Cardiovascular:  Positive for leg swelling. Negative for chest pain and palpitations.   Gastrointestinal:  Negative for abdominal pain, blood in stool and vomiting.   Endocrine: Negative for polydipsia and polyuria.   Genitourinary:  Negative  for difficulty urinating, dysuria, flank pain and hematuria.   Musculoskeletal:  Negative for back pain and gait problem.   Skin:  Positive for color change. Negative for rash.   Allergic/Immunologic: Negative for environmental allergies and food allergies.   Neurological:  Negative for dizziness, seizures, syncope and headaches.   Hematological:  Does not bruise/bleed easily.   Psychiatric/Behavioral:  Negative for behavioral problems.    All other systems reviewed and are negative.      Past Medical and Surgical History:     Past Medical History:   Diagnosis Date    COPD (chronic obstructive pulmonary disease) (HCC)     HTN (hypertension)     Hyperlipidemia     Obesity     Polycythemia        Past Surgical History:   Procedure Laterality Date    ARTHROSCOPY KNEE Left 2002    COLONOSCOPY         Meds/Allergies:    Prior to Admission medications    Medication Sig Start Date End Date Taking? Authorizing Provider   albuterol (2.5 mg/3 mL) 0.083 % nebulizer solution Take 3 mL (2.5 mg total) by nebulization every 6 (six) hours as needed for wheezing or shortness of breath 1/5/23   Kar Martinez MD   albuterol (PROVENTIL HFA,VENTOLIN HFA) 90 mcg/act inhaler Inhale 2 puffs 4 (four) times a day 6/3/22   Charisma Torres MD   aspirin 81 mg chewable tablet Chew 162 mg daily    Historical Provider, MD   B Complex CAPS Take 1 capsule by mouth daily    Historical Provider, MD   budesonide (Rhinocort Allergy) 32 MCG/ACT nasal spray 1 spray into each nostril daily 1/5/23   Kar Martinez MD   cyanocobalamin (VITAMIN B-12) 500 MCG tablet Take 500 mcg by mouth    Historical Provider, MD   fluticasone (Flovent HFA) 220 mcg/act inhaler Inhale 2 puffs 2 (two) times a day 3/15/22   Historical Provider, MD   furosemide (LASIX) 40 mg tablet Take 1 tablet (40 mg total) by mouth 2 (two) times a day 1/5/23 2/4/23  Kar Martinez MD   guaiFENesin (MUCINEX) 600 mg 12 hr tablet Take 1 tablet (600 mg total) by mouth every 12 (twelve)  hours 1/5/23   Kar Martinez MD   ipratropium (ATROVENT) 0.02 % nebulizer solution Take 2.5 mL (0.5 mg total) by nebulization 4 (four) times a day 1/5/23 2/4/23  Kar Martinez MD   ketoconazole (NIZORAL) 2 % shampoo  6/7/22   Historical Provider, MD   lidocaine (LIDODERM) 5 % Apply 1 patch topically daily Remove & Discard patch within 12 hours or as directed by MD Do not start before January 6, 2023. 1/6/23   Kar Martinez MD   magnesium gluconate (MAGONATE) 500 mg tablet Take 133 mg by mouth 2 (two) times a day    Historical Provider, MD   Multiple Vitamin (Multi Vitamin Daily) TABS Take 1 tablet by mouth daily    Historical Provider, MD   nicotine (NICODERM CQ) 21 mg/24 hr TD 24 hr patch Place 1 patch on the skin daily 6/4/22   Charisma Torres MD   nicotine polacrilex (NICORETTE) 2 mg gum Chew 1 each (2 mg total) every 2 (two) hours as needed for smoking cessation for up to 7 days 1/5/23 1/12/23  Kar Martinez MD   olmesartan (BENICAR) 5 mg tablet Take 1 tablet by mouth daily 12/29/21   Historical Provider, MD   Omega-3 Fatty Acids (fish oil) 1,000 mg Take 1,000 mg by mouth    Historical Provider, MD   potassium chloride (Klor-Con) 10 mEq tablet Take 2 tablets (20 mEq total) by mouth 2 (two) times a day for 15 days 1/5/23 1/20/23  Kar Martinez MD   Probiotic Product (Misc Intestinal Kajal Regulat) CAPS Take 1 capsule by mouth    Historical Provider, MD   selenium 50 MCG TABS Take 200 mcg by mouth daily    Historical Provider, MD   sodium chloride (OCEAN) 0.65 % nasal spray 1 spray into each nostril every hour as needed for congestion 1/5/23   Kar Martinez MD   vitamin E, tocopherol, 400 units capsule Take 400 Units by mouth    Historical Provider, MD     I have reviewed home medications with patient personally.    Allergies:   Allergies   Allergen Reactions    Other Anaphylaxis     bees    Lansoprazole Rash     Rash       Clonidine Other (See Comments)    Codeine Nausea Only and Vomiting      n/v  pill form only; cough medicine okay      Levaquin [Levofloxacin] Hives    Lisinopril Cough     cough      Oxycodone-Acetaminophen Headache     headache    Prednisone Other (See Comments)     unfriendly    Statins Myalgia and Other (See Comments)     Elevated bp  elevates bp      Sulfa Antibiotics Other (See Comments)     ?    Ampicillin Rash     Rash      Azithromycin Hives and Rash    Ranitidine Rash     Rash forehead, pepcid         Social History:     Marital Status: /Civil Union     Social History     Substance and Sexual Activity   Alcohol Use Not Currently     Social History     Tobacco Use   Smoking Status Former    Current packs/day: 0.00    Types: Cigarettes    Quit date: 2022    Years since quittin.8   Smokeless Tobacco Never     Social History     Substance and Sexual Activity   Drug Use Not Currently       Family History:    Family History   Problem Relation Age of Onset    Heart attack Mother     Heart disease Brother        Physical Exam:     Vitals:   Blood Pressure: 132/65 (24 1630)  Pulse: 93 (24 1645)  Respirations: 20 (24 1630)  Weight - Scale: 129 kg (283 lb 11.7 oz) (24 1506)  SpO2: 94 % (24 1645)    Physical Exam  Vitals and nursing note reviewed.   Constitutional:       Appearance: He is ill-appearing.   HENT:      Head: Normocephalic and atraumatic.      Right Ear: External ear normal.      Left Ear: External ear normal.      Nose: Nose normal.      Mouth/Throat:      Pharynx: Oropharynx is clear.   Cardiovascular:      Rate and Rhythm: Normal rate and regular rhythm.      Heart sounds: Normal heart sounds.   Pulmonary:      Effort: Pulmonary effort is normal.      Comments: Moderate air entry bilaterally with mild decreased breath sounds bilateral bases  Abdominal:      General: Bowel sounds are normal.      Palpations: Abdomen is soft.      Tenderness: There is no abdominal tenderness.   Musculoskeletal:         General: Normal range of  motion.      Cervical back: Normal range of motion and neck supple.      Right lower leg: Edema present.      Left lower leg: Edema present.      Comments: 2+ nonpitting edema bilateral extremity up to the mid thigh with erythema noted of bilateral lower extremity including the feet and lower shin   Skin:     General: Skin is warm and dry.      Capillary Refill: Capillary refill takes less than 2 seconds.   Neurological:      General: No focal deficit present.      Mental Status: He is alert and oriented to person, place, and time.   Psychiatric:         Mood and Affect: Mood normal.           Additional Data:     Lab Results: I have personally reviewed pertinent reports.      Results from last 7 days   Lab Units 03/26/24  1525   WBC Thousand/uL 11.20*   HEMOGLOBIN g/dL 14.0   HEMATOCRIT % 44.6   PLATELETS Thousands/uL 284   NEUTROS PCT % 63   LYMPHS PCT % 16   MONOS PCT % 14*   EOS PCT % 6     Results from last 7 days   Lab Units 03/26/24  1525   SODIUM mmol/L 140   POTASSIUM mmol/L 3.6   CHLORIDE mmol/L 90*   CO2 mmol/L 43*   BUN mg/dL 13   CREATININE mg/dL 0.71   ANION GAP mmol/L 7   CALCIUM mg/dL 9.8   ALBUMIN g/dL 3.9   TOTAL BILIRUBIN mg/dL 0.44   ALK PHOS U/L 64   ALT U/L 29   AST U/L 36   GLUCOSE RANDOM mg/dL 87                       Imaging: I have personally reviewed pertinent reports.      XR chest 1 view portable   ED Interpretation by Castillo Orr DO (03/26 [id number])   Atelectatic changes, blunting of the left costophrenic angle. No focal infiltrates.           EKG, Pathology, and Other Studies Reviewed on Admission:   EKG: Sinus rhythm with right bundle branch block    Allscripts / Epic Records Reviewed: Yes     ** Please Note: This note has been constructed using a voice recognition system. **

## 2024-03-26 NOTE — ASSESSMENT & PLAN NOTE
Wt Readings from Last 3 Encounters:   03/26/24 129 kg (283 lb 11.7 oz)   07/05/23 133 kg (292 lb 15.9 oz)   01/05/23 131 kg (289 lb 0.4 oz)     Patient states that he has been incrementally increasing his Lasix at home and was up to 80 mg twice daily for the last few weeks but still having increasing redness swelling of his legs  he tried torsemide in the past but it gave him kidney pain so he does not want to use that.  He states he is very swollen right now swollen all the way up to his upper thighs.  Received a dose of IV Lasix in the ED will place on Lasix drip at 10 mg/h with telemetry monitor electrolytes closely received IV magnesium supplementation and also placed on oral potassium supplementation.  Check 2 d echo evaluate LV function

## 2024-03-26 NOTE — PLAN OF CARE
Problem: PAIN - ADULT  Goal: Verbalizes/displays adequate comfort level or baseline comfort level  Description: Interventions:  - Encourage patient to monitor pain and request assistance  - Assess pain using appropriate pain scale  - Administer analgesics based on type and severity of pain and evaluate response  - Implement non-pharmacological measures as appropriate and evaluate response  - Consider cultural and social influences on pain and pain management  - Notify physician/advanced practitioner if interventions unsuccessful or patient reports new pain  Outcome: Progressing     Problem: INFECTION - ADULT  Goal: Absence or prevention of progression during hospitalization  Description: INTERVENTIONS:  - Assess and monitor for signs and symptoms of infection  - Monitor lab/diagnostic results  - Monitor all insertion sites, i.e. indwelling lines, tubes, and drains  - Monitor endotracheal if appropriate and nasal secretions for changes in amount and color  - [place] appropriate cooling/warming therapies per order  - Administer medications as ordered  - Instruct and encourage patient and family to use good hand hygiene technique  - Identify and instruct in appropriate isolation precautions for identified infection/condition  Outcome: Progressing     Problem: SAFETY ADULT  Goal: Patient will remain free of falls  Description: INTERVENTIONS:  - Educate patient/family on patient safety including physical limitations  - Instruct patient to call for assistance with activity   - Consult OT/PT to assist with strengthening/mobility   - Keep Call bell within reach  - Keep bed low and locked with side rails adjusted as appropriate  - Keep care items and personal belongings within reach  - Initiate and maintain comfort rounds  - Make Fall Risk Sign visible to staff  - Offer Toileting every 2 Hours, in advance of need  - Initiate/Maintain bed alarm  - Obtain necessary fall risk management equipment: bed check  - Apply yellow  socks and bracelet for high fall risk patients  - Consider moving patient to room near nurses station  Outcome: Progressing  Goal: Maintain or return to baseline ADL function  Description: INTERVENTIONS:  -  Assess patient's ability to carry out ADLs; assess patient's baseline for ADL function and identify physical deficits which impact ability to perform ADLs (bathing, care of mouth/teeth, toileting, grooming, dressing, etc.)  - Assess/evaluate cause of self-care deficits   - Assess range of motion  - Assess patient's mobility; develop plan if impaired  - Assess patient's need for assistive devices and provide as appropriate  - Encourage maximum independence but intervene and supervise when necessary  - Involve family in performance of ADLs  - Assess for home care needs following discharge   - Consider OT consult to assist with ADL evaluation and planning for discharge  - Provide patient education as appropriate  Outcome: Progressing  Goal: Maintains/Returns to pre admission functional level  Description: INTERVENTIONS:  - Perform AM-PAC 6 Click Basic Mobility/ Daily Activity assessment daily.  - Set and communicate daily mobility goal to care team and patient/family/caregiver.   - Collaborate with rehabilitation services on mobility goals if consulted    - Reposition patient every 2 hours.    - Out of bed for toileting  - Record patient progress and toleration of activity level   Outcome: Progressing     Problem: DISCHARGE PLANNING  Goal: Discharge to home or other facility with appropriate resources  Description: INTERVENTIONS:  - Identify barriers to discharge w/patient and caregiver  - Arrange for needed discharge resources and transportation as appropriate  - Identify discharge learning needs (meds, wound care, etc.)  - Arrange for interpretive services to assist at discharge as needed  - Refer to Case Management Department for coordinating discharge planning if the patient needs post-hospital services based  on physician/advanced practitioner order or complex needs related to functional status, cognitive ability, or social support system  Outcome: Progressing     Problem: Knowledge Deficit  Goal: Patient/family/caregiver demonstrates understanding of disease process, treatment plan, medications, and discharge instructions  Description: Complete learning assessment and assess knowledge base.  Interventions:  - Provide teaching at level of understanding  - Provide teaching via preferred learning methods  Outcome: Progressing     Problem: CARDIOVASCULAR - ADULT  Goal: Maintains optimal cardiac output and hemodynamic stability  Description: INTERVENTIONS:  - Monitor I/O, vital signs and rhythm  - Monitor for S/S and trends of decreased cardiac output  - Administer and titrate ordered vasoactive medications to optimize hemodynamic stability  - Assess quality of pulses, skin color and temperature  - Assess for signs of decreased coronary artery perfusion  - Instruct patient to report change in severity of symptoms  Outcome: Progressing  Goal: Absence of cardiac dysrhythmias or at baseline rhythm  Description: INTERVENTIONS:  - Continuous cardiac monitoring, vital signs, obtain 12 lead EKG if ordered  - Administer antiarrhythmic and heart rate control medications as ordered  - Monitor electrolytes and administer replacement therapy as ordered  Outcome: Progressing     Problem: RESPIRATORY - ADULT  Goal: Achieves optimal ventilation and oxygenation  Description: INTERVENTIONS:  - Assess for changes in respiratory status  - Assess for changes in mentation and behavior  - Position to facilitate oxygenation and minimize respiratory effort  - Oxygen administered by appropriate delivery if ordered  - Initiate smoking cessation education as indicated  - Encourage broncho-pulmonary hygiene including cough, deep breathe, Incentive Spirometry  - Assess the need for suctioning and aspirate as needed  - Assess and instruct to report SOB  or any respiratory difficulty  - Respiratory Therapy support as indicated  Outcome: Progressing     Problem: MOBILITY - ADULT  Goal: Maintain or return to baseline ADL function  Description: INTERVENTIONS:  -  Assess patient's ability to carry out ADLs; assess patient's baseline for ADL function and identify physical deficits which impact ability to perform ADLs (bathing, care of mouth/teeth, toileting, grooming, dressing, etc.)  - Assess/evaluate cause of self-care deficits   - Assess range of motion  - Assess patient's mobility; develop plan if impaired  - Assess patient's need for assistive devices and provide as appropriate  - Encourage maximum independence but intervene and supervise when necessary  - Involve family in performance of ADLs  - Assess for home care needs following discharge   - Consider OT consult to assist with ADL evaluation and planning for discharge  - Provide patient education as appropriate  Outcome: Progressing  Goal: Maintains/Returns to pre admission functional level  Description: INTERVENTIONS:  - Perform AM-PAC 6 Click Basic Mobility/ Daily Activity assessment daily.  - Set and communicate daily mobility goal to care team and patient/family/caregiver.   - Collaborate with rehabilitation services on mobility goals if consulted    - Reposition patient every 2 hours.    - Out of bed for toileting  - Record patient progress and toleration of activity level   Outcome: Progressing     Problem: Potential for Falls  Goal: Patient will remain free of falls  Description: INTERVENTIONS:  - Educate patient/family on patient safety including physical limitations  - Instruct patient to call for assistance with activity   - Consult OT/PT to assist with strengthening/mobility   - Keep Call bell within reach  - Keep bed low and locked with side rails adjusted as appropriate  - Keep care items and personal belongings within reach  - Initiate and maintain comfort rounds  - Make Fall Risk Sign visible to  staff  - Offer Toileting every 2 Hours, in advance of need  - Initiate/Maintain bed alarm  - Obtain necessary fall risk management equipment: bed check  - Apply yellow socks and bracelet for high fall risk patients  - Consider moving patient to room near nurses station  Outcome: Progressing

## 2024-03-26 NOTE — ASSESSMENT & PLAN NOTE
Smoking cessation and placed on nicotine replacement therapy is currently smoking around 1 pack/day and trying to gradually cut back

## 2024-03-26 NOTE — ASSESSMENT & PLAN NOTE
Patient states that he was recently started on antibiotics by his family doctor due to infection of his legs.  Noted to have bilateral lower extremity cellulitis probably exacerbated secondary to swelling of lower extremities.  Will place on IV Ancef for now and continue diuresis and observe

## 2024-03-27 ENCOUNTER — APPOINTMENT (INPATIENT)
Dept: NON INVASIVE DIAGNOSTICS | Facility: HOSPITAL | Age: 69
DRG: 291 | End: 2024-03-27
Payer: MEDICARE

## 2024-03-27 LAB
ANION GAP SERPL CALCULATED.3IONS-SCNC: 7 MMOL/L (ref 4–13)
ANION GAP SERPL CALCULATED.3IONS-SCNC: 9 MMOL/L (ref 4–13)
BUN SERPL-MCNC: 12 MG/DL (ref 5–25)
BUN SERPL-MCNC: 13 MG/DL (ref 5–25)
CALCIUM SERPL-MCNC: 9.2 MG/DL (ref 8.4–10.2)
CALCIUM SERPL-MCNC: 9.4 MG/DL (ref 8.4–10.2)
CHLORIDE SERPL-SCNC: 90 MMOL/L (ref 96–108)
CHLORIDE SERPL-SCNC: 91 MMOL/L (ref 96–108)
CO2 SERPL-SCNC: 40 MMOL/L (ref 21–32)
CO2 SERPL-SCNC: 42 MMOL/L (ref 21–32)
CREAT SERPL-MCNC: 0.67 MG/DL (ref 0.6–1.3)
CREAT SERPL-MCNC: 0.68 MG/DL (ref 0.6–1.3)
ERYTHROCYTE [DISTWIDTH] IN BLOOD BY AUTOMATED COUNT: 12.8 % (ref 11.6–15.1)
GFR SERPL CREATININE-BSD FRML MDRD: 97 ML/MIN/1.73SQ M
GFR SERPL CREATININE-BSD FRML MDRD: 98 ML/MIN/1.73SQ M
GLUCOSE SERPL-MCNC: 104 MG/DL (ref 65–140)
GLUCOSE SERPL-MCNC: 108 MG/DL (ref 65–140)
HCT VFR BLD AUTO: 42.6 % (ref 36.5–49.3)
HGB BLD-MCNC: 13.5 G/DL (ref 12–17)
MCH RBC QN AUTO: 30.1 PG (ref 26.8–34.3)
MCHC RBC AUTO-ENTMCNC: 31.7 G/DL (ref 31.4–37.4)
MCV RBC AUTO: 95 FL (ref 82–98)
PLATELET # BLD AUTO: 268 THOUSANDS/UL (ref 149–390)
PMV BLD AUTO: 9.1 FL (ref 8.9–12.7)
POTASSIUM SERPL-SCNC: 3.5 MMOL/L (ref 3.5–5.3)
POTASSIUM SERPL-SCNC: 3.5 MMOL/L (ref 3.5–5.3)
PROCALCITONIN SERPL-MCNC: 0.07 NG/ML
RBC # BLD AUTO: 4.49 MILLION/UL (ref 3.88–5.62)
SODIUM SERPL-SCNC: 139 MMOL/L (ref 135–147)
SODIUM SERPL-SCNC: 140 MMOL/L (ref 135–147)
TSH SERPL DL<=0.05 MIU/L-ACNC: 5.73 UIU/ML (ref 0.45–4.5)
WBC # BLD AUTO: 10.75 THOUSAND/UL (ref 4.31–10.16)

## 2024-03-27 PROCEDURE — 84443 ASSAY THYROID STIM HORMONE: CPT | Performed by: FAMILY MEDICINE

## 2024-03-27 PROCEDURE — 97163 PT EVAL HIGH COMPLEX 45 MIN: CPT

## 2024-03-27 PROCEDURE — 99223 1ST HOSP IP/OBS HIGH 75: CPT | Performed by: INTERNAL MEDICINE

## 2024-03-27 PROCEDURE — 99233 SBSQ HOSP IP/OBS HIGH 50: CPT | Performed by: FAMILY MEDICINE

## 2024-03-27 PROCEDURE — 97167 OT EVAL HIGH COMPLEX 60 MIN: CPT

## 2024-03-27 PROCEDURE — 80048 BASIC METABOLIC PNL TOTAL CA: CPT | Performed by: FAMILY MEDICINE

## 2024-03-27 PROCEDURE — 84145 PROCALCITONIN (PCT): CPT | Performed by: FAMILY MEDICINE

## 2024-03-27 PROCEDURE — 85027 COMPLETE CBC AUTOMATED: CPT | Performed by: FAMILY MEDICINE

## 2024-03-27 RX ORDER — ASPIRIN 81 MG/1
81 TABLET, CHEWABLE ORAL 2 TIMES DAILY
Status: DISCONTINUED | OUTPATIENT
Start: 2024-03-27 | End: 2024-03-31 | Stop reason: HOSPADM

## 2024-03-27 RX ADMIN — NICOTINE 21 MG: 21 PATCH, EXTENDED RELEASE TRANSDERMAL at 08:50

## 2024-03-27 RX ADMIN — ASPIRIN 81 MG 81 MG: 81 TABLET ORAL at 23:43

## 2024-03-27 RX ADMIN — CYANOCOBALAMIN TAB 500 MCG 500 MCG: 500 TAB at 08:50

## 2024-03-27 RX ADMIN — Medication 15 MG/HR: at 21:05

## 2024-03-27 RX ADMIN — ALBUTEROL SULFATE 2 PUFF: 90 AEROSOL, METERED RESPIRATORY (INHALATION) at 11:59

## 2024-03-27 RX ADMIN — FLUTICASONE FUROATE 1 PUFF: 200 POWDER RESPIRATORY (INHALATION) at 08:52

## 2024-03-27 RX ADMIN — ALBUTEROL SULFATE 2 PUFF: 90 AEROSOL, METERED RESPIRATORY (INHALATION) at 17:44

## 2024-03-27 RX ADMIN — CEFAZOLIN SODIUM 2000 MG: 2 SOLUTION INTRAVENOUS at 00:05

## 2024-03-27 RX ADMIN — POTASSIUM CHLORIDE 20 MEQ: 750 TABLET, EXTENDED RELEASE ORAL at 17:44

## 2024-03-27 RX ADMIN — ASPIRIN 81 MG 81 MG: 81 TABLET ORAL at 08:50

## 2024-03-27 RX ADMIN — ALBUTEROL SULFATE 2 PUFF: 90 AEROSOL, METERED RESPIRATORY (INHALATION) at 21:04

## 2024-03-27 RX ADMIN — POTASSIUM CHLORIDE 20 MEQ: 750 TABLET, EXTENDED RELEASE ORAL at 08:50

## 2024-03-27 RX ADMIN — FLUTICASONE PROPIONATE 2 SPRAY: 50 SPRAY, METERED NASAL at 08:52

## 2024-03-27 RX ADMIN — ACETAMINOPHEN 325MG 650 MG: 325 TABLET ORAL at 09:23

## 2024-03-27 RX ADMIN — Medication 1 TABLET: at 08:52

## 2024-03-27 RX ADMIN — CEFAZOLIN SODIUM 2000 MG: 2 SOLUTION INTRAVENOUS at 08:50

## 2024-03-27 RX ADMIN — ALBUTEROL SULFATE 2 PUFF: 90 AEROSOL, METERED RESPIRATORY (INHALATION) at 08:52

## 2024-03-27 RX ADMIN — CEFAZOLIN SODIUM 2000 MG: 2 SOLUTION INTRAVENOUS at 17:44

## 2024-03-27 NOTE — ASSESSMENT & PLAN NOTE
Secondary to severe COPD, HFpEF, obesity  Will monitor with diuresis as O2 requirement remains slightly above baseline at 5 L NC today.

## 2024-03-27 NOTE — NURSING NOTE
Patient's significant other in the room, and was noted to be adjusting his oxygen. Also patient insists that when he gets coffee, that it is boiling hot.

## 2024-03-27 NOTE — ASSESSMENT & PLAN NOTE
states that he uses 4 L of oxygen at baseline 24/7 secondary to his CHF and COPD  Currently at baseline but feels more sob than his baseline.sleeps in recliner chair

## 2024-03-27 NOTE — ASSESSMENT & PLAN NOTE
Management as per primary team and respiratory.  He does appear to have thick mucus this morning and I have requested respiratory to evaluate.

## 2024-03-27 NOTE — ASSESSMENT & PLAN NOTE
Wt Readings from Last 3 Encounters:   03/27/24 126 kg (277 lb)   07/05/23 133 kg (292 lb 15.9 oz)   01/05/23 131 kg (289 lb 0.4 oz)     Patient states that he has been incrementally increasing his Lasix at home and was up to 80 mg twice daily for the last few weeks but still having increasing redness swelling of his legs  he tried torsemide in the past but it gave him kidney pain so he does not want to use that.  He states he is very swollen right now swollen all the way up to his upper thighs.  Received a dose of IV Lasix in the ED and placed on Lasix drip at 10 mg/h and increase to 15 mg/hr with telemetry monitor electrolytes .also placed on oral potassium supplementation.  States that he is not diuresing as much as he should be will probably need higher dose of Lasix drip if diuresis does not improve and electrolytes remained stable  Check 2 d echo evaluate LV function

## 2024-03-27 NOTE — PLAN OF CARE
Problem: PAIN - ADULT  Goal: Verbalizes/displays adequate comfort level or baseline comfort level  Description: Interventions:  - Encourage patient to monitor pain and request assistance  - Assess pain using appropriate pain scale  - Administer analgesics based on type and severity of pain and evaluate response  - Implement non-pharmacological measures as appropriate and evaluate response  - Consider cultural and social influences on pain and pain management  - Notify physician/advanced practitioner if interventions unsuccessful or patient reports new pain  Outcome: Progressing     Problem: INFECTION - ADULT  Goal: Absence or prevention of progression during hospitalization  Description: INTERVENTIONS:  - Assess and monitor for signs and symptoms of infection  - Monitor lab/diagnostic results  - Monitor all insertion sites, i.e. indwelling lines, tubes, and drains  - Monitor endotracheal if appropriate and nasal secretions for changes in amount and color  - [place] appropriate cooling/warming therapies per order  - Administer medications as ordered  - Instruct and encourage patient and family to use good hand hygiene technique  - Identify and instruct in appropriate isolation precautions for identified infection/condition  Outcome: Progressing     Problem: SAFETY ADULT  Goal: Patient will remain free of falls  Description: INTERVENTIONS:  - Educate patient/family on patient safety including physical limitations  - Instruct patient to call for assistance with activity   - Consult OT/PT to assist with strengthening/mobility   - Keep Call bell within reach  - Keep bed low and locked with side rails adjusted as appropriate  - Keep care items and personal belongings within reach  - Initiate and maintain comfort rounds  - Make Fall Risk Sign visible to staff    - Apply yellow socks and bracelet for high fall risk patients  - Consider moving patient to room near nurses station  Outcome: Progressing  Goal: Maintain or  return to baseline ADL function  Description: INTERVENTIONS:  -  Assess patient's ability to carry out ADLs; assess patient's baseline for ADL function and identify physical deficits which impact ability to perform ADLs (bathing, care of mouth/teeth, toileting, grooming, dressing, etc.)  - Assess/evaluate cause of self-care deficits   - Assess range of motion  - Assess patient's mobility; develop plan if impaired  - Assess patient's need for assistive devices and provide as appropriate  - Encourage maximum independence but intervene and supervise when necessary  - Involve family in performance of ADLs  - Assess for home care needs following discharge   - Consider OT consult to assist with ADL evaluation and planning for discharge  - Provide patient education as appropriate  Outcome: Progressing  Goal: Maintains/Returns to pre admission functional level  Description: INTERVENTIONS:  - Perform AM-PAC 6 Click Basic Mobility/ Daily Activity assessment daily.  - Set and communicate daily mobility goal to care team and patient/family/caregiver.   - Collaborate with rehabilitation services on mobility goals if consulted    - Out of bed for toileting  - Record patient progress and toleration of activity level   Outcome: Progressing     Problem: DISCHARGE PLANNING  Goal: Discharge to home or other facility with appropriate resources  Description: INTERVENTIONS:  - Identify barriers to discharge w/patient and caregiver  - Arrange for needed discharge resources and transportation as appropriate  - Identify discharge learning needs (meds, wound care, etc.)  - Arrange for interpretive services to assist at discharge as needed  - Refer to Case Management Department for coordinating discharge planning if the patient needs post-hospital services based on physician/advanced practitioner order or complex needs related to functional status, cognitive ability, or social support system  Outcome: Progressing     Problem: Knowledge  Deficit  Goal: Patient/family/caregiver demonstrates understanding of disease process, treatment plan, medications, and discharge instructions  Description: Complete learning assessment and assess knowledge base.  Interventions:  - Provide teaching at level of understanding  - Provide teaching via preferred learning methods  Outcome: Progressing     Problem: CARDIOVASCULAR - ADULT  Goal: Maintains optimal cardiac output and hemodynamic stability  Description: INTERVENTIONS:  - Monitor I/O, vital signs and rhythm  - Monitor for S/S and trends of decreased cardiac output  - Administer and titrate ordered vasoactive medications to optimize hemodynamic stability  - Assess quality of pulses, skin color and temperature  - Assess for signs of decreased coronary artery perfusion  - Instruct patient to report change in severity of symptoms  Outcome: Progressing  Goal: Absence of cardiac dysrhythmias or at baseline rhythm  Description: INTERVENTIONS:  - Continuous cardiac monitoring, vital signs, obtain 12 lead EKG if ordered  - Administer antiarrhythmic and heart rate control medications as ordered  - Monitor electrolytes and administer replacement therapy as ordered  Outcome: Progressing     Problem: RESPIRATORY - ADULT  Goal: Achieves optimal ventilation and oxygenation  Description: INTERVENTIONS:  - Assess for changes in respiratory status  - Assess for changes in mentation and behavior  - Position to facilitate oxygenation and minimize respiratory effort  - Oxygen administered by appropriate delivery if ordered  - Initiate smoking cessation education as indicated  - Encourage broncho-pulmonary hygiene including cough, deep breathe, Incentive Spirometry  - Assess the need for suctioning and aspirate as needed  - Assess and instruct to report SOB or any respiratory difficulty  - Respiratory Therapy support as indicated  Outcome: Progressing     Problem: MOBILITY - ADULT  Goal: Maintain or return to baseline ADL  function  Description: INTERVENTIONS:  -  Assess patient's ability to carry out ADLs; assess patient's baseline for ADL function and identify physical deficits which impact ability to perform ADLs (bathing, care of mouth/teeth, toileting, grooming, dressing, etc.)  - Assess/evaluate cause of self-care deficits   - Assess range of motion  - Assess patient's mobility; develop plan if impaired  - Assess patient's need for assistive devices and provide as appropriate  - Encourage maximum independence but intervene and supervise when necessary  - Involve family in performance of ADLs  - Assess for home care needs following discharge   - Consider OT consult to assist with ADL evaluation and planning for discharge  - Provide patient education as appropriate  Outcome: Progressing  Goal: Maintains/Returns to pre admission functional level  Description: INTERVENTIONS:  - Perform AM-PAC 6 Click Basic Mobility/ Daily Activity assessment daily.  - Set and communicate daily mobility goal to care team and patient/family/caregiver.   - Collaborate with rehabilitation services on mobility goals if consulted    - Out of bed for toileting  - Record patient progress and toleration of activity level   Outcome: Progressing     Problem: Potential for Falls  Goal: Patient will remain free of falls  Description: INTERVENTIONS:  - Educate patient/family on patient safety including physical limitations  - Instruct patient to call for assistance with activity   - Consult OT/PT to assist with strengthening/mobility   - Keep Call bell within reach  - Keep bed low and locked with side rails adjusted as appropriate  - Keep care items and personal belongings within reach  - Initiate and maintain comfort rounds  - Make Fall Risk Sign visible to staff    - Apply yellow socks and bracelet for high fall risk patients  - Consider moving patient to room near nurses station  Outcome: Progressing

## 2024-03-27 NOTE — PROGRESS NOTES
[facility]  Progress Note  Name: Tommie Ramirez I  MRN: [id number]  Unit/Bed#: MS 326Fab I Date of Admission: 3/26/2024   Date of Service: 3/27/2024 I Hospital Day: 1    Assessment/Plan   * Acute on chronic diastolic (congestive) heart failure (HCC)  Assessment & Plan  Wt Readings from Last 3 Encounters:   03/27/24 126 kg (277 lb)   07/05/23 133 kg (292 lb 15.9 oz)   01/05/23 131 kg (289 lb 0.4 oz)     Patient states that he has been incrementally increasing his Lasix at home and was up to 80 mg twice daily for the last few weeks but still having increasing redness swelling of his legs  he tried torsemide in the past but it gave him kidney pain so he does not want to use that.  He states he is very swollen right now swollen all the way up to his upper thighs.  Received a dose of IV Lasix in the ED and placed on Lasix drip at 10 mg/h and increase to 15 mg/hr with telemetry monitor electrolytes .also placed on oral potassium supplementation.  States that he is not diuresing as much as he should be will probably need higher dose of Lasix drip if diuresis does not improve and electrolytes remained stable  Check 2 d echo evaluate LV function          Cellulitis of left lower extremity  Assessment & Plan  Patient states that he was recently started on antibiotics by his family doctor due to infection of his legs.  Noted to have bilateral lower extremity cellulitis probably exacerbated secondary to swelling of lower extremities.  Will place on IV Ancef for now and continue diuresis and observe  Venous doppler b/l le to r/o dvt    COPD, severe (HCC)  Assessment & Plan  Not in exacerbation continue home meds    Chronic respiratory failure with hypoxia (HCC)  Assessment & Plan  states that he uses 4 L of oxygen at baseline 24/7 secondary to his CHF and COPD  Currently at baseline but feels more sob than his baseline.sleeps in recliner chair    Tobacco abuse  Assessment & Plan  Smoking  cessation and placed on nicotine replacement therapy is currently smoking around 1 pack/day and trying to gradually cut back    Essential hypertension  Assessment & Plan  Blood Pressures currently well-controlled hold Benicar for now as he is getting active diuresis               VTE Pharmacologic Prophylaxis:   Moderate Risk (Score 3-4) - Pharmacological DVT Prophylaxis Ordered: enoxaparin (Lovenox).    Mobility:   Basic Mobility Inpatient Raw Score: 19  JH-HLM Goal: 6: Walk 10 steps or more  JH-HLM Achieved: 6: Walk 10 steps or more  JH-HLM Goal achieved. Continue to encourage appropriate mobility.    Patient Centered Rounds: I performed bedside rounds with nursing staff today.   Discussions with Specialists or Other Care Team Provider: ra cardio    Education and Discussions with Family / Patient:     Total Time Spent on Date of Encounter in care of patient: 45 mins. This time was spent on one or more of the following: performing physical exam; counseling and coordination of care; obtaining or reviewing history; documenting in the medical record; reviewing/ordering tests, medications or procedures; communicating with other healthcare professionals and discussing with patient's family/caregivers.    Current Length of Stay: 1 day(s)  Current Patient Status: Inpatient   Certification Statement: The patient will continue to require additional inpatient hospital stay due to chf  Discharge Plan: Anticipate discharge in 48-72 hrs to home.    Code Status: Level 1 - Full Code    Subjective:   Complains of increasing swelling in his legs and still feels quite short of breath unable to get comfortable is sitting up in the recliner chair with his legs down.  He states he has not slept in the bed for almost 3 months secondary to the swelling and difficulty breathing if he lays flat    Objective:     Vitals:   Temp (24hrs), Av.5 °F (36.4 °C), Min:97.2 °F (36.2 °C), Max:97.7 °F (36.5 °C)    Temp:  [97.2 °F (36.2 °C)-97.7 °F  (36.5 °C)] 97.2 °F (36.2 °C)  HR:  [] 83  Resp:  [15-22] 22  BP: (114-149)/(55-67) 131/66  SpO2:  [83 %-95 %] 93 %  Body mass index is 38.63 kg/m².     Input and Output Summary (last 24 hours):     Intake/Output Summary (Last 24 hours) at 3/27/2024 1028  Last data filed at 3/27/2024 0952  Gross per 24 hour   Intake 1950 ml   Output 3450 ml   Net -1500 ml       Physical Exam:   Physical Exam  Vitals and nursing note reviewed.   Constitutional:       Appearance: Normal appearance.   HENT:      Head: Normocephalic and atraumatic.      Right Ear: External ear normal.      Left Ear: External ear normal.      Nose: Nose normal.      Mouth/Throat:      Pharynx: Oropharynx is clear.   Cardiovascular:      Rate and Rhythm: Normal rate and regular rhythm.      Heart sounds: Normal heart sounds.   Pulmonary:      Effort: Pulmonary effort is normal.      Comments: Moderate air entry bilaterally with decreased breath sounds bilateral bases  Abdominal:      General: Bowel sounds are normal.      Palpations: Abdomen is soft.      Tenderness: There is no abdominal tenderness.   Musculoskeletal:         General: Normal range of motion.      Cervical back: Normal range of motion and neck supple.      Right lower leg: Edema present.      Left lower leg: Edema present.   Skin:     General: Skin is warm and dry.      Capillary Refill: Capillary refill takes less than 2 seconds.   Neurological:      General: No focal deficit present.      Mental Status: He is alert and oriented to person, place, and time.   Psychiatric:         Mood and Affect: Mood normal.            Additional Data:     Labs:  Results from last 7 days   Lab Units 03/27/24  0503 03/26/24  1525   WBC Thousand/uL 10.75* 11.20*   HEMOGLOBIN g/dL 13.5 14.0   HEMATOCRIT % 42.6 44.6   PLATELETS Thousands/uL 268 284   NEUTROS PCT %  --  63   LYMPHS PCT %  --  16   MONOS PCT %  --  14*   EOS PCT %  --  6     Results from last 7 days   Lab Units 03/27/24  0503  03/26/24  1525   SODIUM mmol/L 140 140   POTASSIUM mmol/L 3.5 3.6   CHLORIDE mmol/L 91* 90*   CO2 mmol/L 40* 43*   BUN mg/dL 13 13   CREATININE mg/dL 0.67 0.71   ANION GAP mmol/L 9 7   CALCIUM mg/dL 9.2 9.8   ALBUMIN g/dL  --  3.9   TOTAL BILIRUBIN mg/dL  --  0.44   ALK PHOS U/L  --  64   ALT U/L  --  29   AST U/L  --  36   GLUCOSE RANDOM mg/dL 108 87                 Results from last 7 days   Lab Units 03/27/24  0503   PROCALCITONIN ng/ml 0.07       Lines/Drains:  Invasive Devices       Peripheral Intravenous Line  Duration             Peripheral IV 03/26/24 Dorsal (posterior);Right Hand <1 day    Peripheral IV 03/26/24 Right Antecubital <1 day                      Telemetry:  Telemetry Orders (From admission, onward)               24 Hour Telemetry Monitoring  Continuous x 24 Hours (Telem)        Question:  Reason for 24 Hour Telemetry  Answer:  Arrhythmias requiring acute medical intervention / PPM or ICD malfunction                     Telemetry Reviewed: Normal Sinus Rhythm  Indication for Continued Telemetry Use: Acute CHF on >200 mg lasix/day or equivalent dose or with new reduced EF.              Imaging: Reviewed radiology reports from this admission including: chest xray    Recent Cultures (last 7 days):         Last 24 Hours Medication List:   Current Facility-Administered Medications   Medication Dose Route Frequency Provider Last Rate    acetaminophen  650 mg Oral Q6H PRN Charisma Torres MD      albuterol  2 puff Inhalation 4x Daily Charisma Torres MD      albuterol  2.5 mg Nebulization Q6H PRN Charisma Torres MD      aspirin  81 mg Oral BID Charisma Torres MD      cefazolin  2,000 mg Intravenous Q8H Charisma Torres MD 2,000 mg (03/27/24 0850)    cyanocobalamin  500 mcg Oral Daily Charisma Torres MD      enoxaparin  40 mg Subcutaneous Daily Charisma Torres MD      fluticasone  1 puff Inhalation Daily Charisma Torres MD      fluticasone  2 spray Each Nare Daily Charisma Torres MD      furosemide  15 mg/hr Intravenous Continuous  Charisma Torres MD 15 mg/hr (03/27/24 0917)    guaiFENesin  600 mg Oral Q12H CHICHO Charisma Torres MD      melatonin  3 mg Oral HS Carroll Beckford DO      multivitamin stress formula  1 tablet Oral Daily Charisma Trores MD      nicotine  21 mg Transdermal Daily Charisma Torres MD      potassium chloride  20 mEq Oral BID Charisma Torres MD      sodium chloride  1 spray Each Nare Q1H PRN Charisma Torres MD          Today, Patient Was Seen By: Charisma Torres MD    **Please Note: This note may have been constructed using a voice recognition system.**

## 2024-03-27 NOTE — PLAN OF CARE
Problem: PHYSICAL THERAPY ADULT  Goal: Performs mobility at highest level of function for planned discharge setting.  See evaluation for individualized goals.  Description: Treatment/Interventions: Functional transfer training, LE strengthening/ROM, Elevations, Therapeutic exercise, Endurance training, Patient/family training, Gait training, Equipment eval/education, Bed mobility, Compensatory technique education, OT, Spoke to case management          See flowsheet documentation for full assessment, interventions and recommendations.  Note: Prognosis: Fair  Problem List: Decreased strength, Decreased endurance, Impaired balance, Decreased mobility, Obesity, Impaired judgement, Decreased safety awareness  Assessment: Pt is a 68 y.o. male seen for PT evaluation s/p admission to [facility] on 3/26/2024 with Acute on chronic diastolic (congestive) heart failure (HCC).  Order placed for PT services.  Upon evaluation: Pt is presenting with impaired functional mobility due to decreased strength, decreased endurance, impaired balance, fall risk, and LE edema requiring  SPV assistance for transfers. Pt's clinical presentation is currently unstable/unpredictable given the functional mobility deficits above, especially decreased activity tolerance, decreased functional mobility tolerance, and SOB upon exertion, coupled with fall risks as indicated by AM-PAC 6-Clicks: 17/24 as well as obesity and combined with medical complications of abnormal WBCs, increased O2 needs from baseline, abnormal CO2 values, and need for input for mobility technique/safety.  Pt's PMHx and comorbidities that may affect physical performance and progress include: CHF, COPD, HTN, and obesity. Personal factors affecting pt at time of IE include: hx of non-compliance, step(s) to enter environment, behavioral pattern, inability to perform ADLs, inability to ambulate household distances, limited insight into impairments, and decreased  initiation and engagement. Pt will benefit from continued skilled PT services to address deficits as defined above and to maximize level of functional mobility to facilitate return toward PLOF and improved QOL. From PT/mobility standpoint, recommendation at time of d/c would be Level II (Moderate Resource Intensity pending progress in order to reduce fall risk and maximize pt's functional independence and consistency with mobility in order to facilitate return to PLOF.  Recommend trial with cane next 1-2 sessions, ther ex next 1-2 sessions, and stair navigation .  Barriers to Discharge: Other (Comment)  Barriers to Discharge Comments: YOSVANY, impaired activity tolerance/endurance  Rehab Resource Intensity Level, PT: II (Moderate Resource Intensity)    See flowsheet documentation for full assessment.

## 2024-03-27 NOTE — PHYSICAL THERAPY NOTE
PHYSICAL THERAPY EVALUATION    NAME:  Tommie Ramirez    DATE: 03/27/24    AGE:   68 y.o.  Mrn:   [id number]  ADMIT DX:  Acute on chronic diastolic heart failure (HCC) [I50.33]  SOB (shortness of breath) [R06.02]  Chronic respiratory failure with hypoxia and hypercapnia (HCC) [J96.11, J96.12]    Past Medical History:   Diagnosis Date    COPD (chronic obstructive pulmonary disease) (HCC)     Diastolic heart failure (HCC)     HTN (hypertension)     Hyperlipidemia     Obesity     Polycythemia      Length Of Stay: 1  Performed at least 2 patient identifiers during session: Name and ID [email or web address]                               PHYSICAL THERAPY EVALUATION:      03/27/24 1131   PT Last Visit   PT Visit Date 03/27/24   Note Type   Note type Evaluation   Pain Assessment   Pain Assessment Tool FLACC   Pain Rating: FLACC (Rest) - Face 0   Pain Rating: FLACC (Rest) - Legs 0   Pain Rating: FLACC (Rest) - Activity 0   Pain Rating: FLACC (Rest) - Cry 0   Pain Rating: FLACC (Rest) - Consolability 0   Score: FLACC (Rest) 0   Pain Rating: FLACC (Activity) - Face 0   Pain Rating: FLACC (Activity) - Legs 0   Pain Rating: FLACC (Activity) - Activity 0   Pain Rating: FLACC (Activity) - Cry 0   Pain Rating: FLACC (Activity) - Consolability 0   Score: FLACC (Activity) 0   Restrictions/Precautions   Weight Bearing Precautions Per Order No   Other Precautions O2;Fall Risk;Multiple lines  (5L O2 with SpO2 at 92%)   Home Living   Type of Home House   Home Layout One level;Performs ADLs on one level;Able to live on main level with bedroom/bathroom;Stairs to enter with rails  (5 YOSVANY with rail)   Home Equipment Cane  (O2)   Additional Comments Pt unwilling to provide home living environment details.  Info that is obtained is from spouse present in pt's room.   Prior Function   Level of [place] Needs assistance with ADLs;Needs assistance with functional mobility;Needs assistance with IADLS   Lives With Spouse   Receives Help From Other  "(Comment)  (spouse)   IADLs Family/Friend/Other provides transportation;Family/Friend/Other provides meals;Family/Friend/Other provides medication management   Falls in the last 6 months 0   Comments Ambulates with cane with spouse's assistance.  Unable to obtain specifics due to pt's irritability.   General   Additional Pertinent History Pt with extensive PMHx including:  COPD, HTN, obesity, tobacco abuse, CHF, chronic respiratory failure   Family/Caregiver Present Yes   Cognition   Arousal/Participation Uncooperative   Comments pt irritabe, unwilling to make eye contact or look up at therapist to hold conversation   Subjective   Subjective \"I just need to get my breathing better, don't you know anything?\"   RLE Assessment   RLE Assessment   (unable to obtain formal MMT assessment due to pt's irritability)   Bed Mobility   Additional Comments Pt OOB in recliner chair upon PT's arrival to room.  Pt reporting he is \"unable to look up due to having a neck injury\" - does not further elaborate.   Transfers   Sit to Stand 5  Supervision   Additional items   (using tray table for support as pt declines using any AD)   Stand to Sit 5  Supervision   Stand pivot Unable to assess   Additional items   (pt unwilling)   Ambulation/Elevation   Gait pattern   (pt takes 3 side-steps to L and R using tray table as AD)   Gait Assistance 5  Supervision   Additional items   (unwilling to ambulate in room)   Stair Management Assistance Not tested   Additional items   (pt declines)   Balance   Static Sitting Good   Dynamic Sitting Fair +   Static Standing Fair -   Endurance Deficit   Endurance Deficit Yes   Endurance Deficit Description reporting significant SOB with SpO2 at 87-90% during activity while on 5L O2   Activity Tolerance   Activity Tolerance Patient limited by fatigue;Other (Comment)  (ANTON, irritability)   Medical Staff Made Aware OT Roxana   Assessment   Prognosis Fair   Problem List Decreased strength;Decreased " endurance;Impaired balance;Decreased mobility;Obesity;Impaired judgement;Decreased safety awareness   Barriers to Discharge Other (Comment)   Barriers to Discharge Comments YOSVANY, impaired activity tolerance/endurance   Goals   STG Expiration Date 04/10/24   PT Treatment Day 0   Plan   Treatment/Interventions Functional transfer training;LE strengthening/ROM;Elevations;Therapeutic exercise;Endurance training;Patient/family training;Gait training;Equipment eval/education;Bed mobility;Compensatory technique education;OT;Spoke to case management   PT Frequency 1-2x/wk   Discharge Recommendation   Rehab Resource Intensity Level, PT II (Moderate Resource Intensity)   Additional Comments pt unwilling to fully participate in PT's evaluation/assessment however has YOSVANY the home & appears to be deconditioned   AM-PAC Basic Mobility Inpatient   Turning in Flat Bed Without Bedrails 3   Lying on Back to Sitting on Edge of Flat Bed Without Bedrails 3   Moving Bed to Chair 3   Standing Up From Chair Using Arms 3   Walk in Room 3   Climb 3-5 Stairs With Railing 2   Basic Mobility Inpatient Raw Score 17   Basic Mobility Standardized Score 39.67   [facility] Highest Level Of Mobility   -HLM Goal 5: Stand one or more mins   -HLM Achieved 5: Stand (1 or more minutes)   End of Consult   Patient Position at End of Consult Bedside chair  (spouse present)   End of Consult Comments pt asking PT to leave the room, stating he is unable to do anything at this time.  PT attempts to provide pt education on benefits of activity/mobility/therapy however pt quickly dismisses the education     (Please find full objective findings from PT assessment regarding body systems outlined above).     Assessment: Pt is a 68 y.o. male seen for PT evaluation s/p admission to [facility] on 3/26/2024 with Acute on chronic diastolic (congestive) heart failure (HCC).  Order placed for PT services.  Upon evaluation: Pt is presenting with  impaired functional mobility due to decreased strength, decreased endurance, impaired balance, fall risk, and LE edema requiring  SPV assistance for transfers. Pt's clinical presentation is currently unstable/unpredictable given the functional mobility deficits above, especially decreased activity tolerance, decreased functional mobility tolerance, and SOB upon exertion, coupled with fall risks as indicated by AM-PAC 6-Clicks: 17/24 as well as obesity and combined with medical complications of abnormal WBCs, increased O2 needs from baseline, abnormal CO2 values, and need for input for mobility technique/safety.  Pt's PMHx and comorbidities that may affect physical performance and progress include: CHF, COPD, HTN, and obesity. Personal factors affecting pt at time of IE include: hx of non-compliance, step(s) to enter environment, behavioral pattern, inability to perform ADLs, inability to ambulate household distances, limited insight into impairments, and decreased initiation and engagement. Pt will benefit from continued skilled PT services to address deficits as defined above and to maximize level of functional mobility to facilitate return toward PLOF and improved QOL. From PT/mobility standpoint, recommendation at time of d/c would be Level II (Moderate Resource Intensity pending progress in order to reduce fall risk and maximize pt's functional independence and consistency with mobility in order to facilitate return to PLOF.  Recommend trial with cane next 1-2 sessions, ther ex next 1-2 sessions, and stair navigation .     The patient's -[facility] Basic Mobility Inpatient Short Form Raw Score is 17. A Raw score of greater than 16 suggests the patient may benefit from discharge to home. Please also refer to the recommendation of the Physical Therapist for safe discharge planning.       Goals: Pt will: Perform bed mobility tasks with modified I to reposition in bed and prepare for transfers. Pt will perform transfers  with modified I to decrease burden of care and prepare for ambulation. Pt will ambulate with cane for >/= 50 ft with  Supervision  to decrease risk for falls and improve activity tolerance and to access home environment. Pt will complete >/= 5 steps with with unilateral handrail with no more than min A to return to home with YOSVANY. Pt will participate in objective balance assessment to determine baseline fall risk.        Saul Jenkins, PT,DPT

## 2024-03-27 NOTE — OCCUPATIONAL THERAPY NOTE
Occupational Therapy Evaluation     Patient Name: Tommie Ramirez  Today's Date: 3/27/2024  Problem List  Principal Problem:    Acute on chronic diastolic (congestive) heart failure (HCC)  Active Problems:    COPD, severe (HCC)    Essential hypertension    Tobacco abuse    Chronic respiratory failure with hypoxia (HCC)    Cellulitis of left lower extremity    Past Medical History  Past Medical History:   Diagnosis Date    COPD (chronic obstructive pulmonary disease) (HCC)     Diastolic heart failure (HCC)     HTN (hypertension)     Hyperlipidemia     Obesity     Polycythemia      Past Surgical History  Past Surgical History:   Procedure Laterality Date    ARTHROSCOPY KNEE Left 2002    COLONOSCOPY          03/27/24 1132   Note Type   Note type Evaluation   Pain Assessment   Pain Assessment Tool FLACC   Pain Rating: FLACC (Rest) - Face 0   Pain Rating: FLACC (Rest) - Legs 0   Pain Rating: FLACC (Rest) - Activity 0   Pain Rating: FLACC (Rest) - Cry 0   Pain Rating: FLACC (Rest) - Consolability 0   Score: FLACC (Rest) 0   Pain Rating: FLACC (Activity) - Face 0   Pain Rating: FLACC (Activity) - Legs 0   Pain Rating: FLACC (Activity) - Activity 0   Pain Rating: FLACC (Activity) - Cry 0   Pain Rating: FLACC (Activity) - Consolability 0   Score: FLACC (Activity) 0   Restrictions/Precautions   Other Precautions O2;Fall Risk;Multiple lines  (5 L O2 NC with spO2 reading 92% upon arrival to room)   Home Living   Type of Home House  (~5 YOSVANY with handrail)   Home Layout One level;Performs ADLs on one level;Able to live on main level with bedroom/bathroom   Home Equipment Cane   Additional Comments Pt very resistant to evaluation, unwilling to provide history. Spoke with spouse, providing home setup. Pt lives with spouse in a 1SH with ~5 YOSVANY with a rail. Pt primarily uses cane for functional mobility.   Prior Function   Level of [place] Needs assistance with ADLs;Needs assistance with functional mobility;Needs assistance  with IADLS   Lives With Spouse   IADLs Family/Friend/Other provides transportation;Family/Friend/Other provides meals;Family/Friend/Other provides medication management   Falls in the last 6 months 0   Comments PTA, pt had assistance for ADLs, IADLs, and functional mobility. Pt refers to his spouse as his caregiver, spoke with spouse (who confirms she is his spouse) and reports that she provides him total care.   ADL   UB Dressing Assistance 2  Maximal Assistance   LB Dressing Assistance 1  Total Assistance   Additional Comments Max/total assistance for all ADLs as pt unwilling to participate.   Bed Mobility   Additional Comments Not assessed, pt received sitting OOB in recliner chair upon arrival to room.   Transfers   Sit to Stand 5  Supervision   Stand to Sit 5  Supervision   Additional Comments Pt performing a sit <> stand transfer at supervision level, using tray table for support as he declines to use any AD.   Functional Mobility   Functional Mobility 5  Supervision   Additional Comments Pt taking a few side steps to the left at supervision level using tray table for support. Pt resistive to further evaluation/intervention. spO2 reading between 87-92% throughout, pt noting to be quite SOB, declining further functional transfers/mobility. At end of session, pt seated in chair with call bell in room and all needs met.   Balance   Static Sitting Good   Dynamic Sitting Fair +   Static Standing Fair -   Activity Tolerance   Activity Tolerance Patient limited by fatigue   Medical Staff Made Aware PT, Saul   RUE Assessment   RUE Assessment   (Appearing grossly functional)   LUE Assessment   LUE Assessment   (Appearing grossly functional)   Hand Function   Gross Motor Coordination Functional   Fine Motor Coordination Functional   Cognition   Overall Cognitive Status WFL   Arousal/Participation Alert   Attention Within functional limits   Comments Pt becoming irritated with questions, unwilling to make eye contact  with therapists. Poor health literacy, poor insight into deficits.   Assessment   Limitation Decreased ADL status;Decreased UE strength;Decreased Safe judgement during ADL;Decreased cognition;Decreased endurance;Decreased self-care trans;Decreased high-level ADLs   Prognosis Poor   Assessment Pt is a 68 y.o. male, admitted to [facility] 3/26/2024 d/t experiencing SOB. Dx: Acute on chronic diastolic CHF. Pt with PMHx impacting their performance during ADL tasks, including: cellulitis of LLE, severe COPD, chronic respiratory failure with hypoxia, tobacco abuse, HTN, obesity, polycythemia, HLD. Prior to admission to the hospital Pt was performing ADLs with physical assistance. IADLs with physical assistance. Functional transfers/ambulation with physical assistance. Cognitive status PTA was WFL. OT order placed to assess Pt's ADLs, cognitive status, and performance during functional tasks in order to maximize safety and independence while making most appropriate d/c recommendations. PT/OT co-evaluation completed at this time d/t significant mobility deficits and safety concerns. Pt's clinical presentation is currently unstable/unpredictable given new onset deficits that affect Pt's occupational performance and ability to safely return to OF including decrease activity tolerance, decrease standing balance, decrease performance during ADL tasks, decrease cognition, decrease BUE ROM, decrease UB MS, increased pain, decrease generalized strength, decrease activity engagement, decrease performance during functional transfers, steps to enter home, high fall risk, and limited insight to deficits combined with medical complications of abnormal CBC, abnormal CO2 values, edema/swelling, agitation, and need for input for mobility technique/safety. Personal factors affecting Pt at time of initial evaluation include: step(s) to enter environment, limited insight into impairments, decreased initiation and engagement, questionable  non-compliance, and tobacco use. Pt will benefit from continued skilled OT services to address deficits as defined above and to maximize level independence/participation during ADLs and functional tasks to facilitate return toward PLOF and improved quality of life. From an occupational therapy standpoint, Level II (Moderate Resource Intensity is recommended upon d/c.   Plan   Treatment Interventions ADL retraining;Functional transfer training;UE strengthening/ROM;Endurance training;Patient/family training;Equipment evaluation/education;Compensatory technique education;Continued evaluation;Cardiac education;Energy conservation;Activityengagement   Goal Expiration Date 04/10/24   OT Frequency 1-2x/wk   Discharge Recommendation   Rehab Resource Intensity Level, OT II (Moderate Resource Intensity)   AM-PAC Daily Activity Inpatient   Lower Body Dressing 1   Bathing 1   Toileting 1   Upper Body Dressing 2   Grooming 2   Eating 3   Daily Activity Raw Score 10   Turning Head Towards Sound 4   Follow Simple Instructions 4   Low Function Daily Activity Raw Score 18   Low Function Daily Activity Standardized Score  30.17   -[facility] Applied Cognition Inpatient   Following a Speech/Presentation 4   Understanding Ordinary Conversation 4   Taking Medications 4   Remembering Where Things Are Placed or Put Away 4   Remembering List of 4-5 Errands 4   Taking Care of Complicated Tasks 4   Applied Cognition Raw Score 24   Applied Cognition Standardized Score 62.21     The patient's raw score on the AM-PAC Daily Activity Inpatient Short Form is 10. A raw score of less than 19 suggests the patient may benefit from discharge to post-acute rehabilitation services. Please refer to the recommendation of the Occupational Therapist for safe discharge planning.    Pt goals to be met by 4/10/2024:    Pt will demonstrate ability to complete grooming/hygiene tasks @ mod I after set-up.  Pt will demonstrate ability to complete supine<>sit @ mod I in  order to increase safety and independence during ADL tasks.  Pt will demonstrate ability to complete UB ADLs including washing/dressing @ mod I in order to increase performance and participation during meaningful tasks  Pt will demonstrate ability to complete LB dressing @ mod I in order to increase safety and independence during meaningful tasks.   Pt will demonstrate ability to aidee/doff socks/shoes while sitting EOB/chair @ mod I in order to increase safety and independence during meaningful tasks.   Pt will demonstrate ability to complete toileting tasks including CM and pericare @ mod I in order to increase safety and independence during meaningful tasks.  Pt will demonstrate ability to complete EOB, chair, toilet/commode transfers @ mod I in order to increase performance and participation during functional tasks.  Pt will demonstrate ability to stand for 20 minutes while maintaining F+ balance with use of RW for UB support PRN.  Pt will demonstrate ability to tolerate 30 minute OT session with no vc'ing for deep breathing or use of energy conservation techniques in order to increase activity tolerance during functional tasks.   Pt will demonstrate Good carryover of use of energy conservation/compensatory strategies during ADLs and functional tasks in order to increase safety and reduce risk for falls.   Pt will demonstrate Good attention and participation in continued evaluation of functional ambulation house hold distances in order to assist with safe d/c planning.  Pt will attend to continued cognitive assessments 100% of the time in order to provide most appropriate d/c recommendations.   Pt will follow 100% simple 2-step commands and be A&O x4 consistently with environmental cues to increase participation in functional activities.   Pt will identify 3 areas of interest/hobbies and 1 intervention on how to incorporate into daily life in order to increase interaction with environment and peers as well as  increase participation in meaningful tasks.   Pt will demonstrate 100% carryover of BUE HEP in order to increase BUE MS and increase performance during functional tasks upon d/c home.    Lew More MS, OTR/L

## 2024-03-27 NOTE — ASSESSMENT & PLAN NOTE
Wt Readings from Last 3 Encounters:   03/27/24 126 kg (277 lb)   07/05/23 133 kg (292 lb 15.9 oz)   01/05/23 131 kg (289 lb 0.4 oz)     History of HFpEF.  Exam is consistent with right sided heart failure with bloated abdomen and legs.  Currently diuresing with IV furosemide at 15 mg/hr. OK to titrate as renal function allows.  Echo today to reassess EF as well as assess aortic valve  Reviewed the importance of his sodium/fluid restriction  Continue strict I's/O's, standing daily weights.  Optimize electrolytes for K+>4, Mag >2.

## 2024-03-27 NOTE — PLAN OF CARE
Problem: PAIN - ADULT  Goal: Verbalizes/displays adequate comfort level or baseline comfort level  Description: Interventions:  - Encourage patient to monitor pain and request assistance  - Assess pain using appropriate pain scale  - Administer analgesics based on type and severity of pain and evaluate response  - Implement non-pharmacological measures as appropriate and evaluate response  - Consider cultural and social influences on pain and pain management  - Notify physician/advanced practitioner if interventions unsuccessful or patient reports new pain  Outcome: Progressing     Problem: INFECTION - ADULT  Goal: Absence or prevention of progression during hospitalization  Description: INTERVENTIONS:  - Assess and monitor for signs and symptoms of infection  - Monitor lab/diagnostic results  - Monitor all insertion sites, i.e. indwelling lines, tubes, and drains  - Monitor endotracheal if appropriate and nasal secretions for changes in amount and color  - [place] appropriate cooling/warming therapies per order  - Administer medications as ordered  - Instruct and encourage patient and family to use good hand hygiene technique  - Identify and instruct in appropriate isolation precautions for identified infection/condition  Outcome: Progressing     Problem: SAFETY ADULT  Goal: Patient will remain free of falls  Description: INTERVENTIONS:  - Educate patient/family on patient safety including physical limitations  - Instruct patient to call for assistance with activity   - Consult OT/PT to assist with strengthening/mobility   - Keep Call bell within reach  - Keep bed low and locked with side rails adjusted as appropriate  - Keep care items and personal belongings within reach  - Initiate and maintain comfort rounds  - Make Fall Risk Sign visible to staff  - Offer Toileting every 2 Hours, in advance of need  - Initiate/Maintain   alarm  - Obtain necessary fall risk management equipment:     - Apply yellow socks and  bracelet for high fall risk patients  - Consider moving patient to room near nurses station  Outcome: Progressing  Goal: Maintain or return to baseline ADL function  Description: INTERVENTIONS:  -  Assess patient's ability to carry out ADLs; assess patient's baseline for ADL function and identify physical deficits which impact ability to perform ADLs (bathing, care of mouth/teeth, toileting, grooming, dressing, etc.)  - Assess/evaluate cause of self-care deficits   - Assess range of motion  - Assess patient's mobility; develop plan if impaired  - Assess patient's need for assistive devices and provide as appropriate  - Encourage maximum independence but intervene and supervise when necessary  - Involve family in performance of ADLs  - Assess for home care needs following discharge   - Consider OT consult to assist with ADL evaluation and planning for discharge  - Provide patient education as appropriate  Outcome: Progressing  Goal: Maintains/Returns to pre admission functional level  Description: INTERVENTIONS:  - Perform AM-PAC 6 Click Basic Mobility/ Daily Activity assessment daily.  - Set and communicate daily mobility goal to care team and patient/family/caregiver.   - Collaborate with rehabilitation services on mobility goals if consulted  - Perform Range of Motion 3 times a day.  - Reposition patient every 2 hours.  - Dangle patient 3 times a day  - Stand patient 3 times a day  - Ambulate patient 3 times a day  - Out of bed to chair 3 times a day   - Out of bed for meals 3 times a day  - Out of bed for toileting  - Record patient progress and toleration of activity level   Outcome: Progressing     Problem: DISCHARGE PLANNING  Goal: Discharge to home or other facility with appropriate resources  Description: INTERVENTIONS:  - Identify barriers to discharge w/patient and caregiver  - Arrange for needed discharge resources and transportation as appropriate  - Identify discharge learning needs (meds, wound care,  etc.)  - Arrange for interpretive services to assist at discharge as needed  - Refer to Case Management Department for coordinating discharge planning if the patient needs post-hospital services based on physician/advanced practitioner order or complex needs related to functional status, cognitive ability, or social support system  Outcome: Progressing     Problem: Knowledge Deficit  Goal: Patient/family/caregiver demonstrates understanding of disease process, treatment plan, medications, and discharge instructions  Description: Complete learning assessment and assess knowledge base.  Interventions:  - Provide teaching at level of understanding  - Provide teaching via preferred learning methods  Outcome: Progressing     Problem: CARDIOVASCULAR - ADULT  Goal: Maintains optimal cardiac output and hemodynamic stability  Description: INTERVENTIONS:  - Monitor I/O, vital signs and rhythm  - Monitor for S/S and trends of decreased cardiac output  - Administer and titrate ordered vasoactive medications to optimize hemodynamic stability  - Assess quality of pulses, skin color and temperature  - Assess for signs of decreased coronary artery perfusion  - Instruct patient to report change in severity of symptoms  Outcome: Progressing  Goal: Absence of cardiac dysrhythmias or at baseline rhythm  Description: INTERVENTIONS:  - Continuous cardiac monitoring, vital signs, obtain 12 lead EKG if ordered  - Administer antiarrhythmic and heart rate control medications as ordered  - Monitor electrolytes and administer replacement therapy as ordered  Outcome: Progressing     Problem: RESPIRATORY - ADULT  Goal: Achieves optimal ventilation and oxygenation  Description: INTERVENTIONS:  - Assess for changes in respiratory status  - Assess for changes in mentation and behavior  - Position to facilitate oxygenation and minimize respiratory effort  - Oxygen administered by appropriate delivery if ordered  - Initiate smoking cessation  education as indicated  - Encourage broncho-pulmonary hygiene including cough, deep breathe, Incentive Spirometry  - Assess the need for suctioning and aspirate as needed  - Assess and instruct to report SOB or any respiratory difficulty  - Respiratory Therapy support as indicated  Outcome: Progressing     Problem: MOBILITY - ADULT  Goal: Maintain or return to baseline ADL function  Description: INTERVENTIONS:  -  Assess patient's ability to carry out ADLs; assess patient's baseline for ADL function and identify physical deficits which impact ability to perform ADLs (bathing, care of mouth/teeth, toileting, grooming, dressing, etc.)  - Assess/evaluate cause of self-care deficits   - Assess range of motion  - Assess patient's mobility; develop plan if impaired  - Assess patient's need for assistive devices and provide as appropriate  - Encourage maximum independence but intervene and supervise when necessary  - Involve family in performance of ADLs  - Assess for home care needs following discharge   - Consider OT consult to assist with ADL evaluation and planning for discharge  - Provide patient education as appropriate  Outcome: Progressing  Goal: Maintains/Returns to pre admission functional level  Description: INTERVENTIONS:  - Perform AM-PAC 6 Click Basic Mobility/ Daily Activity assessment daily.  - Set and communicate daily mobility goal to care team and patient/family/caregiver.   - Collaborate with rehabilitation services on mobility goals if consulted  - Perform Range of Motion 3 times a day.  - Reposition patient every 2 hours.  - Dangle patient 3 times a day  - Stand patient 3 times a day  - Ambulate patient 3 times a day  - Out of bed to chair 3 times a day   - Out of bed for meals 3 times a day  - Out of bed for toileting  - Record patient progress and toleration of activity level   Outcome: Progressing     Problem: Potential for Falls  Goal: Patient will remain free of falls  Description:  INTERVENTIONS:  - Educate patient/family on patient safety including physical limitations  - Instruct patient to call for assistance with activity   - Consult OT/PT to assist with strengthening/mobility   - Keep Call bell within reach  - Keep bed low and locked with side rails adjusted as appropriate  - Keep care items and personal belongings within reach  - Initiate and maintain comfort rounds  - Make Fall Risk Sign visible to staff  - Offer Toileting every 2 Hours, in advance of need  - Initiate/Maintain   alarm  - Obtain necessary fall risk management equipment:     - Apply yellow socks and bracelet for high fall risk patients  - Consider moving patient to room near nurses station  Outcome: Progressing

## 2024-03-27 NOTE — ASSESSMENT & PLAN NOTE
Patient states that he was recently started on antibiotics by his family doctor due to infection of his legs.  Noted to have bilateral lower extremity cellulitis probably exacerbated secondary to swelling of lower extremities.  Will place on IV Ancef for now and continue diuresis and observe  Venous doppler b/l le to r/o dvt

## 2024-03-27 NOTE — CONSULTS
"Consult received for CHF Ed.     Pt reports good appetite at baseline. Currently with nausea. Reports eating 1 meal per day at baseline to help intentionally lose weight \"which has been working for me.\" Reports making all homemade meal options such as chicken noodle soup, spaghetti with meatballs, fish with french fries, etc. Reports using \"just a dash\" of salt on his dinner meal. Did not report snacking in between meals. Chart review of weight hx: 1/20/23 286lb, 7/21/23 285lb, 10/26/23 285lb, 3/27/24 277lb (standing scale).     Pt declined diet instruction, feels knowledgeable of low sodium diet. Did review importance of low sodium diet with pt. Due to limited intake to only 1 meal per day suspect may be within sodium restriction at home even with light salt shaker use. Continue cardiac diet, fluid restriction per MD. Will monitor po intake, consider supplementation as indicated, only 1 day of nausea at this time.   "

## 2024-03-27 NOTE — CONSULTS
Consult Cardiology    Tommie Ramirez 1955, 68 y.o. male MRN: [id number]    Unit/Bed#: -01 Encounter: [id number]    Attending Provider: Charisma Torres MD   Primary Care Provider: Herb Campbell DO   Date admitted to hospital: 3/26/2024       Inpatient consult to Cardiology  Consult performed by: AMALIA Burgos  Consult ordered by: Charisma Torres MD          * Acute on chronic diastolic (congestive) heart failure (HCC)  Assessment & Plan  Wt Readings from Last 3 Encounters:   03/27/24 126 kg (277 lb)   07/05/23 133 kg (292 lb 15.9 oz)   01/05/23 131 kg (289 lb 0.4 oz)     History of HFpEF.  Exam is consistent with right sided heart failure with bloated abdomen and legs.  Currently diuresing with IV furosemide at 15 mg/hr. OK to titrate as renal function allows.  Echo today to reassess EF as well as assess aortic valve  Reviewed the importance of his sodium/fluid restriction  Continue strict I's/O's, standing daily weights.  Optimize electrolytes for K+>4, Mag >2.    Chronic respiratory failure with hypoxia (HCC)  Assessment & Plan  Secondary to severe COPD, HFpEF, obesity  Will monitor with diuresis as O2 requirement remains slightly above baseline at 5 L NC today.    Tobacco abuse  Assessment & Plan  Continues to smoke about 1 PPD. Strongly urged cessation.    Essential hypertension  Assessment & Plan  BP acceptable. Home benazepril on hold for aggressive diuresis, resume prior to discharge    COPD, severe (HCC)  Assessment & Plan  Management as per primary team and respiratory.  He does appear to have thick mucus this morning and I have requested respiratory to evaluate.              Physician Requesting Consult: Charisma Torres MD    Reason for Consult / Principal Problem: Acute on chronic HFpEF          HPI: Tommie Ramirez is a 68 y.o. year old male who has a history of severe COPD, ongoing tobacco use, HFpEF, aortic sclerosis, HTN, HLD, polycythemia  who follows with cardiologist   "Camila with [facility].           Patient presented to [facility] ER 3/26/2024 with progressive shortness of breath and swelling. His cardiologist had transitioned him off furosemide to torsemide, however he stopped it due to kidney pain. He had been taking furosemide 40 mg BID and about 2 weeks ago increased to 80 mg BID with no improvement in symptoms, prompting him to come to the ER. Upon arrival ECG shows SR with RBBB with no acute changes. HS trop neg x 2. BNP normal at 28. Chest xray shows mild pleural effusions. He was started on IV furosemide drip with -2 L output since arrival.    At the time of my evaluation he is sitting up leaning forward in a chair at bedside. He states he has thick phlegm which he is struggling to cough up. He states he does not think he is urinating more than he typically does at home. He continues with shortness of breath and leg swelling. He denies chest pain or lightheadedness. Currently on 5 L NC supplemental O2 which is more than his baseline requirement of 3-4 L. He continues to smoke daily - states he smokes about 1 PPD but is trying to cut down. He does not limit his fluids as he feels his breathing is worse if he is \"too dry\".       Review of Systems   Constitutional:  Positive for fatigue. Negative for chills and fever.   HENT:  Negative for ear pain and sore throat.    Eyes:  Negative for pain and visual disturbance.   Respiratory:  Positive for cough and shortness of breath.    Cardiovascular:  Positive for leg swelling. Negative for chest pain and palpitations.   Gastrointestinal:  Negative for abdominal pain and vomiting.   Genitourinary:  Negative for dysuria and hematuria.   Musculoskeletal:  Negative for arthralgias and back pain.   Skin:  Negative for color change and rash.   Neurological:  Negative for seizures and syncope.   All other systems reviewed and are negative.       Historical Information     Past Medical History:   Diagnosis Date    COPD (chronic " obstructive pulmonary disease) (HCC)     Diastolic heart failure (HCC)     HTN (hypertension)     Hyperlipidemia     Obesity     Polycythemia      Past Surgical History:   Procedure Laterality Date    ARTHROSCOPY KNEE Left     COLONOSCOPY       Social History     Substance and Sexual Activity   Alcohol Use Not Currently     Social History     Substance and Sexual Activity   Drug Use Not Currently     Social History     Tobacco Use   Smoking Status Every Day    Current packs/day: 0.00    Types: Cigarettes    Last attempt to quit: 2022    Years since quittin.8   Smokeless Tobacco Never       Family History:   Family History   Problem Relation Age of Onset    Heart attack Mother     Heart disease Brother        Meds/Allergies     current meds:   Current Facility-Administered Medications   Medication Dose Route Frequency    acetaminophen (TYLENOL) tablet 650 mg  650 mg Oral Q6H PRN    albuterol (PROVENTIL HFA,VENTOLIN HFA) inhaler 2 puff  2 puff Inhalation 4x Daily    albuterol inhalation solution 2.5 mg  2.5 mg Nebulization Q6H PRN    aspirin chewable tablet 81 mg  81 mg Oral BID    ceFAZolin (ANCEF) IVPB (premix in dextrose) 2,000 mg 50 mL  2,000 mg Intravenous Q8H    cyanocobalamin (VITAMIN B-12) tablet 500 mcg  500 mcg Oral Daily    enoxaparin (LOVENOX) subcutaneous injection 40 mg  40 mg Subcutaneous Daily    fluticasone (ARNUITY ELLIPTA) 200 MCG/ACT inhaler 1 puff  1 puff Inhalation Daily    fluticasone (FLONASE) 50 mcg/act nasal spray 2 spray  2 spray Each Nare Daily    furosemide (LASIX) 500 mg infusion 50 mL  15 mg/hr Intravenous Continuous    guaiFENesin (MUCINEX) 12 hr tablet 600 mg  600 mg Oral Q12H CHICHO    melatonin tablet 3 mg  3 mg Oral HS    multivitamin stress formula tablet 1 tablet  1 tablet Oral Daily    nicotine (NICODERM CQ) 21 mg/24 hr TD 24 hr patch 21 mg  21 mg Transdermal Daily    potassium chloride (Klor-Con M10) CR tablet 20 mEq  20 mEq Oral BID    sodium chloride (OCEAN) 0.65 %  "nasal spray 1 spray  1 spray Each Nare Q1H PRN     furosemide, 15 mg/hr, Last Rate: 15 mg/hr (24 0917)        Allergies   Allergen Reactions    Clonidine Anaphylaxis    Other Anaphylaxis     bees    Lansoprazole Rash     Rash       Torsemide Other (See Comments)     Pain in both kidneys    Ampicillin Rash     Rash      Azithromycin Hives and Rash    Codeine Nausea Only and Vomiting     n/v  pill form only; cough medicine okay      Levaquin [Levofloxacin] Hives    Lisinopril Cough     cough      Oxycodone-Acetaminophen Headache     headache    Prednisone Other (See Comments)     unfriendly    Ranitidine Rash     Rash forehead, pepcid      Statins Other (See Comments) and Myalgia     Elevated bp  elevates bp      Sulfa Antibiotics Other (See Comments)     ?       Objective     Vitals: Blood pressure 131/66, pulse 83, temperature (!) 97.2 °F (36.2 °C), resp. rate 22, height 5' 11\" (1.803 m), weight 126 kg (277 lb), SpO2 93%., Body mass index is 38.63 kg/m².    Orthostatic Blood Pressures      Flowsheet Row Most Recent Value   Blood Pressure 131/66 filed at 2024 0708   Patient Position - Orthostatic VS Sitting filed at 2024 1506            Systolic (24hrs), Av , Min:114 , Max:149     Diastolic (24hrs), Av, Min:55, Max:67        Intake/Output Summary (Last 24 hours) at 3/27/2024 1048  Last data filed at 3/27/2024 0952  Gross per 24 hour   Intake 1950 ml   Output 3450 ml   Net -1500 ml       Weight (last 2 days)       Date/Time Weight    24 0600 126 (277)    24 1757 126 (277.1)    24 1651 126 (277.4)    24 1506 129 (283.73)            Invasive Devices       Peripheral Intravenous Line  Duration             Peripheral IV 24 Dorsal (posterior);Right Hand <1 day    Peripheral IV 24 Right Antecubital <1 day                    Physical Exam  Vitals and nursing note reviewed.   Constitutional:       General: He is not in acute distress.     Appearance: He is " well-developed. He is obese.   HENT:      Head: Normocephalic and atraumatic.   Eyes:      Conjunctiva/sclera: Conjunctivae normal.   Neck:      Vascular: No JVD.   Cardiovascular:      Rate and Rhythm: Normal rate and regular rhythm.      Heart sounds: Murmur heard.      Systolic murmur is present.   Pulmonary:      Effort: Pulmonary effort is normal. No respiratory distress.      Breath sounds: Decreased breath sounds (throughout) present.      Comments: 5 L NC  Abdominal:      Palpations: Abdomen is soft.      Tenderness: There is no abdominal tenderness.   Musculoskeletal:         General: No swelling.      Cervical back: Neck supple.      Right lower leg: 3+ Edema present.      Left lower leg: 3+ Edema present.   Skin:     General: Skin is warm and dry.      Capillary Refill: Capillary refill takes less than 2 seconds.   Neurological:      Mental Status: He is alert.   Psychiatric:         Mood and Affect: Mood normal.              Laboratory Results:          CBC with diff:   Results from last 7 days   Lab Units 03/27/24  0503 03/26/24  1525   WBC Thousand/uL 10.75* 11.20*   HEMOGLOBIN g/dL 13.5 14.0   HEMATOCRIT % 42.6 44.6   MCV fL 95 95   PLATELETS Thousands/uL 268 284   RBC Million/uL 4.49 4.68   MCH pg 30.1 29.9   MCHC g/dL 31.7 31.4   RDW % 12.8 12.8   MPV fL 9.1 9.4   NRBC AUTO /100 WBCs  --  0         CMP:  Results from last 7 days   Lab Units 03/27/24  0503 03/26/24  1525   POTASSIUM mmol/L 3.5 3.6   CHLORIDE mmol/L 91* 90*   CO2 mmol/L 40* 43*   BUN mg/dL 13 13   CREATININE mg/dL 0.67 0.71   CALCIUM mg/dL 9.2 9.8   AST U/L  --  36   ALT U/L  --  29   ALK PHOS U/L  --  64   EGFR ml/min/1.73sq m 98 95       BMP:  Results from last 7 days   Lab Units 03/27/24  0503 03/26/24  1525   POTASSIUM mmol/L 3.5 3.6   CHLORIDE mmol/L 91* 90*   CO2 mmol/L 40* 43*   BUN mg/dL 13 13   CREATININE mg/dL 0.67 0.71   CALCIUM mg/dL 9.2 9.8       BNP:    Recent Labs     03/26/24  1525   BNP 28     HS trop:  12->10    Magnesium:   Results from last 7 days   Lab Units 24  1525   MAGNESIUM mg/dL 1.8*     TSH:   5.7    Cardiac testing:     Results for orders placed during the hospital encounter of 21    Echo complete with contrast if indicated    Narrative  [address] [address]  [phone]    Transthoracic Echocardiogram  Limited 2D, M-mode, Doppler, and Color Doppler    Study date:  2021    Patient: LUIS E BANKS  MR number: [id number]  Account number: [id number]  : 1955  Age: 65 years  Gender: Male  Status: Outpatient  Location: Echo lab  Height: 71 in  Weight: 299.4 lb  BP: 134/ 88 mmHg    Indications: dyspnea    Diagnoses: R06.00 - Dyspnea, unspecified    Sonographer:  Fozia Manjarrez RDCS  Primary Physician:  Kate Matta DO  Referring Physician:  BETSEY ALVARADO  Group:  [facility]  Interpreting Physician:  Sherine Velásquez DO    SUMMARY    PROCEDURE INFORMATION:  This was a technically difficult study.    LEFT VENTRICLE:  Systolic function was normal. Ejection fraction was estimated to be 60 %.  Although no diagnostic regional wall motion abnormality was identified, this possibility cannot be completely excluded on the basis of this study.  Wall thickness was mildly increased.  The changes were consistent with concentric remodeling (increased wall thickness with normal wall mass).  Doppler parameters were consistent with abnormal left ventricular relaxation (grade 1 diastolic dysfunction).    RIGHT VENTRICLE:  The ventricle was mildly dilated.  Systolic function was normal.    HISTORY: PRIOR HISTORY: obese body habitus-300lbs. hypertension, copd, pt. on continuous oxygen, current smoker. pt. had to remain sitting for the entire study due to breathing problems    PROCEDURE: The procedure was performed in the echo lab. This was a routine study. The transthoracic approach was used. The study included  limited 2D imaging, M-mode, limited spectral Doppler, and color Doppler. Echocardiographic views were  limited due to restricted patient mobility, poor acoustic window availability, decreased penetration, and lung interference. This was a technically difficult study.    LEFT VENTRICLE: Size was normal. Systolic function was normal. Ejection fraction was estimated to be 60 %. Although no diagnostic regional wall motion abnormality was identified, this possibility cannot be completely excluded on the basis  of this study. Wall thickness was mildly increased. The changes were consistent with concentric remodeling (increased wall thickness with normal wall mass). DOPPLER: Doppler parameters were consistent with abnormal left ventricular  relaxation (grade 1 diastolic dysfunction).    RIGHT VENTRICLE: The ventricle was mildly dilated. Systolic function was normal.    LEFT ATRIUM: Size was normal.    RIGHT ATRIUM: Not well visualized.    MITRAL VALVE: Valve structure was normal. There was normal leaflet separation. DOPPLER: The transmitral velocity was within the normal range. There was no evidence for stenosis. There was trace regurgitation.    AORTIC VALVE: The valve was trileaflet. Leaflets exhibited normal thickness and normal cuspal separation. DOPPLER: Transaortic velocity was within the normal range. There was no evidence for stenosis. There was no regurgitation.    TRICUSPID VALVE: The valve structure was normal. There was normal leaflet separation. DOPPLER: The transtricuspid velocity was within the normal range. There was no evidence for stenosis. There was trace regurgitation. The tricuspid jet  envelope definition was inadequate for estimation of RV systolic pressure.    PULMONIC VALVE: Leaflets exhibited normal thickness, no calcification, and normal cuspal separation. DOPPLER: The transpulmonic velocity was within the normal range. There was no regurgitation.    PERICARDIUM: There was no pericardial  effusion. The pericardium was normal in appearance.    AORTA: The root exhibited normal size.    SYSTEMIC VEINS: IVC: Not assessed.    SYSTEM MEASUREMENT TABLES    2D  %FS: 21.82 %  Ao Diam: 3.17 cm  EDV(Teich): 68.59 ml  EF(Teich): 44.69 %  ESV(Teich): 37.94 ml  IVSd: 1.17 cm  LA Area: 14.4 cm2  LA Diam: 3.22 cm  LVEDV MOD A4C: 123.42 ml  LVEF MOD A4C: 32.93 %  LVESV MOD A4C: 82.77 ml  LVIDd: 4 cm  LVIDs: 3.1 cm  LVLd A4C: 8.91 cm  LVLs A4C: 8.01 cm  LVOT Diam: 2.13 cm  LVPWd: 1.18 cm  RA Area: 16.92 cm2  RVIDd: 4.43 cm  RWT: 0.59  SV MOD A4C: 40.64 ml  SV(Teich): 30.66 ml    CW  MV A Eze: 1.13 m/s  MV Dec Callaway: 4.73 m/s2  MV DecT: 141.88 ms  MV E Eze: 0.67 m/s  MV E/A Ratio: 0.6  PV Vmax: 1.08 m/s  PV maxP.71 mmHg    [facility] Accredited Echocardiography Laboratory    Prepared and electronically signed by    Sherine Velásquez DO  Signed 2021 16:24:05      Imaging: I have personally reviewed pertinent reports.      XR chest 1 view portable    Result Date: 3/27/2024  Narrative: XR CHEST PORTABLE INDICATION: sob. COMPARISON: 2023 FINDINGS: Apical lung fields are obscured. There appears to be some left basilar atelectasis. Minimal left pleural effusion. Normal cardiomediastinal silhouette. Bones are unremarkable for age. Normal upper abdomen.     Impression: Mildly limited exam. Minimal left basilar pleural effusion and basilar atelectasis. Workstation performed: [id number]       EKG reviewed personally: EKG: Sinus rhythm with RBBB .     Telemetry: sinus rhythm, rate       Code Status: Level 1 - Full Code

## 2024-03-28 ENCOUNTER — APPOINTMENT (INPATIENT)
Dept: NON INVASIVE DIAGNOSTICS | Facility: HOSPITAL | Age: 69
DRG: 291 | End: 2024-03-28
Payer: MEDICARE

## 2024-03-28 LAB
ANION GAP SERPL CALCULATED.3IONS-SCNC: 8 MMOL/L (ref 4–13)
ANION GAP SERPL CALCULATED.3IONS-SCNC: 9 MMOL/L (ref 4–13)
AORTIC ROOT: 3.4 CM
AORTIC VALVE MEAN VELOCITY: 15.7 M/S
APICAL FOUR CHAMBER EJECTION FRACTION: 47 %
ASCENDING AORTA: 3.1 CM
AV AREA BY CONTINUOUS VTI: 2 CM2
AV AREA PEAK VELOCITY: 1.8 CM2
AV LVOT MEAN GRADIENT: 1 MMHG
AV LVOT PEAK GRADIENT: 3 MMHG
AV MEAN GRADIENT: 11 MMHG
AV PEAK GRADIENT: 21 MMHG
AV VALVE AREA: 1.98 CM2
AV VELOCITY RATIO: 0.39
BSA FOR ECHO PROCEDURE: 2.42 M2
BUN SERPL-MCNC: 14 MG/DL (ref 5–25)
BUN SERPL-MCNC: 14 MG/DL (ref 5–25)
CALCIUM SERPL-MCNC: 9.1 MG/DL (ref 8.4–10.2)
CALCIUM SERPL-MCNC: 9.4 MG/DL (ref 8.4–10.2)
CHLORIDE SERPL-SCNC: 89 MMOL/L (ref 96–108)
CHLORIDE SERPL-SCNC: 90 MMOL/L (ref 96–108)
CO2 SERPL-SCNC: 41 MMOL/L (ref 21–32)
CO2 SERPL-SCNC: 41 MMOL/L (ref 21–32)
CREAT SERPL-MCNC: 0.65 MG/DL (ref 0.6–1.3)
CREAT SERPL-MCNC: 0.68 MG/DL (ref 0.6–1.3)
D DIMER PPP FEU-MCNC: 0.61 UG/ML FEU
DOP CALC AO PEAK VEL: 2.29 M/S
DOP CALC AO VTI: 44.94 CM
DOP CALC LVOT AREA: 4.52 CM2
DOP CALC LVOT CARDIAC INDEX: 3.15 L/MIN/M2
DOP CALC LVOT CARDIAC OUTPUT: 7.59 L/MIN
DOP CALC LVOT DIAMETER: 2.4 CM
DOP CALC LVOT PEAK VEL VTI: 19.72 CM
DOP CALC LVOT PEAK VEL: 0.9 M/S
DOP CALC LVOT STROKE INDEX: 36.5 ML/M2
DOP CALC LVOT STROKE VOLUME: 89.17
E WAVE DECELERATION TIME: 235 MS
E/A RATIO: 0.75
FRACTIONAL SHORTENING: 6 (ref 28–44)
GFR SERPL CREATININE-BSD FRML MDRD: 97 ML/MIN/1.73SQ M
GFR SERPL CREATININE-BSD FRML MDRD: 99 ML/MIN/1.73SQ M
GLUCOSE SERPL-MCNC: 106 MG/DL (ref 65–140)
GLUCOSE SERPL-MCNC: 110 MG/DL (ref 65–140)
INTERVENTRICULAR SEPTUM IN DIASTOLE (PARASTERNAL SHORT AXIS VIEW): 1.5 CM
INTERVENTRICULAR SEPTUM: 1.5 CM (ref 0.6–1.1)
LAAS-AP2: 17.8 CM2
LAAS-AP4: 23.1 CM2
LEFT ATRIUM SIZE: 3.3 CM
LEFT ATRIUM VOLUME (MOD BIPLANE): 61 ML
LEFT ATRIUM VOLUME INDEX (MOD BIPLANE): 25.3 ML/M2
LEFT INTERNAL DIMENSION IN SYSTOLE: 2.9 CM (ref 2.1–4)
LEFT VENTRICLE DIASTOLIC VOLUME (MOD BIPLANE): 116 ML
LEFT VENTRICLE DIASTOLIC VOLUME INDEX (MOD BIPLANE): 47.9 ML/M2
LEFT VENTRICLE SYSTOLIC VOLUME (MOD BIPLANE): 48 ML
LEFT VENTRICLE SYSTOLIC VOLUME INDEX (MOD BIPLANE): 19.8 ML/M2
LEFT VENTRICULAR INTERNAL DIMENSION IN DIASTOLE: 3.1 CM (ref 3.5–6)
LEFT VENTRICULAR POSTERIOR WALL IN END DIASTOLE: 1.4 CM
LEFT VENTRICULAR STROKE VOLUME: 4 ML
LV EF: 59 %
LVSV (TEICH): 4 ML
MAGNESIUM SERPL-MCNC: 1.7 MG/DL (ref 1.9–2.7)
MAGNESIUM SERPL-MCNC: 1.7 MG/DL (ref 1.9–2.7)
MV E'TISSUE VEL-LAT: 10 CM/S
MV E'TISSUE VEL-SEP: 8 CM/S
MV PEAK A VEL: 0.72 M/S
MV PEAK E VEL: 54 CM/S
MV STENOSIS PRESSURE HALF TIME: 68 MS
MV VALVE AREA P 1/2 METHOD: 3.24
POTASSIUM SERPL-SCNC: 3.1 MMOL/L (ref 3.5–5.3)
POTASSIUM SERPL-SCNC: 3.3 MMOL/L (ref 3.5–5.3)
RIGHT ATRIUM AREA SYSTOLE A4C: 20.4 CM2
RIGHT VENTRICLE ID DIMENSION: 4.2 CM
SL CV LEFT ATRIUM LENGTH A2C: 5.3 CM
SL CV PED ECHO LEFT VENTRICLE DIASTOLIC VOLUME (MOD BIPLANE) 2D: 37 ML
SL CV PED ECHO LEFT VENTRICLE SYSTOLIC VOLUME (MOD BIPLANE) 2D: 33 ML
SODIUM SERPL-SCNC: 138 MMOL/L (ref 135–147)
SODIUM SERPL-SCNC: 140 MMOL/L (ref 135–147)
TRICUSPID ANNULAR PLANE SYSTOLIC EXCURSION: 2.1 CM

## 2024-03-28 PROCEDURE — 83735 ASSAY OF MAGNESIUM: CPT | Performed by: FAMILY MEDICINE

## 2024-03-28 PROCEDURE — 83735 ASSAY OF MAGNESIUM: CPT | Performed by: STUDENT IN AN ORGANIZED HEALTH CARE EDUCATION/TRAINING PROGRAM

## 2024-03-28 PROCEDURE — 85379 FIBRIN DEGRADATION QUANT: CPT | Performed by: FAMILY MEDICINE

## 2024-03-28 PROCEDURE — 93306 TTE W/DOPPLER COMPLETE: CPT | Performed by: INTERNAL MEDICINE

## 2024-03-28 PROCEDURE — 99233 SBSQ HOSP IP/OBS HIGH 50: CPT | Performed by: STUDENT IN AN ORGANIZED HEALTH CARE EDUCATION/TRAINING PROGRAM

## 2024-03-28 PROCEDURE — 80048 BASIC METABOLIC PNL TOTAL CA: CPT | Performed by: FAMILY MEDICINE

## 2024-03-28 PROCEDURE — 93970 EXTREMITY STUDY: CPT

## 2024-03-28 PROCEDURE — 93306 TTE W/DOPPLER COMPLETE: CPT

## 2024-03-28 PROCEDURE — 80048 BASIC METABOLIC PNL TOTAL CA: CPT | Performed by: STUDENT IN AN ORGANIZED HEALTH CARE EDUCATION/TRAINING PROGRAM

## 2024-03-28 RX ORDER — SPIRONOLACTONE 25 MG/1
25 TABLET ORAL DAILY
Status: DISCONTINUED | OUTPATIENT
Start: 2024-03-28 | End: 2024-03-28

## 2024-03-28 RX ORDER — LANOLIN ALCOHOL/MO/W.PET/CERES
400 CREAM (GRAM) TOPICAL DAILY
Status: DISCONTINUED | OUTPATIENT
Start: 2024-03-28 | End: 2024-03-31 | Stop reason: HOSPADM

## 2024-03-28 RX ORDER — POTASSIUM CHLORIDE 20 MEQ/1
20 TABLET, EXTENDED RELEASE ORAL ONCE
Status: DISCONTINUED | OUTPATIENT
Start: 2024-03-28 | End: 2024-03-28

## 2024-03-28 RX ORDER — POTASSIUM CHLORIDE 20 MEQ/1
40 TABLET, EXTENDED RELEASE ORAL 2 TIMES DAILY
Status: DISCONTINUED | OUTPATIENT
Start: 2024-03-28 | End: 2024-03-29

## 2024-03-28 RX ADMIN — CEFAZOLIN SODIUM 2000 MG: 2 SOLUTION INTRAVENOUS at 17:07

## 2024-03-28 RX ADMIN — ALBUTEROL SULFATE 2 PUFF: 90 AEROSOL, METERED RESPIRATORY (INHALATION) at 12:50

## 2024-03-28 RX ADMIN — ALBUTEROL SULFATE 2 PUFF: 90 AEROSOL, METERED RESPIRATORY (INHALATION) at 17:08

## 2024-03-28 RX ADMIN — ENOXAPARIN SODIUM 40 MG: 40 INJECTION SUBCUTANEOUS at 08:14

## 2024-03-28 RX ADMIN — POTASSIUM CHLORIDE 40 MEQ: 1500 TABLET, EXTENDED RELEASE ORAL at 17:07

## 2024-03-28 RX ADMIN — CYANOCOBALAMIN TAB 500 MCG 500 MCG: 500 TAB at 08:14

## 2024-03-28 RX ADMIN — FLUTICASONE FUROATE 1 PUFF: 200 POWDER RESPIRATORY (INHALATION) at 08:14

## 2024-03-28 RX ADMIN — NICOTINE 21 MG: 21 PATCH, EXTENDED RELEASE TRANSDERMAL at 08:17

## 2024-03-28 RX ADMIN — POTASSIUM CHLORIDE 20 MEQ: 750 TABLET, EXTENDED RELEASE ORAL at 08:14

## 2024-03-28 RX ADMIN — CEFAZOLIN SODIUM 2000 MG: 2 SOLUTION INTRAVENOUS at 00:44

## 2024-03-28 RX ADMIN — SALINE NASAL SPRAY 1 SPRAY: 1.5 SOLUTION NASAL at 23:47

## 2024-03-28 RX ADMIN — ASPIRIN 81 MG 81 MG: 81 TABLET ORAL at 08:14

## 2024-03-28 RX ADMIN — CEFAZOLIN SODIUM 2000 MG: 2 SOLUTION INTRAVENOUS at 08:34

## 2024-03-28 RX ADMIN — GUAIFENESIN 600 MG: 600 TABLET ORAL at 08:14

## 2024-03-28 RX ADMIN — ALBUTEROL SULFATE 2 PUFF: 90 AEROSOL, METERED RESPIRATORY (INHALATION) at 23:46

## 2024-03-28 RX ADMIN — ALBUTEROL SULFATE 2 PUFF: 90 AEROSOL, METERED RESPIRATORY (INHALATION) at 08:17

## 2024-03-28 RX ADMIN — Medication 400 MG: at 10:59

## 2024-03-28 RX ADMIN — FLUTICASONE PROPIONATE 2 SPRAY: 50 SPRAY, METERED NASAL at 08:14

## 2024-03-28 RX ADMIN — ASPIRIN 81 MG 81 MG: 81 TABLET ORAL at 21:05

## 2024-03-28 RX ADMIN — Medication 1 TABLET: at 08:15

## 2024-03-28 NOTE — PROGRESS NOTES
[facility]  Progress Note  Name: Tommie Ramirez I  MRN: [id number]  Unit/Bed#: -[place] I Date of Admission: 3/26/2024   Date of Service: 3/28/2024 I Hospital Day: 2    Assessment/Plan   Cellulitis of left lower extremity  Assessment & Plan  Patient states that he was recently started on antibiotics by his family doctor due to infection of his legs.  Noted to have bilateral lower extremity cellulitis probably exacerbated secondary to swelling of lower extremities.  Will place on IV Ancef for now and continue diuresis and observe  Venous doppler b/l le to r/o dvt    Chronic respiratory failure with hypoxia (HCC)  Assessment & Plan  states that he uses 4 L of oxygen at baseline 24/7 secondary to his CHF and COPD  Currently at baseline but feels more sob than his baseline.sleeps in recliner chair    Tobacco abuse  Assessment & Plan  Smoking cessation and placed on nicotine replacement therapy is currently smoking around 1 pack/day and trying to gradually cut back    Essential hypertension  Assessment & Plan  Blood Pressures currently well-controlled hold Benicar for now as he is getting active diuresis    COPD, severe (HCC)  Assessment & Plan  Not in exacerbation continue home meds    * Acute on chronic diastolic (congestive) heart failure (HCC)  Assessment & Plan  Wt Readings from Last 3 Encounters:   03/28/24 125 kg (275 lb 9.2 oz)   07/05/23 133 kg (292 lb 15.9 oz)   01/05/23 131 kg (289 lb 0.4 oz)     Patient states that he has been incrementally increasing his Lasix at home and was up to 80 mg twice daily for the last few weeks but still having increasing redness swelling of his legs  he tried torsemide in the past but it gave him kidney pain so he does not want to use that.  He states he is very swollen right now swollen all the way up to his upper thighs.  Received a dose of IV Lasix in the ED and placed on Lasix drip at 10 mg/h and increase to 15 mg/hr with telemetry monitor  electrolytes .also placed on oral potassium supplementation.  States that he is not diuresing as much as he should be will probably need higher dose of Lasix drip if diuresis does not improve and electrolytes remained stable  Check 2 d echo evaluate LV function                     VTE Pharmacologic Prophylaxis:   Moderate Risk (Score 3-4) - Pharmacological DVT Prophylaxis Ordered: enoxaparin (Lovenox).    Mobility:   Basic Mobility Inpatient Raw Score: 17  JH-HLM Goal: 5: Stand one or more mins  JH-HLM Achieved: 5: Stand (1 or more minutes)  JH-HLM Goal achieved. Continue to encourage appropriate mobility.    Patient Centered Rounds: I performed bedside rounds with nursing staff today.   Discussions with Specialists or Other Care Team Provider: ra cardio    Education and Discussions with Family / Patient:     Total Time Spent on Date of Encounter in care of patient: 45 mins. This time was spent on one or more of the following: performing physical exam; counseling and coordination of care; obtaining or reviewing history; documenting in the medical record; reviewing/ordering tests, medications or procedures; communicating with other healthcare professionals and discussing with patient's family/caregivers.    Current Length of Stay: 2 day(s)  Current Patient Status: Inpatient   Certification Statement: The patient will continue to require additional inpatient hospital stay due to chf  Discharge Plan: Anticipate discharge in 48-72 hrs to home.    Code Status: Level 1 - Full Code    Subjective:   Feels like breathing is getting worse due to having some of his mucus being stuck in his throat.  Otherwise he does not have any fevers or chills.  Is not happy his oxygen requirement is going up, now up to 6 L    Objective:     Vitals:   Temp (24hrs), Av.5 °F (36.4 °C), Min:97.3 °F (36.3 °C), Max:97.7 °F (36.5 °C)    Temp:  [97.3 °F (36.3 °C)-97.7 °F (36.5 °C)] 97.5 °F (36.4 °C)  HR:  [67-98] 91  Resp:  [20-25] 20  BP:  (102-129)/(50-75) 116/75  SpO2:  [91 %-100 %] 92 %  Body mass index is 38.43 kg/m².     Input and Output Summary (last 24 hours):     Intake/Output Summary (Last 24 hours) at 3/28/2024 1131  Last data filed at 3/28/2024 0300  Gross per 24 hour   Intake 848.5 ml   Output 3150 ml   Net -2301.5 ml       Physical Exam:   Physical Exam  Vitals and nursing note reviewed.   Constitutional:       Appearance: Normal appearance. He is ill-appearing.   HENT:      Head: Normocephalic and atraumatic.      Right Ear: External ear normal.      Left Ear: External ear normal.      Nose: Nose normal.      Mouth/Throat:      Pharynx: Oropharynx is clear.   Cardiovascular:      Rate and Rhythm: Normal rate and regular rhythm.      Heart sounds: Normal heart sounds.   Pulmonary:      Effort: Pulmonary effort is normal.      Comments: Moderate air entry bilaterally with decreased breath sounds bilateral bases  Abdominal:      General: Bowel sounds are normal.      Palpations: Abdomen is soft.   Musculoskeletal:         General: Normal range of motion.      Cervical back: Normal range of motion and neck supple.      Right lower leg: Edema present.      Left lower leg: Edema present.   Skin:     General: Skin is warm and dry.      Capillary Refill: Capillary refill takes less than 2 seconds.   Neurological:      General: No focal deficit present.      Mental Status: He is alert and oriented to person, place, and time.   Psychiatric:         Mood and Affect: Mood normal.            Additional Data:     Labs:  Results from last 7 days   Lab Units 03/27/24  0503 03/26/24  1525   WBC Thousand/uL 10.75* 11.20*   HEMOGLOBIN g/dL 13.5 14.0   HEMATOCRIT % 42.6 44.6   PLATELETS Thousands/uL 268 284   NEUTROS PCT %  --  63   LYMPHS PCT %  --  16   MONOS PCT %  --  14*   EOS PCT %  --  6     Results from last 7 days   Lab Units 03/28/24  0457 03/27/24  0503 03/26/24  1525   SODIUM mmol/L 140   < > 140   POTASSIUM mmol/L 3.3*   < > 3.6   CHLORIDE  mmol/L 90*   < > 90*   CO2 mmol/L 41*   < > 43*   BUN mg/dL 14   < > 13   CREATININE mg/dL 0.68   < > 0.71   ANION GAP mmol/L 9   < > 7   CALCIUM mg/dL 9.1   < > 9.8   ALBUMIN g/dL  --   --  3.9   TOTAL BILIRUBIN mg/dL  --   --  0.44   ALK PHOS U/L  --   --  64   ALT U/L  --   --  29   AST U/L  --   --  36   GLUCOSE RANDOM mg/dL 106   < > 87    < > = values in this interval not displayed.                 Results from last 7 days   Lab Units 03/27/24  0503   PROCALCITONIN ng/ml 0.07       Lines/Drains:  Invasive Devices       Peripheral Intravenous Line  Duration             Peripheral IV 03/26/24 Dorsal (posterior);Right Hand 1 day    Peripheral IV 03/26/24 Right Antecubital 1 day                      Telemetry:  Telemetry Orders (From admission, onward)               24 Hour Telemetry Monitoring  Continuous x 24 Hours (Telem)        Question:  Reason for 24 Hour Telemetry  Answer:  Arrhythmias requiring acute medical intervention / PPM or ICD malfunction                     Telemetry Reviewed: Normal Sinus Rhythm  Indication for Continued Telemetry Use: Acute CHF on >200 mg lasix/day or equivalent dose or with new reduced EF.              Imaging: Reviewed radiology reports from this admission including: chest xray    Recent Cultures (last 7 days):         Last 24 Hours Medication List:   Current Facility-Administered Medications   Medication Dose Route Frequency Provider Last Rate    acetaminophen  650 mg Oral Q6H PRN Charisma Torres MD      albuterol  2 puff Inhalation 4x Daily Charisma Torres MD      albuterol  2.5 mg Nebulization Q6H PRN Charisma Torres MD      aspirin  81 mg Oral BID Charisma Torres MD      cefazolin  2,000 mg Intravenous Q8H Charisma Torres MD 2,000 mg (03/28/24 0834)    cyanocobalamin  500 mcg Oral Daily Charisma Torres MD      enoxaparin  40 mg Subcutaneous Daily Charisma Torres MD      fluticasone  1 puff Inhalation Daily Charisma Torres MD      fluticasone  2 spray Each Nare Daily Charisma Torres MD      furosemide   15 mg/hr Intravenous Continuous Charisma Torres MD 15 mg/hr (03/27/24 2105)    guaiFENesin  600 mg Oral Q12H CHICHO Charisma Torres MD      magnesium Oxide  400 mg Oral Daily AMALIA Burgos      melatonin  3 mg Oral HS Carroll Beckford DO      multivitamin stress formula  1 tablet Oral Daily Charisma Torres MD      nicotine  21 mg Transdermal Daily Charisma Torres MD      potassium chloride  40 mEq Oral BID AMALIA Burgos      sodium chloride  1 spray Each Nare Q1H PRN Charisma Torres MD          Today, Patient Was Seen By: Rigo Mota MD    **Please Note: This note may have been constructed using a voice recognition system.**

## 2024-03-28 NOTE — SPEECH THERAPY NOTE
"Speech Language/Pathology  Consult received and chart reviewed. RN reported pt stated he has a hx of difficulty swallowing and this morning had difficulty swallowing his eggs. SLP attempted to complete bedside swallow assessment. Pt sitting in recliner chair in head down position. Pt reported he does not want anyone touching him due to his oxygen being only 86% and he needs to cough up mucus. SLP attempted to direct conversation to swallowing, pt denies difficulty swallowing breakfast and reported staff was \"lying or just stupid.\" Pt endorsed difficulty swallowing pills, then began recording SLP on his cell phone. Asked SLP to leave so that he can work on his breathing so he doesn't die. SLP informed pt that recording staff on his cell phone is not appropriate and exited the room. Patient care manager Mora notified and security called.  SLP unable to complete evaluation at this time    Lexie Muñoz, MS CCC-SLP  3/28/2024    "

## 2024-03-28 NOTE — ASSESSMENT & PLAN NOTE
states that he uses 4 L of oxygen at baseline 24/7 secondary to his CHF and COPD  Currently at baseline but feels more sob than his baseline

## 2024-03-28 NOTE — PLAN OF CARE
Problem: PAIN - ADULT  Goal: Verbalizes/displays adequate comfort level or baseline comfort level  Description: Interventions:  - Encourage patient to monitor pain and request assistance  - Assess pain using appropriate pain scale  - Administer analgesics based on type and severity of pain and evaluate response  - Implement non-pharmacological measures as appropriate and evaluate response  - Consider cultural and social influences on pain and pain management  - Notify physician/advanced practitioner if interventions unsuccessful or patient reports new pain  Outcome: Progressing     Problem: INFECTION - ADULT  Goal: Absence or prevention of progression during hospitalization  Description: INTERVENTIONS:  - Assess and monitor for signs and symptoms of infection  - Monitor lab/diagnostic results  - Monitor all insertion sites, i.e. indwelling lines, tubes, and drains  - Monitor endotracheal if appropriate and nasal secretions for changes in amount and color  - [place] appropriate cooling/warming therapies per order  - Administer medications as ordered  - Instruct and encourage patient and family to use good hand hygiene technique  - Identify and instruct in appropriate isolation precautions for identified infection/condition  Outcome: Progressing     Problem: SAFETY ADULT  Goal: Patient will remain free of falls  Description: INTERVENTIONS:  - Educate patient/family on patient safety including physical limitations  - Instruct patient to call for assistance with activity   - Consult OT/PT to assist with strengthening/mobility   - Keep Call bell within reach  - Keep bed low and locked with side rails adjusted as appropriate  - Keep care items and personal belongings within reach  - Initiate and maintain comfort rounds  - Make Fall Risk Sign visible to staff  - Apply yellow socks and bracelet for high fall risk patients  - Consider moving patient to room near nurses station  Outcome: Progressing  Goal: Maintain or  return to baseline ADL function  Description: INTERVENTIONS:  -  Assess patient's ability to carry out ADLs; assess patient's baseline for ADL function and identify physical deficits which impact ability to perform ADLs (bathing, care of mouth/teeth, toileting, grooming, dressing, etc.)  - Assess/evaluate cause of self-care deficits   - Assess range of motion  - Assess patient's mobility; develop plan if impaired  - Assess patient's need for assistive devices and provide as appropriate  - Encourage maximum independence but intervene and supervise when necessary  - Involve family in performance of ADLs  - Assess for home care needs following discharge   - Consider OT consult to assist with ADL evaluation and planning for discharge  - Provide patient education as appropriate  Outcome: Progressing  Goal: Maintains/Returns to pre admission functional level  Description: INTERVENTIONS:  - Perform AM-PAC 6 Click Basic Mobility/ Daily Activity assessment daily.  - Set and communicate daily mobility goal to care team and patient/family/caregiver.   - Collaborate with rehabilitation services on mobility goals if consulted  - Out of bed for toileting  - Record patient progress and toleration of activity level   Outcome: Progressing     Problem: DISCHARGE PLANNING  Goal: Discharge to home or other facility with appropriate resources  Description: INTERVENTIONS:  - Identify barriers to discharge w/patient and caregiver  - Arrange for needed discharge resources and transportation as appropriate  - Identify discharge learning needs (meds, wound care, etc.)  - Arrange for interpretive services to assist at discharge as needed  - Refer to Case Management Department for coordinating discharge planning if the patient needs post-hospital services based on physician/advanced practitioner order or complex needs related to functional status, cognitive ability, or social support system  Outcome: Progressing     Problem: Knowledge  Deficit  Goal: Patient/family/caregiver demonstrates understanding of disease process, treatment plan, medications, and discharge instructions  Description: Complete learning assessment and assess knowledge base.  Interventions:  - Provide teaching at level of understanding  - Provide teaching via preferred learning methods  Outcome: Progressing     Problem: CARDIOVASCULAR - ADULT  Goal: Maintains optimal cardiac output and hemodynamic stability  Description: INTERVENTIONS:  - Monitor I/O, vital signs and rhythm  - Monitor for S/S and trends of decreased cardiac output  - Administer and titrate ordered vasoactive medications to optimize hemodynamic stability  - Assess quality of pulses, skin color and temperature  - Assess for signs of decreased coronary artery perfusion  - Instruct patient to report change in severity of symptoms  Outcome: Progressing  Goal: Absence of cardiac dysrhythmias or at baseline rhythm  Description: INTERVENTIONS:  - Continuous cardiac monitoring, vital signs, obtain 12 lead EKG if ordered  - Administer antiarrhythmic and heart rate control medications as ordered  - Monitor electrolytes and administer replacement therapy as ordered  Outcome: Progressing     Problem: RESPIRATORY - ADULT  Goal: Achieves optimal ventilation and oxygenation  Description: INTERVENTIONS:  - Assess for changes in respiratory status  - Assess for changes in mentation and behavior  - Position to facilitate oxygenation and minimize respiratory effort  - Oxygen administered by appropriate delivery if ordered  - Initiate smoking cessation education as indicated  - Encourage broncho-pulmonary hygiene including cough, deep breathe, Incentive Spirometry  - Assess the need for suctioning and aspirate as needed  - Assess and instruct to report SOB or any respiratory difficulty  - Respiratory Therapy support as indicated  Outcome: Progressing     Problem: MOBILITY - ADULT  Goal: Maintain or return to baseline ADL  function  Description: INTERVENTIONS:  -  Assess patient's ability to carry out ADLs; assess patient's baseline for ADL function and identify physical deficits which impact ability to perform ADLs (bathing, care of mouth/teeth, toileting, grooming, dressing, etc.)  - Assess/evaluate cause of self-care deficits   - Assess range of motion  - Assess patient's mobility; develop plan if impaired  - Assess patient's need for assistive devices and provide as appropriate  - Encourage maximum independence but intervene and supervise when necessary  - Involve family in performance of ADLs  - Assess for home care needs following discharge   - Consider OT consult to assist with ADL evaluation and planning for discharge  - Provide patient education as appropriate  Outcome: Progressing  Goal: Maintains/Returns to pre admission functional level  Description: INTERVENTIONS:  - Perform AM-PAC 6 Click Basic Mobility/ Daily Activity assessment daily.  - Set and communicate daily mobility goal to care team and patient/family/caregiver.   - Collaborate with rehabilitation services on mobility goals if consulted  - Out of bed for toileting  - Record patient progress and toleration of activity level   Outcome: Progressing     Problem: Potential for Falls  Goal: Patient will remain free of falls  Description: INTERVENTIONS:  - Educate patient/family on patient safety including physical limitations  - Instruct patient to call for assistance with activity   - Consult OT/PT to assist with strengthening/mobility   - Keep Call bell within reach  - Keep bed low and locked with side rails adjusted as appropriate  - Keep care items and personal belongings within reach  - Initiate and maintain comfort rounds  - Make Fall Risk Sign visible to staff  - Apply yellow socks and bracelet for high fall risk patients  - Consider moving patient to room near nurses station  Outcome: Progressing

## 2024-03-28 NOTE — ASSESSMENT & PLAN NOTE
Wt Readings from Last 3 Encounters:   03/28/24 125 kg (275 lb 9.2 oz)   07/05/23 133 kg (292 lb 15.9 oz)   01/05/23 131 kg (289 lb 0.4 oz)     Worsening lower extremity edema, takes home diuretics, there is a question of fluid/dietary compliance  Was started on IV twice daily diuretics, not significant response and has been subsequently started on a Lasix drip  Echocardiogram showed ejection fraction 75% with diastolic disease  Trend electrolytes and replete as needed  Monitor I's and O's and daily weights,  Good net urine output on Lasix drip  Discussed with cardiology, anticipate transition to oral diuretics, possibly on spironolactone tomorrow

## 2024-03-28 NOTE — ASSESSMENT & PLAN NOTE
Reports he was recently started on antibiotics as an outpatient  Seems to have improved with diuresis and IV Ancef  Will likely continue 10-day course of oral antibiotics upon discharge with oral cephalosporin

## 2024-03-28 NOTE — CASE MANAGEMENT
Case Management Discharge Planning Note    Patient name Tommie Ramirez  Location /-01 MRN [id number]  : 1955 Date 3/28/2024       Current Admission Date: 3/26/2024  Current Admission Diagnosis:Acute on chronic diastolic (congestive) heart failure (HCC)   Patient Active Problem List    Diagnosis Date Noted    Cellulitis of left lower extremity 2024    Acute on chronic diastolic (congestive) heart failure (HCC) 2023    Elevated lipase 2023    Chronic respiratory failure with hypoxia (HCC) 2023    Acute on chronic respiratory failure (HCC) 2022    Acute diastolic (congestive) heart failure (HCC) 2022    Morbid obesity with BMI of 40.0-44.9, adult (HCC) 2022    COPD, severe ([facility])     Essential hypertension     Tobacco abuse       LOS (days): 2  Geometric Mean LOS (GMLOS) (days): 3.9  Days to GMLOS:2.1     OBJECTIVE:  Risk of Unplanned Readmission Score: 11.1         Current admission status: Inpatient   [place]   [facility] [place] [address] [facility]  [address] [facility] [address]  Phone: [phone] Fax: [phone]    [facility]/pharmacy #[id number] - [place], [place] - [address]  [address] [place] [address]  Phone: [phone] Fax: [phone]    Primary Care Provider: Herb Campbell DO    Primary Insurance: MEDICARE  Secondary Insurance: [facility]    DISCHARGE DETAILS:          Treatment Team Recommendation: Short Term Rehab  Discharge Destination Plan:: Home  Transport at Discharge : Family               CM met with patient and wife to discuss discharge planning. CM discussed that recommended LOC is rehab. Patient is not in agreement with this. Patient is refusing all LOC (HHC, OP and STR).     CM to follow patient's care and discharge needs.

## 2024-03-29 LAB
ANION GAP SERPL CALCULATED.3IONS-SCNC: 8 MMOL/L (ref 4–13)
BUN SERPL-MCNC: 15 MG/DL (ref 5–25)
CALCIUM SERPL-MCNC: 9.2 MG/DL (ref 8.4–10.2)
CHLORIDE SERPL-SCNC: 88 MMOL/L (ref 96–108)
CO2 SERPL-SCNC: 43 MMOL/L (ref 21–32)
CREAT SERPL-MCNC: 0.73 MG/DL (ref 0.6–1.3)
GFR SERPL CREATININE-BSD FRML MDRD: 94 ML/MIN/1.73SQ M
GLUCOSE SERPL-MCNC: 150 MG/DL (ref 65–140)
MAGNESIUM SERPL-MCNC: 1.7 MG/DL (ref 1.9–2.7)
POTASSIUM SERPL-SCNC: 3.2 MMOL/L (ref 3.5–5.3)
SODIUM SERPL-SCNC: 139 MMOL/L (ref 135–147)

## 2024-03-29 PROCEDURE — 80048 BASIC METABOLIC PNL TOTAL CA: CPT | Performed by: HOSPITALIST

## 2024-03-29 PROCEDURE — 92610 EVALUATE SWALLOWING FUNCTION: CPT

## 2024-03-29 PROCEDURE — 83735 ASSAY OF MAGNESIUM: CPT | Performed by: HOSPITALIST

## 2024-03-29 PROCEDURE — 99233 SBSQ HOSP IP/OBS HIGH 50: CPT | Performed by: HOSPITALIST

## 2024-03-29 RX ORDER — POTASSIUM CHLORIDE 20 MEQ/1
40 TABLET, EXTENDED RELEASE ORAL ONCE
Status: COMPLETED | OUTPATIENT
Start: 2024-03-29 | End: 2024-03-29

## 2024-03-29 RX ORDER — POTASSIUM CHLORIDE 20 MEQ/1
40 TABLET, EXTENDED RELEASE ORAL
Status: DISCONTINUED | OUTPATIENT
Start: 2024-03-29 | End: 2024-03-30

## 2024-03-29 RX ORDER — MAGNESIUM SULFATE HEPTAHYDRATE 40 MG/ML
2 INJECTION, SOLUTION INTRAVENOUS ONCE
Status: COMPLETED | OUTPATIENT
Start: 2024-03-29 | End: 2024-03-29

## 2024-03-29 RX ORDER — MOMETASONE FUROATE 50 UG/1
2 SPRAY, METERED NASAL 2 TIMES DAILY
Status: DISCONTINUED | OUTPATIENT
Start: 2024-03-29 | End: 2024-03-31 | Stop reason: HOSPADM

## 2024-03-29 RX ADMIN — ENOXAPARIN SODIUM 40 MG: 40 INJECTION SUBCUTANEOUS at 08:47

## 2024-03-29 RX ADMIN — ALBUTEROL SULFATE 2 PUFF: 90 AEROSOL, METERED RESPIRATORY (INHALATION) at 22:30

## 2024-03-29 RX ADMIN — ASPIRIN 81 MG 81 MG: 81 TABLET ORAL at 08:47

## 2024-03-29 RX ADMIN — CEFAZOLIN SODIUM 2000 MG: 2 SOLUTION INTRAVENOUS at 17:11

## 2024-03-29 RX ADMIN — FLUTICASONE FUROATE 1 PUFF: 200 POWDER RESPIRATORY (INHALATION) at 08:46

## 2024-03-29 RX ADMIN — ALBUTEROL SULFATE 2 PUFF: 90 AEROSOL, METERED RESPIRATORY (INHALATION) at 17:17

## 2024-03-29 RX ADMIN — ALBUTEROL SULFATE 2 PUFF: 90 AEROSOL, METERED RESPIRATORY (INHALATION) at 08:50

## 2024-03-29 RX ADMIN — POTASSIUM CHLORIDE 40 MEQ: 1500 TABLET, EXTENDED RELEASE ORAL at 13:11

## 2024-03-29 RX ADMIN — SALINE NASAL SPRAY 1 SPRAY: 1.5 SOLUTION NASAL at 14:12

## 2024-03-29 RX ADMIN — ASPIRIN 81 MG 81 MG: 81 TABLET ORAL at 22:04

## 2024-03-29 RX ADMIN — MAGNESIUM SULFATE HEPTAHYDRATE 2 G: 40 INJECTION, SOLUTION INTRAVENOUS at 13:11

## 2024-03-29 RX ADMIN — POTASSIUM CHLORIDE 40 MEQ: 1500 TABLET, EXTENDED RELEASE ORAL at 17:11

## 2024-03-29 RX ADMIN — ACETAMINOPHEN 325MG 650 MG: 325 TABLET ORAL at 05:27

## 2024-03-29 RX ADMIN — NICOTINE 21 MG: 21 PATCH, EXTENDED RELEASE TRANSDERMAL at 08:50

## 2024-03-29 RX ADMIN — CEFAZOLIN SODIUM 2000 MG: 2 SOLUTION INTRAVENOUS at 08:47

## 2024-03-29 RX ADMIN — CEFAZOLIN SODIUM 2000 MG: 2 SOLUTION INTRAVENOUS at 02:15

## 2024-03-29 RX ADMIN — PAROXETINE HYDROCHLORIDE 2 SPRAY: 20 TABLET, FILM COATED ORAL at 09:27

## 2024-03-29 RX ADMIN — PAROXETINE HYDROCHLORIDE 2 SPRAY: 20 TABLET, FILM COATED ORAL at 22:08

## 2024-03-29 RX ADMIN — CYANOCOBALAMIN TAB 500 MCG 500 MCG: 500 TAB at 08:46

## 2024-03-29 RX ADMIN — Medication 15 MG/HR: at 04:39

## 2024-03-29 RX ADMIN — Medication 400 MG: at 08:46

## 2024-03-29 RX ADMIN — POTASSIUM CHLORIDE 40 MEQ: 1500 TABLET, EXTENDED RELEASE ORAL at 08:47

## 2024-03-29 RX ADMIN — BISMUTH SUBSALICYLATE 524 MG: 525 LIQUID ORAL at 02:48

## 2024-03-29 RX ADMIN — ALBUTEROL SULFATE 2 PUFF: 90 AEROSOL, METERED RESPIRATORY (INHALATION) at 12:02

## 2024-03-29 RX ADMIN — Medication 1 TABLET: at 08:47

## 2024-03-29 NOTE — PLAN OF CARE
Problem: PAIN - ADULT  Goal: Verbalizes/displays adequate comfort level or baseline comfort level  Description: Interventions:  - Encourage patient to monitor pain and request assistance  - Assess pain using appropriate pain scale  - Administer analgesics based on type and severity of pain and evaluate response  - Implement non-pharmacological measures as appropriate and evaluate response  - Consider cultural and social influences on pain and pain management  - Notify physician/advanced practitioner if interventions unsuccessful or patient reports new pain  Outcome: Progressing     Problem: INFECTION - ADULT  Goal: Absence or prevention of progression during hospitalization  Description: INTERVENTIONS:  - Assess and monitor for signs and symptoms of infection  - Monitor lab/diagnostic results  - Monitor all insertion sites, i.e. indwelling lines, tubes, and drains  - Monitor endotracheal if appropriate and nasal secretions for changes in amount and color  - [place] appropriate cooling/warming therapies per order  - Administer medications as ordered  - Instruct and encourage patient and family to use good hand hygiene technique  - Identify and instruct in appropriate isolation precautions for identified infection/condition  Outcome: Progressing     Problem: SAFETY ADULT  Goal: Patient will remain free of falls  Description: INTERVENTIONS:  - Educate patient/family on patient safety including physical limitations  - Instruct patient to call for assistance with activity   - Consult OT/PT to assist with strengthening/mobility   - Keep Call bell within reach  - Keep bed low and locked with side rails adjusted as appropriate  - Keep care items and personal belongings within reach  - Initiate and maintain comfort rounds  - Make Fall Risk Sign visible to staff  - Apply yellow socks and bracelet for high fall risk patients  - Consider moving patient to room near nurses station  Outcome: Progressing  Goal: Maintain or  return to baseline ADL function  Description: INTERVENTIONS:  -  Assess patient's ability to carry out ADLs; assess patient's baseline for ADL function and identify physical deficits which impact ability to perform ADLs (bathing, care of mouth/teeth, toileting, grooming, dressing, etc.)  - Assess/evaluate cause of self-care deficits   - Assess range of motion  - Assess patient's mobility; develop plan if impaired  - Assess patient's need for assistive devices and provide as appropriate  - Encourage maximum independence but intervene and supervise when necessary  - Involve family in performance of ADLs  - Assess for home care needs following discharge   - Consider OT consult to assist with ADL evaluation and planning for discharge  - Provide patient education as appropriate  Outcome: Progressing  Goal: Maintains/Returns to pre admission functional level  Description: INTERVENTIONS:  - Perform AM-PAC 6 Click Basic Mobility/ Daily Activity assessment daily.  - Set and communicate daily mobility goal to care team and patient/family/caregiver.   - Collaborate with rehabilitation services on mobility goals if consulted  - Out of bed for toileting  - Record patient progress and toleration of activity level   Outcome: Progressing     Problem: DISCHARGE PLANNING  Goal: Discharge to home or other facility with appropriate resources  Description: INTERVENTIONS:  - Identify barriers to discharge w/patient and caregiver  - Arrange for needed discharge resources and transportation as appropriate  - Identify discharge learning needs (meds, wound care, etc.)  - Arrange for interpretive services to assist at discharge as needed  - Refer to Case Management Department for coordinating discharge planning if the patient needs post-hospital services based on physician/advanced practitioner order or complex needs related to functional status, cognitive ability, or social support system  Outcome: Progressing     Problem: Knowledge  Deficit  Goal: Patient/family/caregiver demonstrates understanding of disease process, treatment plan, medications, and discharge instructions  Description: Complete learning assessment and assess knowledge base.  Interventions:  - Provide teaching at level of understanding  - Provide teaching via preferred learning methods  Outcome: Progressing     Problem: CARDIOVASCULAR - ADULT  Goal: Maintains optimal cardiac output and hemodynamic stability  Description: INTERVENTIONS:  - Monitor I/O, vital signs and rhythm  - Monitor for S/S and trends of decreased cardiac output  - Administer and titrate ordered vasoactive medications to optimize hemodynamic stability  - Assess quality of pulses, skin color and temperature  - Assess for signs of decreased coronary artery perfusion  - Instruct patient to report change in severity of symptoms  Outcome: Progressing  Goal: Absence of cardiac dysrhythmias or at baseline rhythm  Description: INTERVENTIONS:  - Continuous cardiac monitoring, vital signs, obtain 12 lead EKG if ordered  - Administer antiarrhythmic and heart rate control medications as ordered  - Monitor electrolytes and administer replacement therapy as ordered  Outcome: Progressing     Problem: RESPIRATORY - ADULT  Goal: Achieves optimal ventilation and oxygenation  Description: INTERVENTIONS:  - Assess for changes in respiratory status  - Assess for changes in mentation and behavior  - Position to facilitate oxygenation and minimize respiratory effort  - Oxygen administered by appropriate delivery if ordered  - Initiate smoking cessation education as indicated  - Encourage broncho-pulmonary hygiene including cough, deep breathe, Incentive Spirometry  - Assess the need for suctioning and aspirate as needed  - Assess and instruct to report SOB or any respiratory difficulty  - Respiratory Therapy support as indicated  Outcome: Progressing     Problem: MOBILITY - ADULT  Goal: Maintain or return to baseline ADL  function  Description: INTERVENTIONS:  -  Assess patient's ability to carry out ADLs; assess patient's baseline for ADL function and identify physical deficits which impact ability to perform ADLs (bathing, care of mouth/teeth, toileting, grooming, dressing, etc.)  - Assess/evaluate cause of self-care deficits   - Assess range of motion  - Assess patient's mobility; develop plan if impaired  - Assess patient's need for assistive devices and provide as appropriate  - Encourage maximum independence but intervene and supervise when necessary  - Involve family in performance of ADLs  - Assess for home care needs following discharge   - Consider OT consult to assist with ADL evaluation and planning for discharge  - Provide patient education as appropriate  Outcome: Progressing  Goal: Maintains/Returns to pre admission functional level  Description: INTERVENTIONS:  - Perform AM-PAC 6 Click Basic Mobility/ Daily Activity assessment daily.  - Set and communicate daily mobility goal to care team and patient/family/caregiver.   - Collaborate with rehabilitation services on mobility goals if consulted  - Out of bed for toileting  - Record patient progress and toleration of activity level   Outcome: Progressing     Problem: Potential for Falls  Goal: Patient will remain free of falls  Description: INTERVENTIONS:  - Educate patient/family on patient safety including physical limitations  - Instruct patient to call for assistance with activity   - Consult OT/PT to assist with strengthening/mobility   - Keep Call bell within reach  - Keep bed low and locked with side rails adjusted as appropriate  - Keep care items and personal belongings within reach  - Initiate and maintain comfort rounds  - Make Fall Risk Sign visible to staff  - Apply yellow socks and bracelet for high fall risk patients  - Consider moving patient to room near nurses station  Outcome: Progressing

## 2024-03-29 NOTE — PLAN OF CARE
Problem: PAIN - ADULT  Goal: Verbalizes/displays adequate comfort level or baseline comfort level  Description: Interventions:  - Encourage patient to monitor pain and request assistance  - Assess pain using appropriate pain scale  - Administer analgesics based on type and severity of pain and evaluate response  - Implement non-pharmacological measures as appropriate and evaluate response  - Consider cultural and social influences on pain and pain management  - Notify physician/advanced practitioner if interventions unsuccessful or patient reports new pain  Outcome: Progressing     Problem: INFECTION - ADULT  Goal: Absence or prevention of progression during hospitalization  Description: INTERVENTIONS:  - Assess and monitor for signs and symptoms of infection  - Monitor lab/diagnostic results  - Monitor all insertion sites, i.e. indwelling lines, tubes, and drains  - Monitor endotracheal if appropriate and nasal secretions for changes in amount and color  - Bowman appropriate cooling/warming therapies per order  - Administer medications as ordered  - Instruct and encourage patient and family to use good hand hygiene technique  - Identify and instruct in appropriate isolation precautions for identified infection/condition  Outcome: Progressing     Problem: SAFETY ADULT  Goal: Patient will remain free of falls  Description: INTERVENTIONS:  - Educate patient/family on patient safety including physical limitations  - Instruct patient to call for assistance with activity   - Consult OT/PT to assist with strengthening/mobility   - Keep Call bell within reach  - Keep bed low and locked with side rails adjusted as appropriate  - Keep care items and personal belongings within reach  - Initiate and maintain comfort rounds  - Make Fall Risk Sign visible to staff  - Offer Toileting every 2 Hours, in advance of need  - Initiate/Maintain bed and chair alarm  - Obtain necessary fall risk management equipment:   - Apply yellow  socks and bracelet for high fall risk patients  - Consider moving patient to room near nurses station  Outcome: Progressing  Goal: Maintain or return to baseline ADL function  Description: INTERVENTIONS:  -  Assess patient's ability to carry out ADLs; assess patient's baseline for ADL function and identify physical deficits which impact ability to perform ADLs (bathing, care of mouth/teeth, toileting, grooming, dressing, etc.)  - Assess/evaluate cause of self-care deficits   - Assess range of motion  - Assess patient's mobility; develop plan if impaired  - Assess patient's need for assistive devices and provide as appropriate  - Encourage maximum independence but intervene and supervise when necessary  - Involve family in performance of ADLs  - Assess for home care needs following discharge   - Consider OT consult to assist with ADL evaluation and planning for discharge  - Provide patient education as appropriate  Outcome: Progressing  Goal: Maintains/Returns to pre admission functional level  Description: INTERVENTIONS:  - Perform AM-PAC 6 Click Basic Mobility/ Daily Activity assessment daily.  - Set and communicate daily mobility goal to care team and patient/family/caregiver.   - Collaborate with rehabilitation services on mobility goals if consulted  - Perform Range of Motion 3 times a day.  - Reposition patient every 2 hours.  - Dangle patient 3 times a day  - Stand patient 3 times a day  - Ambulate patient 3 times a day  - Out of bed to chair 3 times a day   - Out of bed for meals 3 times a day  - Out of bed for toileting  - Record patient progress and toleration of activity level   Outcome: Progressing     Problem: DISCHARGE PLANNING  Goal: Discharge to home or other facility with appropriate resources  Description: INTERVENTIONS:  - Identify barriers to discharge w/patient and caregiver  - Arrange for needed discharge resources and transportation as appropriate  - Identify discharge learning needs (meds,  wound care, etc.)  - Arrange for interpretive services to assist at discharge as needed  - Refer to Case Management Department for coordinating discharge planning if the patient needs post-hospital services based on physician/advanced practitioner order or complex needs related to functional status, cognitive ability, or social support system  Outcome: Progressing     Problem: Knowledge Deficit  Goal: Patient/family/caregiver demonstrates understanding of disease process, treatment plan, medications, and discharge instructions  Description: Complete learning assessment and assess knowledge base.  Interventions:  - Provide teaching at level of understanding  - Provide teaching via preferred learning methods  Outcome: Progressing     Problem: CARDIOVASCULAR - ADULT  Goal: Maintains optimal cardiac output and hemodynamic stability  Description: INTERVENTIONS:  - Monitor I/O, vital signs and rhythm  - Monitor for S/S and trends of decreased cardiac output  - Administer and titrate ordered vasoactive medications to optimize hemodynamic stability  - Assess quality of pulses, skin color and temperature  - Assess for signs of decreased coronary artery perfusion  - Instruct patient to report change in severity of symptoms  Outcome: Progressing  Goal: Absence of cardiac dysrhythmias or at baseline rhythm  Description: INTERVENTIONS:  - Continuous cardiac monitoring, vital signs, obtain 12 lead EKG if ordered  - Administer antiarrhythmic and heart rate control medications as ordered  - Monitor electrolytes and administer replacement therapy as ordered  Outcome: Progressing     Problem: RESPIRATORY - ADULT  Goal: Achieves optimal ventilation and oxygenation  Description: INTERVENTIONS:  - Assess for changes in respiratory status  - Assess for changes in mentation and behavior  - Position to facilitate oxygenation and minimize respiratory effort  - Oxygen administered by appropriate delivery if ordered  - Initiate smoking  cessation education as indicated  - Encourage broncho-pulmonary hygiene including cough, deep breathe, Incentive Spirometry  - Assess the need for suctioning and aspirate as needed  - Assess and instruct to report SOB or any respiratory difficulty  - Respiratory Therapy support as indicated  Outcome: Progressing     Problem: MOBILITY - ADULT  Goal: Maintain or return to baseline ADL function  Description: INTERVENTIONS:  -  Assess patient's ability to carry out ADLs; assess patient's baseline for ADL function and identify physical deficits which impact ability to perform ADLs (bathing, care of mouth/teeth, toileting, grooming, dressing, etc.)  - Assess/evaluate cause of self-care deficits   - Assess range of motion  - Assess patient's mobility; develop plan if impaired  - Assess patient's need for assistive devices and provide as appropriate  - Encourage maximum independence but intervene and supervise when necessary  - Involve family in performance of ADLs  - Assess for home care needs following discharge   - Consider OT consult to assist with ADL evaluation and planning for discharge  - Provide patient education as appropriate  Outcome: Progressing  Goal: Maintains/Returns to pre admission functional level  Description: INTERVENTIONS:  - Perform AM-PAC 6 Click Basic Mobility/ Daily Activity assessment daily.  - Set and communicate daily mobility goal to care team and patient/family/caregiver.   - Collaborate with rehabilitation services on mobility goals if consulted  - Perform Range of Motion 3 times a day.  - Reposition patient every 2 hours.  - Dangle patient 3 times a day  - Stand patient 3 times a day  - Ambulate patient 3 times a day  - Out of bed to chair 3 times a day   - Out of bed for meals 3 times a day  - Out of bed for toileting  - Record patient progress and toleration of activity level   Outcome: Progressing     Problem: Potential for Falls  Goal: Patient will remain free of falls  Description:  INTERVENTIONS:  - Educate patient/family on patient safety including physical limitations  - Instruct patient to call for assistance with activity   - Consult OT/PT to assist with strengthening/mobility   - Keep Call bell within reach  - Keep bed low and locked with side rails adjusted as appropriate  - Keep care items and personal belongings within reach  - Initiate and maintain comfort rounds  - Make Fall Risk Sign visible to staff  - Offer Toileting every 2 Hours, in advance of need  - Initiate/Maintain bed and chair alarm  - Obtain necessary fall risk management equipment:   - Apply yellow socks and bracelet for high fall risk patients  - Consider moving patient to room near nurses station  Outcome: Progressing

## 2024-03-29 NOTE — PROGRESS NOTES
[facility]  Progress Note  Name: Tommie Ramirez I  MRN: [id number]  Unit/Bed#: -[facility] I Date of Admission: 3/26/2024   Date of Service: 3/29/2024 I Hospital Day: 3    Assessment/Plan   * Acute on chronic diastolic (congestive) heart failure (HCC)  Assessment & Plan  Wt Readings from Last 3 Encounters:   03/28/24 125 kg (275 lb 9.2 oz)   07/05/23 133 kg (292 lb 15.9 oz)   01/05/23 131 kg (289 lb 0.4 oz)     Worsening lower extremity edema, takes home diuretics, there is a question of fluid/dietary compliance  Was started on IV twice daily diuretics, not significant response and has been subsequently started on a Lasix drip  Echocardiogram showed ejection fraction 75% with diastolic disease  Trend electrolytes and replete as needed  Monitor I's and O's and daily weights,  Good net urine output on Lasix drip  Discussed with cardiology, anticipate transition to oral diuretics, possibly on spironolactone tomorrow          Cellulitis of left lower extremity  Assessment & Plan  Reports he was recently started on antibiotics as an outpatient  Seems to have improved with diuresis and IV Ancef  Will likely continue 10-day course of oral antibiotics upon discharge with oral cephalosporin    Chronic respiratory failure with hypoxia (HCC)  Assessment & Plan  states that he uses 4 L of oxygen at baseline 24/7 secondary to his CHF and COPD  Currently at baseline but feels more sob than his baseline    Tobacco abuse  Assessment & Plan  Smoking cessation and placed on nicotine replacement therapy is currently smoking around 1 pack/day and trying to gradually cut back    Essential hypertension  Assessment & Plan  Blood Pressures currently well-controlled hold Benicar for now as he is getting active diuresis    COPD, severe (HCC)  Assessment & Plan  Not in exacerbation continue home meds               VTE Pharmacologic Prophylaxis:   Moderate Risk (Score 3-4) - Pharmacological DVT Prophylaxis  Ordered: enoxaparin (Lovenox).    Mobility:   Basic Mobility Inpatient Raw Score: 18  JH-HLM Goal: 6: Walk 10 steps or more  JH-HLM Achieved: 5: Stand (1 or more minutes)  JH-HLM Goal NOT achieved. Continue with multidisciplinary rounding and encourage appropriate mobility to improve upon JH-HLM goals.    Patient Centered Rounds: I performed bedside rounds with nursing staff today.   Discussions with Specialists or Other Care Team Provider: none    Education and Discussions with Family / Patient: Updated  (wife) at bedside.  And daughter via phone    Total Time Spent on Date of Encounter in care of patient: 40 mins. This time was spent on one or more of the following: performing physical exam; counseling and coordination of care; obtaining or reviewing history; documenting in the medical record; reviewing/ordering tests, medications or procedures; communicating with other healthcare professionals and discussing with patient's family/caregivers.    Current Length of Stay: 3 day(s)  Current Patient Status: Inpatient   Certification Statement: The patient will continue to require additional inpatient hospital stay due to chf  Discharge Plan: Anticipate discharge tomorrow to home with home services.    Code Status: Level 1 - Full Code    Subjective:   Patient has multiple various concerns about care and events that have happened in the hospitalization, some about people bothering him too much, there is about how he feels some medications were administered.  Did discuss these issues with the patient.  Ultimately he still feels more short of breath than his baseline, but does feel overall improvement.    Objective:     Vitals:   Temp (24hrs), Av.4 °F (36.3 °C), Min:97.3 °F (36.3 °C), Max:97.5 °F (36.4 °C)    Temp:  [97.3 °F (36.3 °C)-97.5 °F (36.4 °C)] 97.3 °F (36.3 °C)  HR:  [83-90] 84  Resp:  [16-20] 16  BP: (118-133)/(64-72) 133/72  SpO2:  [91 %-98 %] 92 %  Body mass index is 37.82 kg/m².     Input  and Output Summary (last 24 hours):     Intake/Output Summary (Last 24 hours) at 3/29/2024 1533  Last data filed at 3/29/2024 1311  Gross per 24 hour   Intake 430 ml   Output 2830 ml   Net -2400 ml       Physical Exam:   Physical Exam  Vitals and nursing note reviewed.   Constitutional:       General: He is not in acute distress.     Appearance: He is well-developed. He is ill-appearing.   HENT:      Head: Normocephalic and atraumatic.   Eyes:      Conjunctiva/sclera: Conjunctivae normal.   Cardiovascular:      Rate and Rhythm: Normal rate and regular rhythm.      Heart sounds: No murmur heard.  Pulmonary:      Effort: Pulmonary effort is normal. No respiratory distress.      Comments: Decreased breath sounds throughout, no wheezing or rhonchi  Abdominal:      Palpations: Abdomen is soft.      Tenderness: There is no abdominal tenderness.   Musculoskeletal:         General: No swelling.      Cervical back: Neck supple.      Right lower leg: Edema present.      Left lower leg: Edema present.   Skin:     General: Skin is warm and dry.      Capillary Refill: Capillary refill takes less than 2 seconds.   Neurological:      Mental Status: He is alert.   Psychiatric:         Mood and Affect: Mood normal.          Additional Data:     Labs:  Results from last 7 days   Lab Units 03/27/24  0503 03/26/24  1525   WBC Thousand/uL 10.75* 11.20*   HEMOGLOBIN g/dL 13.5 14.0   HEMATOCRIT % 42.6 44.6   PLATELETS Thousands/uL 268 284   NEUTROS PCT %  --  63   LYMPHS PCT %  --  16   MONOS PCT %  --  14*   EOS PCT %  --  6     Results from last 7 days   Lab Units 03/29/24  1111 03/27/24  0503 03/26/24  1525   SODIUM mmol/L 139   < > 140   POTASSIUM mmol/L 3.2*   < > 3.6   CHLORIDE mmol/L 88*   < > 90*   CO2 mmol/L 43*   < > 43*   BUN mg/dL 15   < > 13   CREATININE mg/dL 0.73   < > 0.71   ANION GAP mmol/L 8   < > 7   CALCIUM mg/dL 9.2   < > 9.8   ALBUMIN g/dL  --   --  3.9   TOTAL BILIRUBIN mg/dL  --   --  0.44   ALK PHOS U/L  --   --   64   ALT U/L  --   --  29   AST U/L  --   --  36   GLUCOSE RANDOM mg/dL 150*   < > 87    < > = values in this interval not displayed.                 Results from last 7 days   Lab Units 03/27/24  0503   PROCALCITONIN ng/ml 0.07       Lines/Drains:  Invasive Devices       Peripheral Intravenous Line  Duration             Peripheral IV 03/26/24 Right Antecubital 3 days    Peripheral IV 03/26/24 Dorsal (posterior);Right Hand 2 days                      Telemetry:  Telemetry Orders (From admission, onward)               24 Hour Telemetry Monitoring  Continuous x 24 Hours (Telem)        Question:  Reason for 24 Hour Telemetry  Answer:  Arrhythmias requiring acute medical intervention / PPM or ICD malfunction                     Telemetry Reviewed: Normal Sinus Rhythm  Indication for Continued Telemetry Use: Acute CHF on >200 mg lasix/day or equivalent dose or with new reduced EF.              Imaging: No pertinent imaging reviewed.    Recent Cultures (last 7 days):         Last 24 Hours Medication List:   Current Facility-Administered Medications   Medication Dose Route Frequency Provider Last Rate    acetaminophen  650 mg Oral Q6H PRN Charisma Torres MD      albuterol  2 puff Inhalation 4x Daily Charisma Torres MD      albuterol  2.5 mg Nebulization Q6H PRN Charisma Torres MD      aspirin  81 mg Oral BID Charisma Torres MD      bismuth subsalicylate  524 mg Oral Q6H PRN Rigo Mota MD      cefazolin  2,000 mg Intravenous Q8H Charisma Torres MD 2,000 mg (03/29/24 0839)    cyanocobalamin  500 mcg Oral Daily Charisma Torres MD      enoxaparin  40 mg Subcutaneous Daily Charisma Torres MD      fluticasone  1 puff Inhalation Daily Charisma Torres MD      furosemide  15 mg/hr Intravenous Continuous Charisma Torres MD 15 mg/hr (03/29/24 5670)    guaiFENesin  600 mg Oral Q12H CHICHO Charisma Torres MD      magnesium Oxide  400 mg Oral Daily Loretta Vera, AMALIA      melatonin  3 mg Oral HS Carroll Beckford,       mometasone  2 spray Nasal BID Nacho Reilly  DO [employer]      multivitamin stress formula  1 tablet Oral Daily Charisma Torres MD      nicotine  21 mg Transdermal Daily Charisma Torres MD      potassium chloride  40 mEq Oral TID With Meals AMALIA Burgos      sodium chloride  1 spray Each Nare Q1H PRN Charisma Torres MD          Today, Patient Was Seen By: Nacho Hook DO    **Please Note: This note may have been constructed using a voice recognition system.**

## 2024-03-29 NOTE — SPEECH THERAPY NOTE
Speech Language/Pathology  Speech-Language Pathology Bedside Swallow Evaluation      Patient Name: Tommie Ramirez    Today's Date: 3/29/2024     Summary   Consult received for bedside swallow assessment. Pt admitted w/ acute on chronic CHF, cellulitis, and chronic respiratory failure/ COPD. PMHx includes tobacco abuse, HTN, and extensive cervical spondylosis, DDD, cervicalgia, and cervical spinal stenosis. SPO2 92%  Limited assessment d/t pt participation. Pt easily agitated therefore SLP allowed pt to direct goals of assessment. Seen w/ medications remaining from this morning as pt reported they are difficult to move posteriorly in oral cavity and initiate swallow. Pts kyphotic positioning likely the cause of this. Also suspect osteophytes/impingement on pharynx impacting pharyngeal phase of swallow. Pt reports he crushes or allows meds to dissolve at home. Pt agreeable to meds crushed by RN in pudding, however did not like the texture of meds w/ pudding. Agreeable next time to trial applesauce as the medication will blend better.    Pt currently on regular texture diet w/ minced/ground meat per his request. Pt is mostly edentulous, reports he is in the process of getting teeth extracted and dentures made. Pt declined lunch at this time stating he eats one meal a day in the evening. SLP answered various questions pt had in regards to swallowing. Pt reports nausea but that he cannot throw up. Denies coughing/choking w/ foods/drinks. No overt s/s aspiration observed w/ liquids or crushed meds.    Recommend to continue current diet  Meds crushed in applesauce, pt prefers one at a time so as not to mix tastes of meds  SLP will s/o as dysphagia is largely d/t lack of dentition and cervical disc disease.     Risk/s for Aspiration: Low     Recommended Diet: regular diet and thin liquids ; minced/ground meats  Recommended Form of Meds: crushed with puree   Aspiration precautions and swallowing strategies: upright  posture and alternating bites and sips  Other Recommendations: Continue frequent oral care        Current Medical Status    Tommie Ramirez is a 68 y.o. male who presents with worsening lower extremity swelling.  Patient states that last few weeks he has been gradually increasing his Lasix and is now up to 80 mg twice daily and despite that he has leg swelling and also some pinkish-reddish discoloration of his skin and concern for cellulitis and was started on antibiotics by family doctor recently.  He states that he chronically uses 4 L of oxygen all the time still using the same but he feels he is gets more winded and also the swelling is bothering him.  Denies any fevers or chills or chest pain.  States he watches the amount of salt he eats and also is still smoking     Current Precautions:  Fall      Allergies:  No known food allergies    Past medical history:  Please see H&P for details    Special Studies:  CXR:  Mildly limited exam. Minimal left basilar pleural effusion and basilar atelectasis.    Social/Education/Vocational Hx:  Pt lives with family    Swallow Information   Current Risks for Dysphagia & Aspiration: known history of dysphagia  Current Symptoms/Concerns: change in respiratory status  Current Diet: regular diet and thin liquids   Baseline Diet: regular diet and thin liquids      Baseline Assessment   Behavior/Cognition: alert  Speech/Language Status: able to participate in conversation  Patient Positioning: upright in recliner  Pain Status/Interventions/Response to Interventions:   No report of or nonverbal indications of pain.       Swallow Mechanism Exam  Facial: symmetrical  Labial: WFL  Lingual: WFL  Velum: symmetrical  Mandible: adequate ROM  Dentition: edentulous  Vocal quality:clear/adequate   Volitional Cough: strong/productive   Respiratory Status: on RA       Consistencies Assessed and Performance   Consistencies Administered: thin liquids and puree    Oral Stage: moderate  Pt  reports prolonged mastication and difficulty w/ AP movement d/t positioning    Pharyngeal Stage: suspected  No coughing, however pt reports intermittent globus sensation. Suspect related to cervical issues    Esophageal Concerns: globus sensation      Summary and Recommendations (see above)    Results Reviewed with: patient and RN     Treatment Recommended: None at this time     Lexie Muñoz MS CCC-SLP  3/29/2024

## 2024-03-29 NOTE — TREATMENT PLAN
Patient not assessed by me today as he informed the RN that he did not want people in his hospital room at the time that I was rounding.  Chart review performed. He is back on home O2 requirements. He has diuresed greater than negative 7 L since admission.  Recommend transition to PO furosemide 120 mg daily along with spironolactone 25 mg daily starting 3/30/24. Discontinue Lasix drip around 6 am on 3/30/24.  Plan reviewed with primary team.  Loretta HOLLAND

## 2024-03-30 LAB
ANION GAP SERPL CALCULATED.3IONS-SCNC: 7 MMOL/L (ref 4–13)
BUN SERPL-MCNC: 16 MG/DL (ref 5–25)
CALCIUM SERPL-MCNC: 8.9 MG/DL (ref 8.4–10.2)
CHLORIDE SERPL-SCNC: 91 MMOL/L (ref 96–108)
CO2 SERPL-SCNC: 37 MMOL/L (ref 21–32)
CREAT SERPL-MCNC: 0.72 MG/DL (ref 0.6–1.3)
GFR SERPL CREATININE-BSD FRML MDRD: 95 ML/MIN/1.73SQ M
GLUCOSE SERPL-MCNC: 110 MG/DL (ref 65–140)
MAGNESIUM SERPL-MCNC: 2.2 MG/DL (ref 1.9–2.7)
POTASSIUM SERPL-SCNC: 4.8 MMOL/L (ref 3.5–5.3)
SODIUM SERPL-SCNC: 135 MMOL/L (ref 135–147)

## 2024-03-30 PROCEDURE — 80048 BASIC METABOLIC PNL TOTAL CA: CPT | Performed by: HOSPITALIST

## 2024-03-30 PROCEDURE — 83735 ASSAY OF MAGNESIUM: CPT | Performed by: HOSPITALIST

## 2024-03-30 PROCEDURE — 99233 SBSQ HOSP IP/OBS HIGH 50: CPT | Performed by: HOSPITALIST

## 2024-03-30 RX ORDER — FUROSEMIDE 10 MG/ML
15 SYRINGE (ML) INJECTION CONTINUOUS
Status: DISCONTINUED | OUTPATIENT
Start: 2024-03-30 | End: 2024-03-31

## 2024-03-30 RX ADMIN — ALBUTEROL SULFATE 2 PUFF: 90 AEROSOL, METERED RESPIRATORY (INHALATION) at 17:32

## 2024-03-30 RX ADMIN — Medication 400 MG: at 11:19

## 2024-03-30 RX ADMIN — ASPIRIN 81 MG 81 MG: 81 TABLET ORAL at 21:02

## 2024-03-30 RX ADMIN — CEFAZOLIN SODIUM 2000 MG: 2 SOLUTION INTRAVENOUS at 17:31

## 2024-03-30 RX ADMIN — NICOTINE 21 MG: 21 PATCH, EXTENDED RELEASE TRANSDERMAL at 11:17

## 2024-03-30 RX ADMIN — ALBUTEROL SULFATE 2 PUFF: 90 AEROSOL, METERED RESPIRATORY (INHALATION) at 21:02

## 2024-03-30 RX ADMIN — CYANOCOBALAMIN TAB 500 MCG 500 MCG: 500 TAB at 11:19

## 2024-03-30 RX ADMIN — FLUTICASONE FUROATE 1 PUFF: 200 POWDER RESPIRATORY (INHALATION) at 11:33

## 2024-03-30 RX ADMIN — CEFAZOLIN SODIUM 2000 MG: 2 SOLUTION INTRAVENOUS at 03:27

## 2024-03-30 RX ADMIN — PAROXETINE HYDROCHLORIDE 2 SPRAY: 20 TABLET, FILM COATED ORAL at 11:34

## 2024-03-30 RX ADMIN — GUAIFENESIN 600 MG: 600 TABLET ORAL at 11:19

## 2024-03-30 RX ADMIN — CEFAZOLIN SODIUM 2000 MG: 2 SOLUTION INTRAVENOUS at 11:25

## 2024-03-30 RX ADMIN — PAROXETINE HYDROCHLORIDE 2 SPRAY: 20 TABLET, FILM COATED ORAL at 21:03

## 2024-03-30 RX ADMIN — Medication 15 MG/HR: at 12:20

## 2024-03-30 RX ADMIN — ASPIRIN 81 MG 81 MG: 81 TABLET ORAL at 11:19

## 2024-03-30 RX ADMIN — ALBUTEROL SULFATE 2 PUFF: 90 AEROSOL, METERED RESPIRATORY (INHALATION) at 11:53

## 2024-03-30 RX ADMIN — POTASSIUM CHLORIDE 40 MEQ: 1500 TABLET, EXTENDED RELEASE ORAL at 11:19

## 2024-03-30 NOTE — PROGRESS NOTES
[facility]  Progress Note  Name: Tommie Ramirez I  MRN: [id number]  Unit/Bed#: -[place] I Date of Admission: 3/26/2024   Date of Service: 3/30/2024 I Hospital Day: 4    Assessment/Plan   * Acute on chronic diastolic (congestive) heart failure (HCC)  Assessment & Plan  Wt Readings from Last 3 Encounters:   03/30/24 122 kg (269 lb 9.6 oz)   07/05/23 133 kg (292 lb 15.9 oz)   01/05/23 131 kg (289 lb 0.4 oz)     Worsening lower extremity edema, takes home diuretics, there is a question of fluid/dietary compliance  Was started on IV twice daily diuretics, not significant response and has been subsequently started on a Lasix drip  Echocardiogram showed ejection fraction 75% with diastolic disease  Trend electrolytes and replete as needed  Monitor I's and O's and daily weights -- good net UOP on gtt, weight is below prior baseline (last in chart was October last year 129kg, he reports losing weight as outpatient)  Discussed with cardiology, their recommendations were for transition to oral diuretics with 100 mg Lasix and spironolactone 25 mg on 3/30.  Discussed these recommendations with the patient he was hesitant, believing he still felt his legs were more swollen than his baseline decided to continue Lasix, we agreed to continue for another day and reassess in the morning          Cellulitis of left lower extremity  Assessment & Plan  Reports he was recently started on antibiotics as an outpatient  Seems to have improved with diuresis and IV Ancef  Will likely continue 10-day course of oral antibiotics upon discharge with oral cephalosporin    Chronic respiratory failure with hypoxia (HCC)  Assessment & Plan  On admission states that he uses 4 L of oxygen at baseline 24/7 secondary to his CHF and COPD  Patient now stating that he was previously on 2.5 to 3 L roughly a month ago, and only more recently has been using 4 L of oxygen, however on chart review noted in multiple locations  stating he is on baseline 3 to 4 L of oxygen, which matches his oxygen prescription  Currently at 3 L, however he does not feel at his baseline subjectively    Tobacco abuse  Assessment & Plan  Smoking cessation and placed on nicotine replacement therapy is currently smoking around 1 pack/day and trying to gradually cut back    Essential hypertension  Assessment & Plan  Blood Pressures currently well-controlled hold Benicar for now as he is getting active diuresis    COPD, severe (HCC)  Assessment & Plan  Not in exacerbation continue home meds               VTE Pharmacologic Prophylaxis:   Moderate Risk (Score 3-4) - Pharmacological DVT Prophylaxis Ordered: enoxaparin (Lovenox).    Mobility:   Basic Mobility Inpatient Raw Score: 18  JH-HLM Goal: 6: Walk 10 steps or more  JH-HLM Achieved: 5: Stand (1 or more minutes)  JH-HLM Goal NOT achieved. Continue with multidisciplinary rounding and encourage appropriate mobility to improve upon JH-HLM goals.    Patient Centered Rounds: I performed bedside rounds with nursing staff today.   Discussions with Specialists or Other Care Team Provider: none    Education and Discussions with Family / Patient: Updated  (wife) at bedside.    Total Time Spent on Date of Encounter in care of patient: 40 mins. This time was spent on one or more of the following: performing physical exam; counseling and coordination of care; obtaining or reviewing history; documenting in the medical record; reviewing/ordering tests, medications or procedures; communicating with other healthcare professionals and discussing with patient's family/caregivers.    Current Length of Stay: 4 day(s)  Current Patient Status: Inpatient   Certification Statement: The patient will continue to require additional inpatient hospital stay due to chf  Discharge Plan: Anticipate discharge tomorrow to home.    Code Status: Level 1 - Full Code    Subjective:   Patient reports that he does not feel at his baseline  breathing, we did note some improvement.  He still feels he has notable swelling of his lower extremities beyond what is typical for him.    Objective:     Vitals:   Temp (24hrs), Av.5 °F (36.4 °C), Min:97.3 °F (36.3 °C), Max:97.5 °F (36.4 °C)    Temp:  [97.3 °F (36.3 °C)-97.5 °F (36.4 °C)] 97.3 °F (36.3 °C)  HR:  [78-92] 80  Resp:  [18] 18  BP: (109-135)/(68-74) 134/68  SpO2:  [91 %-96 %] 94 %  Body mass index is 37.6 kg/m².     Input and Output Summary (last 24 hours):     Intake/Output Summary (Last 24 hours) at 3/30/2024 1145  Last data filed at 3/30/2024 0814  Gross per 24 hour   Intake 1010 ml   Output 1750 ml   Net -740 ml       Physical Exam:   Physical Exam  Vitals and nursing note reviewed.   Constitutional:       General: He is not in acute distress.     Appearance: He is well-developed.      Comments: Chronically ill-appearing   HENT:      Head: Normocephalic and atraumatic.   Eyes:      Conjunctiva/sclera: Conjunctivae normal.   Neck:      Comments: Cervical kyphotic appearing position of the neck best forward (history cervical disease)  Cardiovascular:      Rate and Rhythm: Normal rate and regular rhythm.      Heart sounds: No murmur heard.  Pulmonary:      Effort: Pulmonary effort is normal. No respiratory distress.      Comments: Decreased breath sounds throughout, no wheezing or rhonchi  Abdominal:      Palpations: Abdomen is soft.      Tenderness: There is no abdominal tenderness.   Musculoskeletal:         General: No swelling.      Right lower leg: Edema present.      Left lower leg: Edema present.      Comments: 1-2+ pitting edema bilaterally lower extremities   Skin:     General: Skin is warm and dry.   Neurological:      Mental Status: He is alert.   Psychiatric:         Mood and Affect: Mood normal.          Additional Data:     Labs:  Results from last 7 days   Lab Units 24  0503 24  1525   WBC Thousand/uL 10.75* 11.20*   HEMOGLOBIN g/dL 13.5 14.0   HEMATOCRIT % 42.6 44.6    PLATELETS Thousands/uL 268 284   NEUTROS PCT %  --  63   LYMPHS PCT %  --  16   MONOS PCT %  --  14*   EOS PCT %  --  6     Results from last 7 days   Lab Units 03/30/24  0511 03/27/24  0503 03/26/24  1525   SODIUM mmol/L 135   < > 140   POTASSIUM mmol/L 4.8   < > 3.6   CHLORIDE mmol/L 91*   < > 90*   CO2 mmol/L 37*   < > 43*   BUN mg/dL 16   < > 13   CREATININE mg/dL 0.72   < > 0.71   ANION GAP mmol/L 7   < > 7   CALCIUM mg/dL 8.9   < > 9.8   ALBUMIN g/dL  --   --  3.9   TOTAL BILIRUBIN mg/dL  --   --  0.44   ALK PHOS U/L  --   --  64   ALT U/L  --   --  29   AST U/L  --   --  36   GLUCOSE RANDOM mg/dL 110   < > 87    < > = values in this interval not displayed.                 Results from last 7 days   Lab Units 03/27/24  0503   PROCALCITONIN ng/ml 0.07       Lines/Drains:  Invasive Devices       Peripheral Intravenous Line  Duration             Peripheral IV 03/26/24 Dorsal (posterior);Right Hand 3 days    Peripheral IV 03/26/24 Right Antecubital 3 days                                 Imaging: No pertinent imaging reviewed.    Recent Cultures (last 7 days):         Last 24 Hours Medication List:   Current Facility-Administered Medications   Medication Dose Route Frequency Provider Last Rate    acetaminophen  650 mg Oral Q6H PRN Charisma Torres MD      albuterol  2 puff Inhalation 4x Daily Charisma Torres MD      albuterol  2.5 mg Nebulization Q6H PRN Charisma Torres MD      aspirin  81 mg Oral BID Charisma Torres MD      bismuth subsalicylate  524 mg Oral Q6H PRN Rigo Mota MD      cefazolin  2,000 mg Intravenous Q8H Charisma Torres MD 2,000 mg (03/30/24 1125)    cyanocobalamin  500 mcg Oral Daily Charisma Torres MD      enoxaparin  40 mg Subcutaneous Daily Charisma Torres MD      fluticasone  1 puff Inhalation Daily Charisma Torres MD      furosemide  15 mg/hr Intravenous Continuous Nacho Hook DO      guaiFENesin  600 mg Oral Q12H CHICHO Charisma Torres MD      magnesium Oxide  400 mg Oral Daily AMALIA Burgos       melatonin  3 mg Oral HS Carroll Beckford DO      mometasone  2 spray Nasal BID Nacho Hook DO      multivitamin stress formula  1 tablet Oral Daily Charisma Torres MD      nicotine  21 mg Transdermal Daily Charisma Torres MD      sodium chloride  1 spray Each Nare Q1H PRN Charisma Torres MD          Today, Patient Was Seen By: Nacho Hook DO    **Please Note: This note may have been constructed using a voice recognition system.**

## 2024-03-30 NOTE — PLAN OF CARE
Problem: PAIN - ADULT  Goal: Verbalizes/displays adequate comfort level or baseline comfort level  Description: Interventions:  - Encourage patient to monitor pain and request assistance  - Assess pain using appropriate pain scale  - Administer analgesics based on type and severity of pain and evaluate response  - Implement non-pharmacological measures as appropriate and evaluate response  - Consider cultural and social influences on pain and pain management  - Notify physician/advanced practitioner if interventions unsuccessful or patient reports new pain  Outcome: Progressing     Problem: INFECTION - ADULT  Goal: Absence or prevention of progression during hospitalization  Description: INTERVENTIONS:  - Assess and monitor for signs and symptoms of infection  - Monitor lab/diagnostic results  - Monitor all insertion sites, i.e. indwelling lines, tubes, and drains  - Monitor endotracheal if appropriate and nasal secretions for changes in amount and color  - [place] appropriate cooling/warming therapies per order  - Administer medications as ordered  - Instruct and encourage patient and family to use good hand hygiene technique  - Identify and instruct in appropriate isolation precautions for identified infection/condition  Outcome: Progressing     Problem: SAFETY ADULT  Goal: Patient will remain free of falls  Description: INTERVENTIONS:  - Educate patient/family on patient safety including physical limitations  - Instruct patient to call for assistance with activity   - Consult OT/PT to assist with strengthening/mobility   - Keep Call bell within reach  - Keep bed low and locked with side rails adjusted as appropriate  - Keep care items and personal belongings within reach  - Initiate and maintain comfort rounds  - Make Fall Risk Sign visible to staff  - Offer Toileting every 2 Hours, in advance of need  - Apply yellow socks and bracelet for high fall risk patients  - Consider moving patient to room near nurses  station  Outcome: Progressing  Goal: Maintain or return to baseline ADL function  Description: INTERVENTIONS:  -  Assess patient's ability to carry out ADLs; assess patient's baseline for ADL function and identify physical deficits which impact ability to perform ADLs (bathing, care of mouth/teeth, toileting, grooming, dressing, etc.)  - Assess/evaluate cause of self-care deficits   - Assess range of motion  - Assess patient's mobility; develop plan if impaired  - Assess patient's need for assistive devices and provide as appropriate  - Encourage maximum independence but intervene and supervise when necessary  - Involve family in performance of ADLs  - Assess for home care needs following discharge   - Consider OT consult to assist with ADL evaluation and planning for discharge  - Provide patient education as appropriate  Outcome: Progressing  Goal: Maintains/Returns to pre admission functional level  Description: INTERVENTIONS:  - Perform AM-PAC 6 Click Basic Mobility/ Daily Activity assessment daily.  - Set and communicate daily mobility goal to care team and patient/family/caregiver.   - Collaborate with rehabilitation services on mobility goals if consulted  - Perform Range of Motion 3 times a day.  - Reposition patient every 2 hours.  - Dangle patient 3 times a day  - Stand patient 3 times a day  - Ambulate patient 3 times a day  - Out of bed to chair 3 times a day   - Out of bed for meals 3 times a day  - Out of bed for toileting  - Record patient progress and toleration of activity level   Outcome: Progressing     Problem: DISCHARGE PLANNING  Goal: Discharge to home or other facility with appropriate resources  Description: INTERVENTIONS:  - Identify barriers to discharge w/patient and caregiver  - Arrange for needed discharge resources and transportation as appropriate  - Identify discharge learning needs (meds, wound care, etc.)  - Arrange for interpretive services to assist at discharge as needed  - Refer  to Case Management Department for coordinating discharge planning if the patient needs post-hospital services based on physician/advanced practitioner order or complex needs related to functional status, cognitive ability, or social support system  Outcome: Progressing     Problem: Knowledge Deficit  Goal: Patient/family/caregiver demonstrates understanding of disease process, treatment plan, medications, and discharge instructions  Description: Complete learning assessment and assess knowledge base.  Interventions:  - Provide teaching at level of understanding  - Provide teaching via preferred learning methods  Outcome: Progressing     Problem: CARDIOVASCULAR - ADULT  Goal: Maintains optimal cardiac output and hemodynamic stability  Description: INTERVENTIONS:  - Monitor I/O, vital signs and rhythm  - Monitor for S/S and trends of decreased cardiac output  - Administer and titrate ordered vasoactive medications to optimize hemodynamic stability  - Assess quality of pulses, skin color and temperature  - Assess for signs of decreased coronary artery perfusion  - Instruct patient to report change in severity of symptoms  Outcome: Progressing  Goal: Absence of cardiac dysrhythmias or at baseline rhythm  Description: INTERVENTIONS:  - Continuous cardiac monitoring, vital signs, obtain 12 lead EKG if ordered  - Administer antiarrhythmic and heart rate control medications as ordered  - Monitor electrolytes and administer replacement therapy as ordered  Outcome: Progressing     Problem: RESPIRATORY - ADULT  Goal: Achieves optimal ventilation and oxygenation  Description: INTERVENTIONS:  - Assess for changes in respiratory status  - Assess for changes in mentation and behavior  - Position to facilitate oxygenation and minimize respiratory effort  - Oxygen administered by appropriate delivery if ordered  - Initiate smoking cessation education as indicated  - Encourage broncho-pulmonary hygiene including cough, deep breathe,  Incentive Spirometry  - Assess the need for suctioning and aspirate as needed  - Assess and instruct to report SOB or any respiratory difficulty  - Respiratory Therapy support as indicated  Outcome: Progressing     Problem: MOBILITY - ADULT  Goal: Maintain or return to baseline ADL function  Description: INTERVENTIONS:  -  Assess patient's ability to carry out ADLs; assess patient's baseline for ADL function and identify physical deficits which impact ability to perform ADLs (bathing, care of mouth/teeth, toileting, grooming, dressing, etc.)  - Assess/evaluate cause of self-care deficits   - Assess range of motion  - Assess patient's mobility; develop plan if impaired  - Assess patient's need for assistive devices and provide as appropriate  - Encourage maximum independence but intervene and supervise when necessary  - Involve family in performance of ADLs  - Assess for home care needs following discharge   - Consider OT consult to assist with ADL evaluation and planning for discharge  - Provide patient education as appropriate  Outcome: Progressing  Goal: Maintains/Returns to pre admission functional level  Description: INTERVENTIONS:  - Perform AM-PAC 6 Click Basic Mobility/ Daily Activity assessment daily.  - Set and communicate daily mobility goal to care team and patient/family/caregiver.   - Collaborate with rehabilitation services on mobility goals if consulted  - Perform Range of Motion 3 times a day.  - Reposition patient every 2 hours.  - Dangle patient 3 times a day  - Stand patient 3 times a day  - Ambulate patient 3 times a day  - Out of bed to chair 3 times a day   - Out of bed for meals 3 times a day  - Out of bed for toileting  - Record patient progress and toleration of activity level   Outcome: Progressing     Problem: Potential for Falls  Goal: Patient will remain free of falls  Description: INTERVENTIONS:  - Educate patient/family on patient safety including physical limitations  - Instruct  patient to call for assistance with activity   - Consult OT/PT to assist with strengthening/mobility   - Keep Call bell within reach  - Keep bed low and locked with side rails adjusted as appropriate  - Keep care items and personal belongings within reach  - Initiate and maintain comfort rounds  - Make Fall Risk Sign visible to staff  - Offer Toileting every 2 Hours, in advance of need  - Apply yellow socks and bracelet for high fall risk patients  - Consider moving patient to room near nurses station  Outcome: Progressing     Problem: Prexisting or High Potential for Compromised Skin Integrity  Goal: Skin integrity is maintained or improved  Description: INTERVENTIONS:  - Identify patients at risk for skin breakdown  - Assess and monitor skin integrity  - Assess and monitor nutrition and hydration status  - Monitor labs   - Assess for incontinence   - Turn and reposition patient  - Assist with mobility/ambulation  - Relieve pressure over bony prominences  - Avoid friction and shearing  - Provide appropriate hygiene as needed including keeping skin clean and dry  - Evaluate need for skin moisturizer/barrier cream  - Collaborate with interdisciplinary team   - Patient/family teaching  - Consider wound care consult   Outcome: Progressing

## 2024-03-30 NOTE — PLAN OF CARE
Problem: PAIN - ADULT  Goal: Verbalizes/displays adequate comfort level or baseline comfort level  Description: Interventions:  - Encourage patient to monitor pain and request assistance  - Assess pain using appropriate pain scale  - Administer analgesics based on type and severity of pain and evaluate response  - Implement non-pharmacological measures as appropriate and evaluate response  - Consider cultural and social influences on pain and pain management  - Notify physician/advanced practitioner if interventions unsuccessful or patient reports new pain  Outcome: Progressing     Problem: INFECTION - ADULT  Goal: Absence or prevention of progression during hospitalization  Description: INTERVENTIONS:  - Assess and monitor for signs and symptoms of infection  - Monitor lab/diagnostic results  - Monitor all insertion sites, i.e. indwelling lines, tubes, and drains  - Monitor endotracheal if appropriate and nasal secretions for changes in amount and color  - [place] appropriate cooling/warming therapies per order  - Administer medications as ordered  - Instruct and encourage patient and family to use good hand hygiene technique  - Identify and instruct in appropriate isolation precautions for identified infection/condition  Outcome: Progressing     Problem: DISCHARGE PLANNING  Goal: Discharge to home or other facility with appropriate resources  Description: INTERVENTIONS:  - Identify barriers to discharge w/patient and caregiver  - Arrange for needed discharge resources and transportation as appropriate  - Identify discharge learning needs (meds, wound care, etc.)  - Arrange for interpretive services to assist at discharge as needed  - Refer to Case Management Department for coordinating discharge planning if the patient needs post-hospital services based on physician/advanced practitioner order or complex needs related to functional status, cognitive ability, or social support system  Outcome: Progressing      Problem: Knowledge Deficit  Goal: Patient/family/caregiver demonstrates understanding of disease process, treatment plan, medications, and discharge instructions  Description: Complete learning assessment and assess knowledge base.  Interventions:  - Provide teaching at level of understanding  - Provide teaching via preferred learning methods  Outcome: Progressing     Problem: CARDIOVASCULAR - ADULT  Goal: Maintains optimal cardiac output and hemodynamic stability  Description: INTERVENTIONS:  - Monitor I/O, vital signs and rhythm  - Monitor for S/S and trends of decreased cardiac output  - Administer and titrate ordered vasoactive medications to optimize hemodynamic stability  - Assess quality of pulses, skin color and temperature  - Assess for signs of decreased coronary artery perfusion  - Instruct patient to report change in severity of symptoms  Outcome: Progressing  Goal: Absence of cardiac dysrhythmias or at baseline rhythm  Description: INTERVENTIONS:  - Continuous cardiac monitoring, vital signs, obtain 12 lead EKG if ordered  - Administer antiarrhythmic and heart rate control medications as ordered  - Monitor electrolytes and administer replacement therapy as ordered  Outcome: Progressing     Problem: RESPIRATORY - ADULT  Goal: Achieves optimal ventilation and oxygenation  Description: INTERVENTIONS:  - Assess for changes in respiratory status  - Assess for changes in mentation and behavior  - Position to facilitate oxygenation and minimize respiratory effort  - Oxygen administered by appropriate delivery if ordered  - Initiate smoking cessation education as indicated  - Encourage broncho-pulmonary hygiene including cough, deep breathe, Incentive Spirometry  - Assess the need for suctioning and aspirate as needed  - Assess and instruct to report SOB or any respiratory difficulty  - Respiratory Therapy support as indicated  Outcome: Progressing

## 2024-03-30 NOTE — ASSESSMENT & PLAN NOTE
On admission states that he uses 4 L of oxygen at baseline 24/7 secondary to his CHF and COPD  Patient now stating that he was previously on 2.5 to 3 L roughly a month ago, and only more recently has been using 4 L of oxygen, however on chart review noted in multiple locations stating he is on baseline 3 to 4 L of oxygen, which matches his oxygen prescription  Currently at 3 L, however he does not feel at his baseline subjectively

## 2024-03-30 NOTE — ASSESSMENT & PLAN NOTE
Wt Readings from Last 3 Encounters:   03/30/24 122 kg (269 lb 9.6 oz)   07/05/23 133 kg (292 lb 15.9 oz)   01/05/23 131 kg (289 lb 0.4 oz)     Worsening lower extremity edema, takes home diuretics, there is a question of fluid/dietary compliance  Was started on IV twice daily diuretics, not significant response and has been subsequently started on a Lasix drip  Echocardiogram showed ejection fraction 75% with diastolic disease  Trend electrolytes and replete as needed  Monitor I's and O's and daily weights -- good net UOP on gtt, weight is below prior baseline (last in chart was October last year 129kg, he reports losing weight as outpatient)  Discussed with cardiology, their recommendations were for transition to oral diuretics with 100 mg Lasix and spironolactone 25 mg on 3/30.  Discussed these recommendations with the patient he was hesitant, believing he still felt his legs were more swollen than his baseline decided to continue Lasix, we agreed to continue for another day and reassess in the morning

## 2024-03-30 NOTE — PLAN OF CARE
Problem: PAIN - ADULT  Goal: Verbalizes/displays adequate comfort level or baseline comfort level  Description: Interventions:  - Encourage patient to monitor pain and request assistance  - Assess pain using appropriate pain scale  - Administer analgesics based on type and severity of pain and evaluate response  - Implement non-pharmacological measures as appropriate and evaluate response  - Consider cultural and social influences on pain and pain management  - Notify physician/advanced practitioner if interventions unsuccessful or patient reports new pain  Outcome: Progressing     Problem: INFECTION - ADULT  Goal: Absence or prevention of progression during hospitalization  Description: INTERVENTIONS:  - Assess and monitor for signs and symptoms of infection  - Monitor lab/diagnostic results  - Monitor all insertion sites, i.e. indwelling lines, tubes, and drains  - Monitor endotracheal if appropriate and nasal secretions for changes in amount and color  - Frederic appropriate cooling/warming therapies per order  - Administer medications as ordered  - Instruct and encourage patient and family to use good hand hygiene technique  - Identify and instruct in appropriate isolation precautions for identified infection/condition  Outcome: Progressing     Problem: SAFETY ADULT  Goal: Patient will remain free of falls  Description: INTERVENTIONS:  - Educate patient/family on patient safety including physical limitations  - Instruct patient to call for assistance with activity   - Consult OT/PT to assist with strengthening/mobility   - Keep Call bell within reach  - Keep bed low and locked with side rails adjusted as appropriate  - Keep care items and personal belongings within reach  - Initiate and maintain comfort rounds  - Make Fall Risk Sign visible to staff    - Apply yellow socks and bracelet for high fall risk patients  - Consider moving patient to room near nurses station  Outcome: Progressing  Goal: Maintain or  return to baseline ADL function  Description: INTERVENTIONS:  -  Assess patient's ability to carry out ADLs; assess patient's baseline for ADL function and identify physical deficits which impact ability to perform ADLs (bathing, care of mouth/teeth, toileting, grooming, dressing, etc.)  - Assess/evaluate cause of self-care deficits   - Assess range of motion  - Assess patient's mobility; develop plan if impaired  - Assess patient's need for assistive devices and provide as appropriate  - Encourage maximum independence but intervene and supervise when necessary  - Involve family in performance of ADLs  - Assess for home care needs following discharge   - Consider OT consult to assist with ADL evaluation and planning for discharge  - Provide patient education as appropriate  Outcome: Progressing  Goal: Maintains/Returns to pre admission functional level  Description: INTERVENTIONS:  - Perform AM-PAC 6 Click Basic Mobility/ Daily Activity assessment daily.  - Set and communicate daily mobility goal to care team and patient/family/caregiver.   - Collaborate with rehabilitation services on mobility goals if consulted    - Out of bed for toileting  - Record patient progress and toleration of activity level   Outcome: Progressing     Problem: DISCHARGE PLANNING  Goal: Discharge to home or other facility with appropriate resources  Description: INTERVENTIONS:  - Identify barriers to discharge w/patient and caregiver  - Arrange for needed discharge resources and transportation as appropriate  - Identify discharge learning needs (meds, wound care, etc.)  - Arrange for interpretive services to assist at discharge as needed  - Refer to Case Management Department for coordinating discharge planning if the patient needs post-hospital services based on physician/advanced practitioner order or complex needs related to functional status, cognitive ability, or social support system  Outcome: Progressing     Problem: Knowledge  Deficit  Goal: Patient/family/caregiver demonstrates understanding of disease process, treatment plan, medications, and discharge instructions  Description: Complete learning assessment and assess knowledge base.  Interventions:  - Provide teaching at level of understanding  - Provide teaching via preferred learning methods  Outcome: Progressing     Problem: CARDIOVASCULAR - ADULT  Goal: Maintains optimal cardiac output and hemodynamic stability  Description: INTERVENTIONS:  - Monitor I/O, vital signs and rhythm  - Monitor for S/S and trends of decreased cardiac output  - Administer and titrate ordered vasoactive medications to optimize hemodynamic stability  - Assess quality of pulses, skin color and temperature  - Assess for signs of decreased coronary artery perfusion  - Instruct patient to report change in severity of symptoms  Outcome: Progressing  Goal: Absence of cardiac dysrhythmias or at baseline rhythm  Description: INTERVENTIONS:  - Continuous cardiac monitoring, vital signs, obtain 12 lead EKG if ordered  - Administer antiarrhythmic and heart rate control medications as ordered  - Monitor electrolytes and administer replacement therapy as ordered  Outcome: Progressing     Problem: RESPIRATORY - ADULT  Goal: Achieves optimal ventilation and oxygenation  Description: INTERVENTIONS:  - Assess for changes in respiratory status  - Assess for changes in mentation and behavior  - Position to facilitate oxygenation and minimize respiratory effort  - Oxygen administered by appropriate delivery if ordered  - Initiate smoking cessation education as indicated  - Encourage broncho-pulmonary hygiene including cough, deep breathe, Incentive Spirometry  - Assess the need for suctioning and aspirate as needed  - Assess and instruct to report SOB or any respiratory difficulty  - Respiratory Therapy support as indicated  Outcome: Progressing     Problem: MOBILITY - ADULT  Goal: Maintain or return to baseline ADL  function  Description: INTERVENTIONS:  -  Assess patient's ability to carry out ADLs; assess patient's baseline for ADL function and identify physical deficits which impact ability to perform ADLs (bathing, care of mouth/teeth, toileting, grooming, dressing, etc.)  - Assess/evaluate cause of self-care deficits   - Assess range of motion  - Assess patient's mobility; develop plan if impaired  - Assess patient's need for assistive devices and provide as appropriate  - Encourage maximum independence but intervene and supervise when necessary  - Involve family in performance of ADLs  - Assess for home care needs following discharge   - Consider OT consult to assist with ADL evaluation and planning for discharge  - Provide patient education as appropriate  Outcome: Progressing  Goal: Maintains/Returns to pre admission functional level  Description: INTERVENTIONS:  - Perform AM-PAC 6 Click Basic Mobility/ Daily Activity assessment daily.  - Set and communicate daily mobility goal to care team and patient/family/caregiver.   - Collaborate with rehabilitation services on mobility goals if consulted      - Out of bed for toileting  - Record patient progress and toleration of activity level   Outcome: Progressing     Problem: Potential for Falls  Goal: Patient will remain free of falls  Description: INTERVENTIONS:  - Educate patient/family on patient safety including physical limitations  - Instruct patient to call for assistance with activity   - Consult OT/PT to assist with strengthening/mobility   - Keep Call bell within reach  - Keep bed low and locked with side rails adjusted as appropriate  - Keep care items and personal belongings within reach  - Initiate and maintain comfort rounds  - Make Fall Risk Sign visible to staff    - Apply yellow socks and bracelet for high fall risk patients  - Consider moving patient to room near nurses station  Outcome: Progressing

## 2024-03-30 NOTE — NURSING NOTE
"Entered patient's room to administer morning medications. Explained to patient the doctor ordered to discontinue the IV lasix. Patient stated he did not think it was a good idea to discontinue it until he talked to the doctor. Message sent to Dr Hook regarding same. Explained to patient I needed to turn the lights on to read the meds and lights turned on. Patient asked if I could keep the lights off until I was ready to give them and I explained to the patient that I needed to see them while I prepared them but that I would turn them off as soon as I could. Patient stated to this nurse that he does not appreciate a \"condescending attitude\". I explained that I am trying to prepare his meds and explain why I needed the lights. He told this nurse I was already getting off on the wrong foot and then I would blame him. I again explained I am trying to prepare and administer his meds. He stated I was being a problem. I explained that I am going to leave at this time and left the patient's room.  "

## 2024-03-31 VITALS
RESPIRATION RATE: 16 BRPM | DIASTOLIC BLOOD PRESSURE: 76 MMHG | OXYGEN SATURATION: 95 % | HEIGHT: 71 IN | TEMPERATURE: 97.3 F | BODY MASS INDEX: 37.46 KG/M2 | SYSTOLIC BLOOD PRESSURE: 130 MMHG | HEART RATE: 81 BPM | WEIGHT: 267.6 LBS

## 2024-03-31 LAB
ANION GAP SERPL CALCULATED.3IONS-SCNC: 7 MMOL/L (ref 4–13)
BUN SERPL-MCNC: 15 MG/DL (ref 5–25)
CALCIUM SERPL-MCNC: 9.1 MG/DL (ref 8.4–10.2)
CHLORIDE SERPL-SCNC: 90 MMOL/L (ref 96–108)
CO2 SERPL-SCNC: 44 MMOL/L (ref 21–32)
CREAT SERPL-MCNC: 0.62 MG/DL (ref 0.6–1.3)
GFR SERPL CREATININE-BSD FRML MDRD: 101 ML/MIN/1.73SQ M
GLUCOSE SERPL-MCNC: 115 MG/DL (ref 65–140)
POTASSIUM SERPL-SCNC: 2.9 MMOL/L (ref 3.5–5.3)
POTASSIUM SERPL-SCNC: 3.7 MMOL/L (ref 3.5–5.3)
SODIUM SERPL-SCNC: 141 MMOL/L (ref 135–147)

## 2024-03-31 PROCEDURE — 84132 ASSAY OF SERUM POTASSIUM: CPT | Performed by: HOSPITALIST

## 2024-03-31 PROCEDURE — NC001 PR NO CHARGE: Performed by: HOSPITALIST

## 2024-03-31 PROCEDURE — 99239 HOSP IP/OBS DSCHRG MGMT >30: CPT | Performed by: HOSPITALIST

## 2024-03-31 PROCEDURE — 80048 BASIC METABOLIC PNL TOTAL CA: CPT | Performed by: HOSPITALIST

## 2024-03-31 RX ORDER — CEPHALEXIN 500 MG/1
500 CAPSULE ORAL EVERY 6 HOURS SCHEDULED
Status: DISCONTINUED | OUTPATIENT
Start: 2024-03-31 | End: 2024-03-31 | Stop reason: HOSPADM

## 2024-03-31 RX ORDER — CEPHALEXIN 500 MG/1
500 CAPSULE ORAL EVERY 6 HOURS SCHEDULED
Qty: 19 CAPSULE | Refills: 0 | Status: SHIPPED | OUTPATIENT
Start: 2024-03-31 | End: 2024-04-05

## 2024-03-31 RX ORDER — SPIRONOLACTONE 25 MG/1
25 TABLET ORAL DAILY
Status: DISCONTINUED | OUTPATIENT
Start: 2024-03-31 | End: 2024-03-31 | Stop reason: HOSPADM

## 2024-03-31 RX ORDER — SPIRONOLACTONE 25 MG/1
25 TABLET ORAL DAILY
Qty: 30 TABLET | Refills: 0 | Status: SHIPPED | OUTPATIENT
Start: 2024-04-01

## 2024-03-31 RX ORDER — POTASSIUM CHLORIDE 14.9 MG/ML
20 INJECTION INTRAVENOUS
Qty: 200 ML | Refills: 0 | Status: COMPLETED | OUTPATIENT
Start: 2024-03-31 | End: 2024-03-31

## 2024-03-31 RX ORDER — FUROSEMIDE 40 MG/1
120 TABLET ORAL DAILY
Qty: 90 TABLET | Refills: 0 | Status: SHIPPED | OUTPATIENT
Start: 2024-04-01 | End: 2024-05-01

## 2024-03-31 RX ORDER — CEPHALEXIN 500 MG/1
500 CAPSULE ORAL EVERY 6 HOURS SCHEDULED
Status: DISCONTINUED | OUTPATIENT
Start: 2024-03-31 | End: 2024-03-31

## 2024-03-31 RX ORDER — POTASSIUM CHLORIDE 20 MEQ/1
40 TABLET, EXTENDED RELEASE ORAL ONCE
Status: COMPLETED | OUTPATIENT
Start: 2024-03-31 | End: 2024-03-31

## 2024-03-31 RX ADMIN — POTASSIUM CHLORIDE 20 MEQ: 14.9 INJECTION, SOLUTION INTRAVENOUS at 09:01

## 2024-03-31 RX ADMIN — ASPIRIN 81 MG 81 MG: 81 TABLET ORAL at 08:04

## 2024-03-31 RX ADMIN — PAROXETINE HYDROCHLORIDE 2 SPRAY: 20 TABLET, FILM COATED ORAL at 08:07

## 2024-03-31 RX ADMIN — FUROSEMIDE 120 MG: 80 TABLET ORAL at 14:47

## 2024-03-31 RX ADMIN — CEFAZOLIN SODIUM 2000 MG: 2 SOLUTION INTRAVENOUS at 01:12

## 2024-03-31 RX ADMIN — Medication 1 TABLET: at 08:03

## 2024-03-31 RX ADMIN — ALBUTEROL SULFATE 2 PUFF: 90 AEROSOL, METERED RESPIRATORY (INHALATION) at 08:14

## 2024-03-31 RX ADMIN — Medication 400 MG: at 08:03

## 2024-03-31 RX ADMIN — CEFAZOLIN SODIUM 2000 MG: 2 SOLUTION INTRAVENOUS at 08:06

## 2024-03-31 RX ADMIN — FLUTICASONE FUROATE 1 PUFF: 200 POWDER RESPIRATORY (INHALATION) at 08:04

## 2024-03-31 RX ADMIN — SPIRONOLACTONE 25 MG: 25 TABLET ORAL at 14:47

## 2024-03-31 RX ADMIN — POTASSIUM CHLORIDE 40 MEQ: 1500 TABLET, EXTENDED RELEASE ORAL at 08:04

## 2024-03-31 RX ADMIN — ALBUTEROL SULFATE 2 PUFF: 90 AEROSOL, METERED RESPIRATORY (INHALATION) at 17:58

## 2024-03-31 RX ADMIN — POTASSIUM CHLORIDE 20 MEQ: 14.9 INJECTION, SOLUTION INTRAVENOUS at 11:06

## 2024-03-31 RX ADMIN — CYANOCOBALAMIN TAB 500 MCG 500 MCG: 500 TAB at 08:04

## 2024-03-31 RX ADMIN — CEPHALEXIN 500 MG: 500 CAPSULE ORAL at 14:47

## 2024-03-31 RX ADMIN — NICOTINE 21 MG: 21 PATCH, EXTENDED RELEASE TRANSDERMAL at 08:05

## 2024-03-31 RX ADMIN — ACETAMINOPHEN 325MG 650 MG: 325 TABLET ORAL at 06:00

## 2024-03-31 RX ADMIN — ALBUTEROL SULFATE 2 PUFF: 90 AEROSOL, METERED RESPIRATORY (INHALATION) at 11:06

## 2024-03-31 NOTE — NURSING NOTE
AVS reviewed virtually with patient. Patient verbalized understanding of same, including medications, follow-up appointments, and reasons to seek medical assistance. All questions answered.

## 2024-03-31 NOTE — PLAN OF CARE
Problem: PAIN - ADULT  Goal: Verbalizes/displays adequate comfort level or baseline comfort level  Description: Interventions:  - Encourage patient to monitor pain and request assistance  - Assess pain using appropriate pain scale  - Administer analgesics based on type and severity of pain and evaluate response  - Implement non-pharmacological measures as appropriate and evaluate response  - Consider cultural and social influences on pain and pain management  - Notify physician/advanced practitioner if interventions unsuccessful or patient reports new pain  Outcome: Progressing     Problem: INFECTION - ADULT  Goal: Absence or prevention of progression during hospitalization  Description: INTERVENTIONS:  - Assess and monitor for signs and symptoms of infection  - Monitor lab/diagnostic results  - Monitor all insertion sites, i.e. indwelling lines, tubes, and drains  - Monitor endotracheal if appropriate and nasal secretions for changes in amount and color  - Notus appropriate cooling/warming therapies per order  - Administer medications as ordered  - Instruct and encourage patient and family to use good hand hygiene technique  - Identify and instruct in appropriate isolation precautions for identified infection/condition  Outcome: Progressing     Problem: DISCHARGE PLANNING  Goal: Discharge to home or other facility with appropriate resources  Description: INTERVENTIONS:  - Identify barriers to discharge w/patient and caregiver  - Arrange for needed discharge resources and transportation as appropriate  - Identify discharge learning needs (meds, wound care, etc.)  - Arrange for interpretive services to assist at discharge as needed  - Refer to Case Management Department for coordinating discharge planning if the patient needs post-hospital services based on physician/advanced practitioner order or complex needs related to functional status, cognitive ability, or social support system  Outcome: Progressing      Problem: Knowledge Deficit  Goal: Patient/family/caregiver demonstrates understanding of disease process, treatment plan, medications, and discharge instructions  Description: Complete learning assessment and assess knowledge base.  Interventions:  - Provide teaching at level of understanding  - Provide teaching via preferred learning methods  Outcome: Progressing     Problem: CARDIOVASCULAR - ADULT  Goal: Maintains optimal cardiac output and hemodynamic stability  Description: INTERVENTIONS:  - Monitor I/O, vital signs and rhythm  - Monitor for S/S and trends of decreased cardiac output  - Administer and titrate ordered vasoactive medications to optimize hemodynamic stability  - Assess quality of pulses, skin color and temperature  - Assess for signs of decreased coronary artery perfusion  - Instruct patient to report change in severity of symptoms  Outcome: Progressing  Goal: Absence of cardiac dysrhythmias or at baseline rhythm  Description: INTERVENTIONS:  - Continuous cardiac monitoring, vital signs, obtain 12 lead EKG if ordered  - Administer antiarrhythmic and heart rate control medications as ordered  - Monitor electrolytes and administer replacement therapy as ordered  Outcome: Progressing     Problem: RESPIRATORY - ADULT  Goal: Achieves optimal ventilation and oxygenation  Description: INTERVENTIONS:  - Assess for changes in respiratory status  - Assess for changes in mentation and behavior  - Position to facilitate oxygenation and minimize respiratory effort  - Oxygen administered by appropriate delivery if ordered  - Initiate smoking cessation education as indicated  - Encourage broncho-pulmonary hygiene including cough, deep breathe, Incentive Spirometry  - Assess the need for suctioning and aspirate as needed  - Assess and instruct to report SOB or any respiratory difficulty  - Respiratory Therapy support as indicated  Outcome: Progressing

## 2024-03-31 NOTE — DISCHARGE INSTR - AVS FIRST PAGE
- Take new dose of medication Lasix 120mg daily in addition to new medication Spironolactone 25mg daily  - Follow up blood work in 1 week, this should be followed by your Primary care provider  - Stick to a low sodium diet less than 2g daily and fluid restriction 1800ml/day  - Follow up with your outpatient primary care provider, as well as your prior outpatient cardiologist and pulmonologist

## 2024-03-31 NOTE — ASSESSMENT & PLAN NOTE
Wt Readings from Last 3 Encounters:   03/31/24 121 kg (267 lb 9.6 oz)   07/05/23 133 kg (292 lb 15.9 oz)   01/05/23 131 kg (289 lb 0.4 oz)     Worsening lower extremity edema, takes home diuretics, there is a question of fluid/dietary compliance  Was started on IV twice daily diuretics, not significant response and was placed subsequently on a Lasix drip  Echocardiogram showed ejection fraction 75% with diastolic disease  Monitor I's and O's and daily weights -- UOP trended down with ongoing diuresis, weight trended down  Discussed with cardiology, their recommendations were for transition to oral diuretics with 100 mg Lasix and spironolactone 25 mg on 3/30.    Discussed these recommendations with the patient he was hesitant, continue lasix gtt through 3/31  Did note low potassium, was repleted and improved, continue potassium supplement at home, as daily given intiation of spironolactone  Transition to Lasix 120mg daily and Spironolactone 25mg daily  Bmp with PCP 1 week    Patient continues to have edema, he does not frequently elevate his legs, does wear compression.  Will add ACE wraps to help with compression he can continue as home if needed  Encourage leg elevation    Will need to follow up with his Outpatient Cardiologist

## 2024-03-31 NOTE — PROGRESS NOTES
[facility]  Progress Note  Name: Tommie Ramirez I  MRN: [id number]  Unit/Bed#: -[facility] Date of Admission: 3/26/2024   Date of Service: 3/31/2024 I Hospital Day: 5    Assessment/Plan   * Acute on chronic diastolic (congestive) heart failure (HCC)  Assessment & Plan  Wt Readings from Last 3 Encounters:   03/31/24 121 kg (267 lb 9.6 oz)   07/05/23 133 kg (292 lb 15.9 oz)   01/05/23 131 kg (289 lb 0.4 oz)     Worsening lower extremity edema, takes home diuretics, there is a question of fluid/dietary compliance  Was started on IV twice daily diuretics, not significant response and has been subsequently started on a Lasix drip  Echocardiogram showed ejection fraction 75% with diastolic disease  Trend electrolytes and replete as needed  Monitor I's and O's and daily weights --UOP trending down, weight trending down  Discussed with cardiology, their recommendations were for transition to oral diuretics with 100 mg Lasix and spironolactone 25 mg on 3/30.    Discussed these recommendations with the patient he was hesitant, continue lasix gtt through 3/31  Stop lasix gtt  Transition to Lasix 120mg daily and Spironolactone 25mg daily    Patient continues to have edema, he does not frequently elevate his legs, does wear compression.  Will add ACE wraps to help with compression  Encourage leg elevation          Cellulitis of left lower extremity  Assessment & Plan  Reports he was recently started on antibiotics as an outpatient  Seems improved  Was initially on Ancef, switch to Keflex to complete total x10 day course (end 4/5)    Chronic respiratory failure with hypoxia (HCC)  Assessment & Plan  On admission states that he uses 4 L of oxygen at baseline 24/7 secondary to his CHF and COPD  Patient now stating that he was previously on 2.5 to 3 L roughly a month ago, and only more recently has been using 4 L of oxygen, however on chart review noted in multiple locations stating he is on  baseline 3 to 4 L of oxygen, which matches his oxygen prescription  Currently at 4 L    Tobacco abuse  Assessment & Plan  Smoking cessation and placed on nicotine replacement therapy is currently smoking around 1 pack/day and trying to gradually cut back    Essential hypertension  Assessment & Plan  Blood Pressures currently well-controlled hold Benicar for now as he is getting active diuresis    COPD, severe (HCC)  Assessment & Plan  Not in exacerbation continue home meds               VTE Pharmacologic Prophylaxis:   Moderate Risk (Score 3-4) - Pharmacological DVT Prophylaxis Ordered: enoxaparin (Lovenox).    Mobility:   Basic Mobility Inpatient Raw Score: 18  JH-HLM Goal: 6: Walk 10 steps or more  JH-HLM Achieved: 6: Walk 10 steps or more  JH-HLM Goal achieved. Continue to encourage appropriate mobility.    Patient Centered Rounds: I performed bedside rounds with nursing staff today.   Discussions with Specialists or Other Care Team Provider: none    Education and Discussions with Family / Patient: Updated  (significant other) at bedside.    Total Time Spent on Date of Encounter in care of patient: 38 mins. This time was spent on one or more of the following: performing physical exam; counseling and coordination of care; obtaining or reviewing history; documenting in the medical record; reviewing/ordering tests, medications or procedures; communicating with other healthcare professionals and discussing with patient's family/caregivers.    Current Length of Stay: 5 day(s)  Current Patient Status: Inpatient   Certification Statement: The patient will continue to require additional inpatient hospital stay due to chf  Discharge Plan: Anticipate discharge tomorrow to home.    Code Status: Level 1 - Full Code    Subjective:   Patient feels LE edema remains significant above baseline but improving, breathing feels closer to baseline    Objective:     Vitals:   Temp (24hrs), Av.3 °F (36.3 °C), Min:97.3  °F (36.3 °C), Max:97.3 °F (36.3 °C)    Temp:  [97.3 °F (36.3 °C)] 97.3 °F (36.3 °C)  HR:  [81] 81  Resp:  [16] 16  BP: (130)/(76) 130/76  SpO2:  [93 %-95 %] 95 %  Body mass index is 37.32 kg/m².     Input and Output Summary (last 24 hours):     Intake/Output Summary (Last 24 hours) at 3/31/2024 1427  Last data filed at 3/31/2024 1249  Gross per 24 hour   Intake 240 ml   Output 2500 ml   Net -2260 ml       Physical Exam:   Physical Exam  Vitals and nursing note reviewed.   Constitutional:       General: He is not in acute distress.     Appearance: He is well-developed.      Comments: Chronically ill-appearing   HENT:      Head: Normocephalic and atraumatic.   Eyes:      Conjunctiva/sclera: Conjunctivae normal.   Neck:      Comments: Cervical kyphotic appearing position of the neck best forward (history cervical disease)  Cardiovascular:      Rate and Rhythm: Normal rate and regular rhythm.      Heart sounds: No murmur heard.  Pulmonary:      Effort: Pulmonary effort is normal. No respiratory distress.      Comments: Mild Decreased breath sounds throughout, no wheezing or rhonchi  Abdominal:      Palpations: Abdomen is soft.      Tenderness: There is no abdominal tenderness.   Musculoskeletal:         General: No swelling.      Right lower leg: Edema present.      Left lower leg: Edema present.      Comments: 1+ pitting edema bilaterally lower extremities   Skin:     General: Skin is warm and dry.   Neurological:      Mental Status: He is alert.   Psychiatric:         Mood and Affect: Mood normal.          Additional Data:     Labs:  Results from last 7 days   Lab Units 03/27/24  0503 03/26/24  1525   WBC Thousand/uL 10.75* 11.20*   HEMOGLOBIN g/dL 13.5 14.0   HEMATOCRIT % 42.6 44.6   PLATELETS Thousands/uL 268 284   NEUTROS PCT %  --  63   LYMPHS PCT %  --  16   MONOS PCT %  --  14*   EOS PCT %  --  6     Results from last 7 days   Lab Units 03/31/24  0551 03/27/24  0503 03/26/24  1525   SODIUM mmol/L 141   < > 140    POTASSIUM mmol/L 2.9*   < > 3.6   CHLORIDE mmol/L 90*   < > 90*   CO2 mmol/L 44*   < > 43*   BUN mg/dL 15   < > 13   CREATININE mg/dL 0.62   < > 0.71   ANION GAP mmol/L 7   < > 7   CALCIUM mg/dL 9.1   < > 9.8   ALBUMIN g/dL  --   --  3.9   TOTAL BILIRUBIN mg/dL  --   --  0.44   ALK PHOS U/L  --   --  64   ALT U/L  --   --  29   AST U/L  --   --  36   GLUCOSE RANDOM mg/dL 115   < > 87    < > = values in this interval not displayed.                 Results from last 7 days   Lab Units 03/27/24  0503   PROCALCITONIN ng/ml 0.07       Lines/Drains:  Invasive Devices       Peripheral Intravenous Line  Duration             Peripheral IV 03/26/24 Dorsal (posterior);Right Hand 4 days    Peripheral IV 03/26/24 Right Antecubital 4 days                          Imaging: No pertinent imaging reviewed.    Recent Cultures (last 7 days):         Last 24 Hours Medication List:   Current Facility-Administered Medications   Medication Dose Route Frequency Provider Last Rate    acetaminophen  650 mg Oral Q6H PRN Charisma Torres MD      albuterol  2 puff Inhalation 4x Daily Charisma Torres MD      albuterol  2.5 mg Nebulization Q6H PRN Charisma Torres MD      aspirin  81 mg Oral BID Charisma Torres MD      bismuth subsalicylate  524 mg Oral Q6H PRN Rigo Mota MD      cephalexin  500 mg Oral Q6H CHICHO Nacho Reilly Counts, DO      cyanocobalamin  500 mcg Oral Daily Charisma Torres MD      enoxaparin  40 mg Subcutaneous Daily Charisma Torers MD      fluticasone  1 puff Inhalation Daily Charisma Torres MD      furosemide  120 mg Oral Daily Nacho Hook, DO      guaiFENesin  600 mg Oral Q12H CHICHO Charisma Torres MD      magnesium Oxide  400 mg Oral Daily AMALIA Burgos      melatonin  3 mg Oral HS Carroll Beckford, DO      mometasone  2 spray Nasal BID Nacho Reilly Counts, DO      multivitamin-minerals  1 tablet Oral Daily Charisma Torres MD      nicotine  21 mg Transdermal Daily Charisma Torres MD      sodium chloride  1 spray Each Nare Q1H PRN Charisma Torres MD       spironolactone  25 mg Oral Daily Nacho Hook DO          Today, Patient Was Seen By: Nacho Hook DO    **Please Note: This note may have been constructed using a voice recognition system.**

## 2024-03-31 NOTE — ASSESSMENT & PLAN NOTE
Wt Readings from Last 3 Encounters:   03/31/24 121 kg (267 lb 9.6 oz)   07/05/23 133 kg (292 lb 15.9 oz)   01/05/23 131 kg (289 lb 0.4 oz)     Worsening lower extremity edema, takes home diuretics, there is a question of fluid/dietary compliance  Was started on IV twice daily diuretics, not significant response and has been subsequently started on a Lasix drip  Echocardiogram showed ejection fraction 75% with diastolic disease  Trend electrolytes and replete as needed  Monitor I's and O's and daily weights --UOP trending down, weight trending down  Discussed with cardiology, their recommendations were for transition to oral diuretics with 100 mg Lasix and spironolactone 25 mg on 3/30.    Discussed these recommendations with the patient he was hesitant, continue lasix gtt through 3/31  Stop lasix gtt  Transition to Lasix 120mg daily and Spironolactone 25mg daily    Patient continues to have edema, he does not frequently elevate his legs, does wear compression.  Will add ACE wraps to help with compression  Encourage leg elevation

## 2024-03-31 NOTE — DISCHARGE SUMMARY
[facility]  Discharge- Tommie Ramirez 1955, 68 y.o. male MRN: [id number]  Unit/Bed#: -[place] Encounter: [id number]  Primary Care Provider: Herb Campbell DO   Date and time admitted to hospital: 3/26/2024  2:58 PM    * Acute on chronic diastolic (congestive) heart failure (HCC)  Assessment & Plan  Wt Readings from Last 3 Encounters:   03/31/24 121 kg (267 lb 9.6 oz)   07/05/23 133 kg (292 lb 15.9 oz)   01/05/23 131 kg (289 lb 0.4 oz)     Worsening lower extremity edema, takes home diuretics, there is a question of fluid/dietary compliance  Was started on IV twice daily diuretics, not significant response and was placed subsequently on a Lasix drip  Echocardiogram showed ejection fraction 75% with diastolic disease  Monitor I's and O's and daily weights -- UOP trended down with ongoing diuresis, weight trended down  Discussed with cardiology, their recommendations were for transition to oral diuretics with 100 mg Lasix and spironolactone 25 mg on 3/30.    Discussed these recommendations with the patient he was hesitant, continue lasix gtt through 3/31  Did note low potassium, was repleted and improved, continue potassium supplement at home, as daily given intiation of spironolactone  Transition to Lasix 120mg daily and Spironolactone 25mg daily  Bmp with PCP 1 week    Patient continues to have edema, he does not frequently elevate his legs, does wear compression.  Will add ACE wraps to help with compression he can continue as home if needed  Encourage leg elevation    Will need to follow up with his Outpatient Cardiologist          Cellulitis of left lower extremity  Assessment & Plan  Reports he was recently started on antibiotics as an outpatient  Seems improved  Was initially on Ancef, switch to Keflex to complete total x10 day course (end 4/5)    Chronic respiratory failure with hypoxia (HCC)  Assessment & Plan  On admission states that he uses 4 L of oxygen at baseline  24/7 secondary to his CHF and COPD  Patient now stating that he was previously on 2.5 to 3 L roughly a month ago, and only more recently has been using 4 L of oxygen, however on chart review noted in multiple locations stating he is on baseline 3 to 4 L of oxygen, which matches his oxygen prescription  Currently at 4 L    Tobacco abuse  Assessment & Plan  Smoking cessation and placed on nicotine replacement therapy is currently smoking around 1 pack/day and trying to gradually cut back    Essential hypertension  Assessment & Plan  Blood Pressures currently well  Okay to resume Benicar upon discharge    COPD, severe (HCC)  Assessment & Plan  Not in exacerbation continue home meds  Recommend following with his outpatient Pulmonologist        Medical Problems       Resolved Problems  Date Reviewed: 3/31/2024   None       Discharging Physician / Practitioner: Nacho Hook DO  PCP: Herb Campbell DO  Admission Date:   Admission Orders (From admission, onward)       Ordered        03/26/24 1616  INPATIENT ADMISSION  Once                          Discharge Date: 03/31/24    Consultations During Hospital Stay:  Cardiology    Procedures Performed:   none    Significant Findings / Test Results:   XR chest 1 view portable   ED Interpretation by Castillo Orr DO (03/26 1552)   Atelectatic changes, blunting of the left costophrenic angle. No focal infiltrates.       Final Result by Fredi Reardon MD (03/27 0906)      Mildly limited exam. Minimal left basilar pleural effusion and basilar atelectasis.            Workstation performed: [id number]          VAS VENOUS DUPLEX - LOWER LIMB BILATERAL    (Results Pending)     Lab Results   Component Value Date    WBC 10.75 (H) 03/27/2024    HGB 13.5 03/27/2024    HCT 42.6 03/27/2024    MCV 95 03/27/2024     03/27/2024     Lab Results   Component Value Date    SODIUM 141 03/31/2024    K 3.7 03/31/2024    CL 90 (L) 03/31/2024    CO2 44 (H) 03/31/2024    BUN 15  03/31/2024    CREATININE 0.62 03/31/2024    GLUC 115 03/31/2024    CALCIUM 9.1 03/31/2024         Incidental Findings:   See above   I reviewed the above mentioned incidental findings with the patient and/or family and they expressed understanding.    Test Results Pending at Discharge (will require follow up):   none     Outpatient Tests Requested:  BMP 1 week with PCP    Complications:  none    Reason for Admission: SoB/leg edema    Hospital Course:   Tommie Ramirez is a 68 y.o. male patient who originally presented to the hospital on 3/26/2024 due to congestive heart failure.  Patient was initially started on increased IV diuresis, and did not show significant output and was ultimately placed on a Lasix drip.  He did show good urine output, and downtrending of his weight.  Uncertain of his baseline, he reported some weight loss previously, his weight trended below his previous baseline found in chart.  He did require up to 5 L nasal cannula which improved with diuresis and remained stable on his baseline of 3 to 4 L of oxygen.  He was seen by cardiology.  Recommended increasing his home diuretics.  He continued to have lower extremity edema, given his underlying comorbidities, likely to have ongoing edema and recommended continued compression as well as keeping legs elevated as able.  Patient was medically stable at his baseline at the time of discharge.  Recommended to follow-up with his primary care provider, cardiologist, and pulmonologist as an outpatient.        Please see above list of diagnoses and related plan for additional information.     Condition at Discharge: fair    Discharge Day Visit / Exam:   Subjective: Patient has concerns about his lower extremity edema, discussed that they have clinically improved, and will fluid further improve with further diuretics, as he has shown improvement with diuresis that he has been here.  With ongoing discussion, patient felt comfortable going home.  Vitals:  "Blood Pressure: 130/76 (03/30/24 2157)  Pulse: 81 (03/30/24 2157)  Temperature: (!) 97.3 °F (36.3 °C) (03/30/24 2157)  Temp Source: Temporal (03/29/24 1539)  Respirations: 16 (03/30/24 2157)  Height: 5' 11\" (180.3 cm) (03/28/24 0802)  Weight - Scale: 121 kg (267 lb 9.6 oz) (03/31/24 0600)  SpO2: 95 % (03/30/24 2157)  Exam:   Physical Exam  Vitals and nursing note reviewed.   Constitutional:       General: He is not in acute distress.     Appearance: He is well-developed.      Comments: Chronically ill-appearing   HENT:      Head: Normocephalic and atraumatic.   Eyes:      Conjunctiva/sclera: Conjunctivae normal.   Neck:      Comments: Cervical kyphotic appearing position of the neck best forward (history cervical disease)  Cardiovascular:      Rate and Rhythm: Normal rate and regular rhythm.      Heart sounds: No murmur heard.  Pulmonary:      Effort: Pulmonary effort is normal. No respiratory distress.      Comments: Mild Decreased breath sounds throughout, no wheezing or rhonchi  Abdominal:      Palpations: Abdomen is soft.      Tenderness: There is no abdominal tenderness.   Musculoskeletal:         General: No swelling.      Right lower leg: Edema present.      Left lower leg: Edema present.      Comments: 1+ pitting edema bilaterally lower extremities   Skin:     General: Skin is warm and dry.   Neurological:      Mental Status: He is alert.   Psychiatric:         Mood and Affect: Mood normal.          Discussion with Family: Updated  (wife) at bedside.    Discharge instructions/Information to patient and family:   See after visit summary for information provided to patient and family.      Provisions for Follow-Up Care:  See after visit summary for information related to follow-up care and any pertinent home health orders.      Mobility at time of Discharge:   Basic Mobility Inpatient Raw Score: 18  JH-HLM Goal: 6: Walk 10 steps or more  JH-HLM Achieved: 6: Walk 10 steps or more  HLM Goal " achieved. Continue to encourage appropriate mobility.     Disposition:   Home  Of note, patient was recommended for rehab, however refused inpatient rehab, home health care, or outpatient    Planned Readmission: no     Discharge Statement:  I spent 60 minutes discharging the patient. This time was spent on the day of discharge. I had direct contact with the patient on the day of discharge. Greater than 50% of the total time was spent examining patient, answering all patient questions, arranging and discussing plan of care with patient as well as directly providing post-discharge instructions.  Additional time then spent on discharge activities.    Discharge Medications:  See after visit summary for reconciled discharge medications provided to patient and/or family.      **Please Note: This note may have been constructed using a voice recognition system**

## 2024-03-31 NOTE — ASSESSMENT & PLAN NOTE
On admission states that he uses 4 L of oxygen at baseline 24/7 secondary to his CHF and COPD  Patient now stating that he was previously on 2.5 to 3 L roughly a month ago, and only more recently has been using 4 L of oxygen, however on chart review noted in multiple locations stating he is on baseline 3 to 4 L of oxygen, which matches his oxygen prescription  Currently at 4 L

## 2024-03-31 NOTE — ASSESSMENT & PLAN NOTE
Reports he was recently started on antibiotics as an outpatient  Seems improved  Was initially on Ancef, switch to Keflex to complete total x10 day course (end 4/5)

## 2024-04-01 PROCEDURE — 93970 EXTREMITY STUDY: CPT | Performed by: SURGERY

## 2024-05-18 ENCOUNTER — APPOINTMENT (EMERGENCY)
Dept: RADIOLOGY | Facility: HOSPITAL | Age: 69
End: 2024-05-18
Payer: MEDICARE

## 2024-05-18 ENCOUNTER — HOSPITAL ENCOUNTER (EMERGENCY)
Facility: HOSPITAL | Age: 69
Discharge: HOME | End: 2024-05-18
Attending: EMERGENCY MEDICINE
Payer: MEDICARE

## 2024-05-18 VITALS
WEIGHT: 261 LBS | OXYGEN SATURATION: 94 % | HEIGHT: 71 IN | TEMPERATURE: 98.8 F | RESPIRATION RATE: 20 BRPM | HEART RATE: 92 BPM | BODY MASS INDEX: 36.54 KG/M2 | DIASTOLIC BLOOD PRESSURE: 74 MMHG | SYSTOLIC BLOOD PRESSURE: 145 MMHG

## 2024-05-18 DIAGNOSIS — T20.00XA: ICD-10-CM

## 2024-05-18 DIAGNOSIS — T20.20XA PARTIAL THICKNESS BURN OF FACE, INITIAL ENCOUNTER: Primary | ICD-10-CM

## 2024-05-18 LAB
ALBUMIN SERPL BCP-MCNC: 4 G/DL (ref 3.5–5)
ALP SERPL-CCNC: 63 U/L (ref 34–104)
ALT SERPL W P-5'-P-CCNC: 27 U/L (ref 7–52)
ANION GAP SERPL CALCULATED.3IONS-SCNC: 7 MMOL/L (ref 4–13)
APTT PPP: 29 SECONDS (ref 23–37)
AST SERPL W P-5'-P-CCNC: 33 U/L (ref 13–39)
BASE EX.OXY STD BLDV CALC-SCNC: 50.2 % (ref 60–80)
BASE EXCESS BLDV CALC-SCNC: 8.8 MMOL/L
BASOPHILS # BLD AUTO: 0.05 THOUSANDS/ÂΜL (ref 0–0.1)
BASOPHILS NFR BLD AUTO: 0 % (ref 0–1)
BILIRUB SERPL-MCNC: 0.65 MG/DL (ref 0.2–1)
BUN SERPL-MCNC: 16 MG/DL (ref 5–25)
CALCIUM SERPL-MCNC: 9.7 MG/DL (ref 8.4–10.2)
CHLORIDE SERPL-SCNC: 90 MMOL/L (ref 96–108)
CO2 SERPL-SCNC: 39 MMOL/L (ref 21–32)
CREAT SERPL-MCNC: 0.66 MG/DL (ref 0.6–1.3)
EOSINOPHIL # BLD AUTO: 0.21 THOUSAND/ÂΜL (ref 0–0.61)
EOSINOPHIL NFR BLD AUTO: 2 % (ref 0–6)
ERYTHROCYTE [DISTWIDTH] IN BLOOD BY AUTOMATED COUNT: 14 % (ref 11.6–15.1)
GFR SERPL CREATININE-BSD FRML MDRD: 98 ML/MIN/1.73SQ M
GLUCOSE SERPL-MCNC: 113 MG/DL (ref 65–140)
HCO3 BLDV-SCNC: 36.2 MMOL/L (ref 24–30)
HCT VFR BLD AUTO: 43.4 % (ref 36.5–49.3)
HGB BLD-MCNC: 13.9 G/DL (ref 12–17)
IMM GRANULOCYTES # BLD AUTO: 0.06 THOUSAND/UL (ref 0–0.2)
IMM GRANULOCYTES NFR BLD AUTO: 1 % (ref 0–2)
INR PPP: 0.99 (ref 0.84–1.19)
LYMPHOCYTES # BLD AUTO: 1.29 THOUSANDS/ÂΜL (ref 0.6–4.47)
LYMPHOCYTES NFR BLD AUTO: 10 % (ref 14–44)
MCH RBC QN AUTO: 29.7 PG (ref 26.8–34.3)
MCHC RBC AUTO-ENTMCNC: 32 G/DL (ref 31.4–37.4)
MCV RBC AUTO: 93 FL (ref 82–98)
MONOCYTES # BLD AUTO: 1.61 THOUSAND/ÂΜL (ref 0.17–1.22)
MONOCYTES NFR BLD AUTO: 13 % (ref 4–12)
NEUTROPHILS # BLD AUTO: 9.45 THOUSANDS/ÂΜL (ref 1.85–7.62)
NEUTS SEG NFR BLD AUTO: 74 % (ref 43–75)
NRBC BLD AUTO-RTO: 0 /100 WBCS
O2 CT BLDV-SCNC: 10.6 ML/DL
PCO2 BLDV: 61 MM HG (ref 42–50)
PH BLDV: 7.39 [PH] (ref 7.3–7.4)
PLATELET # BLD AUTO: 258 THOUSANDS/UL (ref 149–390)
PMV BLD AUTO: 9.2 FL (ref 8.9–12.7)
PO2 BLDV: 26.8 MM HG (ref 35–45)
POTASSIUM SERPL-SCNC: 3.8 MMOL/L (ref 3.5–5.3)
PROT SERPL-MCNC: 7.3 G/DL (ref 6.4–8.4)
PROTHROMBIN TIME: 13.4 SECONDS (ref 11.6–14.5)
RBC # BLD AUTO: 4.68 MILLION/UL (ref 3.88–5.62)
SODIUM SERPL-SCNC: 136 MMOL/L (ref 135–147)
WBC # BLD AUTO: 12.67 THOUSAND/UL (ref 4.31–10.16)

## 2024-05-18 PROCEDURE — 71045 X-RAY EXAM CHEST 1 VIEW: CPT

## 2024-05-18 PROCEDURE — 85610 PROTHROMBIN TIME: CPT | Performed by: PHYSICIAN ASSISTANT

## 2024-05-18 PROCEDURE — 99285 EMERGENCY DEPT VISIT HI MDM: CPT

## 2024-05-18 PROCEDURE — 85730 THROMBOPLASTIN TIME PARTIAL: CPT | Performed by: PHYSICIAN ASSISTANT

## 2024-05-18 PROCEDURE — 36415 COLL VENOUS BLD VENIPUNCTURE: CPT | Performed by: PHYSICIAN ASSISTANT

## 2024-05-18 PROCEDURE — 82805 BLOOD GASES W/O2 SATURATION: CPT | Performed by: PHYSICIAN ASSISTANT

## 2024-05-18 PROCEDURE — 99285 EMERGENCY DEPT VISIT HI MDM: CPT | Performed by: PHYSICIAN ASSISTANT

## 2024-05-18 PROCEDURE — 80053 COMPREHEN METABOLIC PANEL: CPT | Performed by: PHYSICIAN ASSISTANT

## 2024-05-18 PROCEDURE — 85025 COMPLETE CBC W/AUTO DIFF WBC: CPT | Performed by: PHYSICIAN ASSISTANT

## 2024-05-18 RX ORDER — LEVOTHYROXINE SODIUM 0.03 MG/1
25 TABLET ORAL DAILY
COMMUNITY

## 2024-05-18 RX ORDER — ECHINACEA PURPUREA EXTRACT 125 MG
1 TABLET ORAL ONCE
Status: COMPLETED | OUTPATIENT
Start: 2024-05-18 | End: 2024-05-18

## 2024-05-18 RX ORDER — OXYMETAZOLINE HYDROCHLORIDE 0.05 G/100ML
2 SPRAY NASAL 2 TIMES DAILY
Qty: 1.2 ML | Refills: 0 | Status: SHIPPED | OUTPATIENT
Start: 2024-05-18 | End: 2024-05-21

## 2024-05-18 RX ORDER — DOXYCYCLINE HYCLATE 100 MG/1
100 CAPSULE ORAL 2 TIMES DAILY
Qty: 14 CAPSULE | Refills: 0 | Status: SHIPPED | OUTPATIENT
Start: 2024-05-18 | End: 2024-05-25

## 2024-05-18 RX ORDER — BACITRACIN, NEOMYCIN, POLYMYXIN B 400; 3.5; 5 [USP'U]/G; MG/G; [USP'U]/G
1 OINTMENT TOPICAL ONCE
Status: COMPLETED | OUTPATIENT
Start: 2024-05-18 | End: 2024-05-18

## 2024-05-18 RX ADMIN — SALINE NASAL SPRAY 1 SPRAY: 1.5 SOLUTION NASAL at 14:06

## 2024-05-18 RX ADMIN — BACITRACIN ZINC, NEOMYCIN, POLYMYXIN B 1 LARGE APPLICATION: 400; 3.5; 5 OINTMENT TOPICAL at 14:06
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
## 2024-09-24 ENCOUNTER — HOSPITAL ENCOUNTER (OUTPATIENT)
Dept: CT IMAGING | Facility: HOSPITAL | Age: 69
Discharge: HOME | End: 2024-09-24

## 2024-09-24 DIAGNOSIS — J44.9 CHRONIC OBSTRUCTIVE PULMONARY DISEASE, UNSPECIFIED (HCC): ICD-10-CM

## 2025-03-16 ENCOUNTER — HOSPITAL ENCOUNTER (INPATIENT)
Facility: HOSPITAL | Age: 70
LOS: 2 days | Discharge: LEFT BEFORE DISCHARGE | DRG: 291 | End: 2025-03-18
Attending: EMERGENCY MEDICINE | Admitting: INTERNAL MEDICINE
Payer: MEDICARE

## 2025-03-16 ENCOUNTER — APPOINTMENT (EMERGENCY)
Dept: CT IMAGING | Facility: HOSPITAL | Age: 70
DRG: 291 | End: 2025-03-16
Payer: MEDICARE

## 2025-03-16 ENCOUNTER — APPOINTMENT (EMERGENCY)
Dept: RADIOLOGY | Facility: HOSPITAL | Age: 70
DRG: 291 | End: 2025-03-16
Payer: MEDICARE

## 2025-03-16 DIAGNOSIS — I50.9 ACUTE ON CHRONIC CONGESTIVE HEART FAILURE, UNSPECIFIED HEART FAILURE TYPE (HCC): Primary | ICD-10-CM

## 2025-03-16 DIAGNOSIS — I10 ESSENTIAL HYPERTENSION: ICD-10-CM

## 2025-03-16 DIAGNOSIS — J44.9 COPD, SEVERE (HCC): ICD-10-CM

## 2025-03-16 DIAGNOSIS — R29.6 FREQUENT FALLS: ICD-10-CM

## 2025-03-16 DIAGNOSIS — M54.2 CERVICALGIA: ICD-10-CM

## 2025-03-16 DIAGNOSIS — E43 SEVERE PROTEIN-CALORIE MALNUTRITION (HCC): ICD-10-CM

## 2025-03-16 DIAGNOSIS — I50.33 ACUTE ON CHRONIC DIASTOLIC (CONGESTIVE) HEART FAILURE (HCC): ICD-10-CM

## 2025-03-16 DIAGNOSIS — R53.1 GENERAL WEAKNESS: ICD-10-CM

## 2025-03-16 DIAGNOSIS — R26.2 AMBULATORY DYSFUNCTION: ICD-10-CM

## 2025-03-16 DIAGNOSIS — Z71.89 GOALS OF CARE, COUNSELING/DISCUSSION: ICD-10-CM

## 2025-03-16 DIAGNOSIS — J96.11 CHRONIC RESPIRATORY FAILURE WITH HYPOXIA (HCC): ICD-10-CM

## 2025-03-16 PROBLEM — E87.1 HYPONATREMIA: Status: ACTIVE | Noted: 2025-03-16

## 2025-03-16 PROBLEM — R13.10 DYSPHAGIA: Status: ACTIVE | Noted: 2025-03-16

## 2025-03-16 LAB
2HR DELTA HS TROPONIN: 0 NG/L
ALBUMIN SERPL BCG-MCNC: 3.3 G/DL (ref 3.5–5)
ALP SERPL-CCNC: 67 U/L (ref 34–104)
ALT SERPL W P-5'-P-CCNC: 16 U/L (ref 7–52)
ANION GAP SERPL CALCULATED.3IONS-SCNC: 11 MMOL/L (ref 4–13)
ANION GAP SERPL CALCULATED.3IONS-SCNC: 12 MMOL/L (ref 4–13)
AST SERPL W P-5'-P-CCNC: 28 U/L (ref 13–39)
BASE EX.OXY STD BLDV CALC-SCNC: 61.4 % (ref 60–80)
BASE EXCESS BLDV CALC-SCNC: 6.3 MMOL/L
BASOPHILS # BLD AUTO: 0.02 THOUSANDS/ÂΜL (ref 0–0.1)
BASOPHILS NFR BLD AUTO: 0 % (ref 0–1)
BILIRUB SERPL-MCNC: 0.68 MG/DL (ref 0.2–1)
BILIRUB UR QL STRIP: NEGATIVE
BNP SERPL-MCNC: 26 PG/ML (ref 0–100)
BUN SERPL-MCNC: 10 MG/DL (ref 5–25)
BUN SERPL-MCNC: 10 MG/DL (ref 5–25)
CALCIUM ALBUM COR SERPL-MCNC: 11.9 MG/DL (ref 8.3–10.1)
CALCIUM SERPL-MCNC: 10.9 MG/DL (ref 8.4–10.2)
CALCIUM SERPL-MCNC: 11.3 MG/DL (ref 8.4–10.2)
CARDIAC TROPONIN I PNL SERPL HS: 15 NG/L (ref ?–50)
CARDIAC TROPONIN I PNL SERPL HS: 15 NG/L (ref ?–50)
CHLORIDE SERPL-SCNC: 78 MMOL/L (ref 96–108)
CHLORIDE SERPL-SCNC: 79 MMOL/L (ref 96–108)
CLARITY UR: CLEAR
CO2 SERPL-SCNC: 35 MMOL/L (ref 21–32)
CO2 SERPL-SCNC: 40 MMOL/L (ref 21–32)
COLOR UR: YELLOW
CREAT SERPL-MCNC: 0.58 MG/DL (ref 0.6–1.3)
CREAT SERPL-MCNC: 0.59 MG/DL (ref 0.6–1.3)
EOSINOPHIL # BLD AUTO: 0.03 THOUSAND/ÂΜL (ref 0–0.61)
EOSINOPHIL NFR BLD AUTO: 0 % (ref 0–6)
ERYTHROCYTE [DISTWIDTH] IN BLOOD BY AUTOMATED COUNT: 13.2 % (ref 11.6–15.1)
EST. AVERAGE GLUCOSE BLD GHB EST-MCNC: 103 MG/DL
FLUAV RNA RESP QL NAA+PROBE: NEGATIVE
FLUBV RNA RESP QL NAA+PROBE: NEGATIVE
GFR SERPL CREATININE-BSD FRML MDRD: 102 ML/MIN/1.73SQ M
GFR SERPL CREATININE-BSD FRML MDRD: 103 ML/MIN/1.73SQ M
GLUCOSE SERPL-MCNC: 96 MG/DL (ref 65–140)
GLUCOSE SERPL-MCNC: 98 MG/DL (ref 65–140)
GLUCOSE UR STRIP-MCNC: NEGATIVE MG/DL
HBA1C MFR BLD: 5.2 %
HCO3 BLDV-SCNC: 34.1 MMOL/L (ref 24–30)
HCT VFR BLD AUTO: 43.8 % (ref 36.5–49.3)
HGB BLD-MCNC: 14.2 G/DL (ref 12–17)
HGB UR QL STRIP.AUTO: NEGATIVE
IMM GRANULOCYTES # BLD AUTO: 0.05 THOUSAND/UL (ref 0–0.2)
IMM GRANULOCYTES NFR BLD AUTO: 0 % (ref 0–2)
KETONES UR STRIP-MCNC: ABNORMAL MG/DL
LACTATE SERPL-SCNC: 1.5 MMOL/L (ref 0.5–2)
LEUKOCYTE ESTERASE UR QL STRIP: NEGATIVE
LIPASE SERPL-CCNC: 9 U/L (ref 11–82)
LYMPHOCYTES # BLD AUTO: 0.77 THOUSANDS/ÂΜL (ref 0.6–4.47)
LYMPHOCYTES NFR BLD AUTO: 6 % (ref 14–44)
MAGNESIUM SERPL-MCNC: 1.6 MG/DL (ref 1.9–2.7)
MCH RBC QN AUTO: 29.5 PG (ref 26.8–34.3)
MCHC RBC AUTO-ENTMCNC: 32.4 G/DL (ref 31.4–37.4)
MCV RBC AUTO: 91 FL (ref 82–98)
MONOCYTES # BLD AUTO: 1.82 THOUSAND/ÂΜL (ref 0.17–1.22)
MONOCYTES NFR BLD AUTO: 15 % (ref 4–12)
NEUTROPHILS # BLD AUTO: 9.67 THOUSANDS/ÂΜL (ref 1.85–7.62)
NEUTS SEG NFR BLD AUTO: 79 % (ref 43–75)
NITRITE UR QL STRIP: NEGATIVE
NRBC BLD AUTO-RTO: 0 /100 WBCS
O2 CT BLDV-SCNC: 13 ML/DL
OSMOLALITY UR/SERPL-RTO: 276 MMOL/KG (ref 282–298)
OSMOLALITY UR: 234 MMOL/KG (ref 250–900)
PCO2 BLDV: 62.5 MM HG (ref 42–50)
PH BLDV: 7.36 [PH] (ref 7.3–7.4)
PH UR STRIP.AUTO: 7 [PH]
PLATELET # BLD AUTO: 251 THOUSANDS/UL (ref 149–390)
PMV BLD AUTO: 9 FL (ref 8.9–12.7)
PO2 BLDV: 32.9 MM HG (ref 35–45)
POTASSIUM SERPL-SCNC: 3.6 MMOL/L (ref 3.5–5.3)
POTASSIUM SERPL-SCNC: 3.9 MMOL/L (ref 3.5–5.3)
PROCALCITONIN SERPL-MCNC: 0.06 NG/ML
PROT SERPL-MCNC: 7 G/DL (ref 6.4–8.4)
PROT UR STRIP-MCNC: NEGATIVE MG/DL
RBC # BLD AUTO: 4.82 MILLION/UL (ref 3.88–5.62)
RSV RNA RESP QL NAA+PROBE: NEGATIVE
SARS-COV-2 RNA RESP QL NAA+PROBE: NEGATIVE
SODIUM SERPL-SCNC: 126 MMOL/L (ref 135–147)
SODIUM SERPL-SCNC: 129 MMOL/L (ref 135–147)
SODIUM UR-SCNC: 64 MMOL/L
SP GR UR STRIP.AUTO: 1.01 (ref 1–1.03)
TSH SERPL DL<=0.05 MIU/L-ACNC: 4.32 UIU/ML (ref 0.45–4.5)
UROBILINOGEN UR QL STRIP.AUTO: 0.2 E.U./DL
WBC # BLD AUTO: 12.36 THOUSAND/UL (ref 4.31–10.16)

## 2025-03-16 PROCEDURE — 96374 THER/PROPH/DIAG INJ IV PUSH: CPT

## 2025-03-16 PROCEDURE — 80048 BASIC METABOLIC PNL TOTAL CA: CPT

## 2025-03-16 PROCEDURE — 96375 TX/PRO/DX INJ NEW DRUG ADDON: CPT

## 2025-03-16 PROCEDURE — 99222 1ST HOSP IP/OBS MODERATE 55: CPT | Performed by: INTERNAL MEDICINE

## 2025-03-16 PROCEDURE — 80053 COMPREHEN METABOLIC PANEL: CPT | Performed by: EMERGENCY MEDICINE

## 2025-03-16 PROCEDURE — 83690 ASSAY OF LIPASE: CPT | Performed by: EMERGENCY MEDICINE

## 2025-03-16 PROCEDURE — 83880 ASSAY OF NATRIURETIC PEPTIDE: CPT | Performed by: EMERGENCY MEDICINE

## 2025-03-16 PROCEDURE — 83930 ASSAY OF BLOOD OSMOLALITY: CPT

## 2025-03-16 PROCEDURE — 83605 ASSAY OF LACTIC ACID: CPT | Performed by: EMERGENCY MEDICINE

## 2025-03-16 PROCEDURE — 84145 PROCALCITONIN (PCT): CPT | Performed by: EMERGENCY MEDICINE

## 2025-03-16 PROCEDURE — 87040 BLOOD CULTURE FOR BACTERIA: CPT | Performed by: EMERGENCY MEDICINE

## 2025-03-16 PROCEDURE — 81003 URINALYSIS AUTO W/O SCOPE: CPT | Performed by: EMERGENCY MEDICINE

## 2025-03-16 PROCEDURE — 82805 BLOOD GASES W/O2 SATURATION: CPT | Performed by: EMERGENCY MEDICINE

## 2025-03-16 PROCEDURE — 84300 ASSAY OF URINE SODIUM: CPT

## 2025-03-16 PROCEDURE — 85025 COMPLETE CBC W/AUTO DIFF WBC: CPT | Performed by: EMERGENCY MEDICINE

## 2025-03-16 PROCEDURE — 36415 COLL VENOUS BLD VENIPUNCTURE: CPT | Performed by: EMERGENCY MEDICINE

## 2025-03-16 PROCEDURE — 83735 ASSAY OF MAGNESIUM: CPT

## 2025-03-16 PROCEDURE — 0241U HB NFCT DS VIR RESP RNA 4 TRGT: CPT | Performed by: EMERGENCY MEDICINE

## 2025-03-16 PROCEDURE — 83935 ASSAY OF URINE OSMOLALITY: CPT

## 2025-03-16 PROCEDURE — 84443 ASSAY THYROID STIM HORMONE: CPT

## 2025-03-16 PROCEDURE — 84484 ASSAY OF TROPONIN QUANT: CPT | Performed by: EMERGENCY MEDICINE

## 2025-03-16 PROCEDURE — 83036 HEMOGLOBIN GLYCOSYLATED A1C: CPT

## 2025-03-16 PROCEDURE — 93005 ELECTROCARDIOGRAM TRACING: CPT

## 2025-03-16 PROCEDURE — 71045 X-RAY EXAM CHEST 1 VIEW: CPT

## 2025-03-16 PROCEDURE — 99285 EMERGENCY DEPT VISIT HI MDM: CPT

## 2025-03-16 PROCEDURE — 99285 EMERGENCY DEPT VISIT HI MDM: CPT | Performed by: EMERGENCY MEDICINE

## 2025-03-16 RX ORDER — FENTANYL CITRATE 50 UG/ML
50 INJECTION, SOLUTION INTRAMUSCULAR; INTRAVENOUS ONCE
Refills: 0 | Status: COMPLETED | OUTPATIENT
Start: 2025-03-16 | End: 2025-03-16

## 2025-03-16 RX ORDER — ENOXAPARIN SODIUM 100 MG/ML
40 INJECTION SUBCUTANEOUS DAILY
Status: DISCONTINUED | OUTPATIENT
Start: 2025-03-16 | End: 2025-03-18 | Stop reason: HOSPADM

## 2025-03-16 RX ORDER — OLANZAPINE 10 MG/2ML
5 INJECTION, POWDER, FOR SOLUTION INTRAMUSCULAR ONCE
Status: COMPLETED | OUTPATIENT
Start: 2025-03-16 | End: 2025-03-16

## 2025-03-16 RX ORDER — FUROSEMIDE 10 MG/ML
10 SYRINGE (ML) INJECTION CONTINUOUS
Status: DISCONTINUED | OUTPATIENT
Start: 2025-03-16 | End: 2025-03-18

## 2025-03-16 RX ORDER — NICOTINE 21 MG/24HR
14 PATCH, TRANSDERMAL 24 HOURS TRANSDERMAL DAILY
Status: DISCONTINUED | OUTPATIENT
Start: 2025-03-16 | End: 2025-03-18 | Stop reason: HOSPADM

## 2025-03-16 RX ORDER — FLUTICASONE FUROATE, UMECLIDINIUM BROMIDE AND VILANTEROL TRIFENATATE 100; 62.5; 25 UG/1; UG/1; UG/1
1 POWDER RESPIRATORY (INHALATION) DAILY
COMMUNITY

## 2025-03-16 RX ORDER — FUROSEMIDE 10 MG/ML
40 INJECTION INTRAMUSCULAR; INTRAVENOUS ONCE
Status: DISCONTINUED | OUTPATIENT
Start: 2025-03-16 | End: 2025-03-16

## 2025-03-16 RX ORDER — LEVOTHYROXINE SODIUM 25 UG/1
25 TABLET ORAL
Status: DISCONTINUED | OUTPATIENT
Start: 2025-03-17 | End: 2025-03-18 | Stop reason: HOSPADM

## 2025-03-16 RX ORDER — IBUPROFEN 100 MG/5ML
SUSPENSION ORAL EVERY 6 HOURS PRN
COMMUNITY

## 2025-03-16 RX ORDER — ACETAMINOPHEN 325 MG/1
650 TABLET ORAL EVERY 4 HOURS PRN
Status: DISCONTINUED | OUTPATIENT
Start: 2025-03-16 | End: 2025-03-18 | Stop reason: HOSPADM

## 2025-03-16 RX ORDER — ASPIRIN 81 MG/1
81 TABLET, CHEWABLE ORAL DAILY
Status: DISCONTINUED | OUTPATIENT
Start: 2025-03-17 | End: 2025-03-18 | Stop reason: HOSPADM

## 2025-03-16 RX ORDER — PANTOPRAZOLE SODIUM 40 MG/1
40 TABLET, DELAYED RELEASE ORAL
Status: DISCONTINUED | OUTPATIENT
Start: 2025-03-16 | End: 2025-03-18 | Stop reason: HOSPADM

## 2025-03-16 RX ORDER — LIDOCAINE HYDROCHLORIDE 20 MG/ML
15 SOLUTION OROPHARYNGEAL ONCE
Status: COMPLETED | OUTPATIENT
Start: 2025-03-16 | End: 2025-03-16

## 2025-03-16 RX ORDER — FLUTICASONE FUROATE AND VILANTEROL 100; 25 UG/1; UG/1
1 POWDER RESPIRATORY (INHALATION) DAILY
Status: DISCONTINUED | OUTPATIENT
Start: 2025-03-17 | End: 2025-03-18 | Stop reason: HOSPADM

## 2025-03-16 RX ORDER — FUROSEMIDE 10 MG/ML
20 INJECTION INTRAMUSCULAR; INTRAVENOUS ONCE
Status: COMPLETED | OUTPATIENT
Start: 2025-03-16 | End: 2025-03-16

## 2025-03-16 RX ORDER — ASPIRIN 81 MG/1
81 TABLET, CHEWABLE ORAL 2 TIMES DAILY
Status: DISCONTINUED | OUTPATIENT
Start: 2025-03-16 | End: 2025-03-16

## 2025-03-16 RX ADMIN — FENTANYL CITRATE 50 MCG: 0.05 INJECTION, SOLUTION INTRAMUSCULAR; INTRAVENOUS at 10:08

## 2025-03-16 RX ADMIN — Medication 2 G: at 22:50

## 2025-03-16 RX ADMIN — NICOTINE 14 MG: 14 PATCH, EXTENDED RELEASE TRANSDERMAL at 16:50

## 2025-03-16 RX ADMIN — FUROSEMIDE 20 MG: 10 INJECTION, SOLUTION INTRAVENOUS at 10:48

## 2025-03-16 RX ADMIN — OLANZAPINE 5 MG: 10 INJECTION, POWDER, FOR SOLUTION INTRAMUSCULAR at 20:56

## 2025-03-16 RX ADMIN — FUROSEMIDE 20 MG: 10 INJECTION, SOLUTION INTRAVENOUS at 14:32

## 2025-03-16 RX ADMIN — Medication 2 G: at 16:58

## 2025-03-16 RX ADMIN — LIDOCAINE HYDROCHLORIDE 15 ML: 20 SOLUTION ORAL at 10:41

## 2025-03-16 RX ADMIN — Medication 10 MG/HR: at 16:50

## 2025-03-16 NOTE — ASSESSMENT & PLAN NOTE
Patient states that he was on hospice outpatient, patient stating that he would like treatment at this time and revoking hospice. He would like to be full code and talk with palliative care.   Palliative care consult placed

## 2025-03-16 NOTE — ED NOTES
Patient unable to use urinal bottle.  RN attempted to place male purwick. Patient refusing RN to remove patient's underwear. Patient insisting the purwick be placed through the leg hole in the underwear. RN attempted to explain purpose and procedure of proper placement. Patient refusing RN to touch the patient stating 'give me that'. Patient placed own purwick in place.     Allison A Schoener, RN  03/16/25 [id number]

## 2025-03-16 NOTE — ED NOTES
Patient reports has not been taking baby aspirin x1 month.     Allison A Schoener, RN  03/16/25 0954
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
## 2025-03-16 NOTE — NURSING NOTE
"Patient states that he is unable to take PO medications due to his neck being chronically immobile and in the flexed position. Patient's dinner arrived in puree consistency and patient asked what was on the tray, in which he then stated \"that won't work, it has to be super pureed\". This LPN asked for clarification on what that means and patient stated \"like boost drinks, but we grind them up with a hand  at home\". Patient's wife present for conversation. This LPN offered patient a boost in which he refused. Patient also refusing medications crushed in puree.   "

## 2025-03-16 NOTE — ASSESSMENT & PLAN NOTE
"States that he has difficulty with swallowing secondary to being \"hunched over\" all the time and also said that he had thrush recently.   Said he only eats very soft foods at home. Will place on pureed diet and adjust per speech recs.   "

## 2025-03-16 NOTE — ED PROVIDER NOTES
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  2:08 PM Mook Bedoya [J96.11] Chronic respiratory failure with hypoxia ([facility])     3/16/2025  2:08 PM Mook Bedoya [R53.1] General weakness     3/16/2025  2:08 PM Mook Bedoya [R29.6] Frequent falls     3/16/2025  3:59 PM Mamie Lara [R26.2] Ambulatory dysfunction     3/16/2025  4:00 PM Mamie Lara [J44.9] COPD, severe ([facility])                  Mook Bedoya MD  03/16/25 [id number]

## 2025-03-16 NOTE — ED NOTES
Patient c/o of chronic bed sore. Patient repositioned with pillows under both sides and requesting ice pack. Ice pack put into place at this time.     Allison A Schoener, RN  03/16/25 1732

## 2025-03-16 NOTE — ASSESSMENT & PLAN NOTE
History of htn on benicar 5 mg qd, lasix 80 mg bid, spironolactone 100 mg qd  BP acceptable in the ED, will hold spironolactone, continue with lasix at this time per CHF guidelines, hold benicar  Avoid hypotension

## 2025-03-16 NOTE — ASSESSMENT & PLAN NOTE
Wt Readings from Last 3 Encounters:   03/16/25 91.1 kg (200 lb 12.8 oz)   05/18/24 118 kg (261 lb)   03/31/24 121 kg (267 lb 9.6 oz)     Presents to ED with frequent falls, chest pain from fall, increased SOB, increased leg swelling  ED diuresed with lasix 20 mg IV x 2.   Takes lasix 80 mg BID, spironolactone 100 mg qd for diuresis as outpatient however states that he hasn't been taking these recently.   CXR Chronic left lower lung atelectasis. No new focal consolidation, pleural effusion, or pneumothorax.  BNP normal  EKG NSR  ECHO 8/2024: There is mild concentric hypertrophy. Systolic function is normal with an ejection fraction of 55-60%. Wall   motion is within normal limits.   Repeat ECHO ordered  Continue diuresis with lasix gtt at this time  Daily weights, Strict I & Os  Cardiac diet, low sodium <2g, fluid restriction <1500  Consider cardiology consult pending C discussions.

## 2025-03-16 NOTE — H&P
H&P - Hospitalist   Name: Tommie Ramirez 69 y.o. male I MRN: [id number]  Unit/Bed#: -01 I Date of Admission: 3/16/2025   Date of Service: 3/16/2025 I Hospital Day: 0     Assessment & Plan  Acute on chronic diastolic (congestive) heart failure (HCC)  Wt Readings from Last 3 Encounters:   03/16/25 91.1 kg (200 lb 12.8 oz)   05/18/24 118 kg (261 lb)   03/31/24 121 kg (267 lb 9.6 oz)     Presents to ED with frequent falls, chest pain from fall, increased SOB, increased leg swelling  ED diuresed with lasix 20 mg IV x 2.   Takes lasix 80 mg BID, spironolactone 100 mg qd for diuresis as outpatient however states that he hasn't been taking these recently.   CXR Chronic left lower lung atelectasis. No new focal consolidation, pleural effusion, or pneumothorax.  BNP normal  EKG NSR  ECHO 8/2024: There is mild concentric hypertrophy. Systolic function is normal with an ejection fraction of 55-60%. Wall   motion is within normal limits.   Repeat ECHO ordered  Continue diuresis with lasix gtt at this time  Daily weights, Strict I & Os  Cardiac diet, low sodium <2g, fluid restriction <1500  Consider cardiology consult pending [facility] discussions.   Hyponatremia  Presents to ED with fall from home, pain all over, stopped taking his lasix  Sodium on admission 126  hypervolemic on initial exam   Urine sodium, urine creatinine, urine osmo, and serum osmo ordered  TSH normal   Fluid restriction   Hold IVF, start on lasix and monitor sodium closely  Monitor BMP q12h  Consider Nephrology evaluation to assist with safe, slow correction    Lab Results   Component Value Date    SODIUM 126 (L) 03/16/2025    SODIUM 135 08/09/2024    SODIUM 134 (L) 08/08/2024     Ambulatory dysfunction  POA with complaints of frequent falls at home, pain in the chest from falling and hitting ribs. States that he has had multiple falls in the last 2 days. States that he did have headstrike but not LOC. Has been having difficulty getting up after falls  "and requiring assistance.   PT/OT eval  Fall precautions  Goals of care, counseling/discussion  Patient states that he was on hospice outpatient, patient stating that he would like treatment at this time and revoking hospice. He would like to be full code and talk with palliative care.   Palliative care consult placed  Dysphagia  States that he has difficulty with swallowing secondary to being \"hunched over\" all the time and also said that he had thrush recently.   Said he only eats very soft foods at home. Will place on pureed diet and adjust per speech recs.   COPD, severe (HCC)  Not in exacerbation continue home regimen  Recommend following with his outpatient Pulmonologist if patient still follows, states he was on hospice outpatient. Further GOC discussions to be had.   Essential hypertension  History of htn on benicar 5 mg qd, lasix 80 mg bid, spironolactone 100 mg qd  BP acceptable in the ED, will hold spironolactone, continue with lasix at this time per CHF guidelines, hold benicar  Avoid hypotension  Chronic respiratory failure with hypoxia (HCC)  History of chronic respiratory failure and severe copd. Chronically on 3L NC, currently on baseline o2 requirements  CXR: Chronic left lower lung atelectasis. No new focal consolidation, pleural effusion, or pneumothorax.  COVID/Flu/RSV negative  Does have leukocytosis of 12.36 on admission, procal negative. No evidence of infection  BC ordered  Continue to monitor o2 saturations for goal saturation >89%.     VTE Pharmacologic Prophylaxis: VTE Score: 5 High Risk (Score >/= 5) - Pharmacological DVT Prophylaxis Ordered: enoxaparin (Lovenox). Sequential Compression Devices Ordered.  Code Status: Level 1 - Full Code   Discussion with family: Updated  (wife) at bedside.    Anticipated Length of Stay: Patient will be admitted on an inpatient basis with an anticipated length of stay of greater than 2 midnights secondary to chf exacerbation, ambulatory " dysfunction, frequent falls.    History of Present Illness   Chief Complaint: falls     Tommie Ramirez is a 69 y.o. male with a PMH of hyperlipidemia, HTN, hypothyroidism, pulmonary emphysema, heart failure who presents with multiple falls and chest pain that occurred after falls. Patient states that he had multiple falls over the last few nights requiring assistance for getting up. Said that he did hit his head but did not lose consciousness. Said that he currently has pain all over but mostly complaining of pain right side of his back. Said that he has been feeling more SOB than normal as well. Chronically feels SOB and is unable to lay flat at baseline, but said it has been getting worse. Has been having increasing leg swelling too. Did recently stop taking his lasix because he felt it was not working. No fever, chills, nausea, vomiting, diarrhea, constipation, abdominal pain. Of note, states that he has been having difficulty with swallowing, does very mashed up food at home. Also states that he was on hospice prior to coming into the hospital. Said that he had been having     Review of Systems   Constitutional:  Positive for activity change and fatigue. Negative for appetite change, chills, fever and unexpected weight change.   HENT: Negative.     Eyes: Negative.    Respiratory:  Positive for cough and shortness of breath. Negative for wheezing.    Cardiovascular:  Positive for chest pain and leg swelling. Negative for palpitations.   Gastrointestinal: Negative.    Endocrine: Negative.    Genitourinary: Negative.    Musculoskeletal: Negative.    Skin: Negative.    Allergic/Immunologic: Negative.    Neurological:  Positive for weakness (multiple falls and weakness). Negative for dizziness, syncope, light-headedness and headaches.   Hematological: Negative.    Psychiatric/Behavioral: Negative.         Historical Information   Past Medical History:   Diagnosis Date    COPD (chronic obstructive pulmonary  disease) (HCC)     Diastolic heart failure (HCC)     HTN (hypertension)     Hyperlipidemia     Obesity     Polycythemia      Past Surgical History:   Procedure Laterality Date    ARTHROSCOPY KNEE Left     COLONOSCOPY       Social History     Tobacco Use    Smoking status: Former     Current packs/day: 0.00     Types: Cigarettes     Quit date: 2022     Years since quittin.8    Smokeless tobacco: Never   Vaping Use    Vaping status: Never Used   Substance and Sexual Activity    Alcohol use: Not Currently    Drug use: Not Currently    Sexual activity: Not Currently     E-Cigarette/Vaping    E-Cigarette Use Never User      E-Cigarette/Vaping Substances     Family History   Problem Relation Age of Onset    Heart attack Mother     Heart disease Brother      Social History:  Marital Status: /Civil Union   Occupation:   Patient Pre-hospital Living Situation: Home  Patient Pre-hospital Level of Mobility: unable to be assessed at time of evaluation  Patient Pre-hospital Diet Restrictions:     Meds/Allergies   I have reviewed home medications with patient personally.  Prior to Admission medications    Medication Sig Start Date End Date Taking? Authorizing Provider   lidocaine (LIDODERM) 5 % Apply 1 patch topically daily Remove & Discard patch within 12 hours or as directed by MD Do not start before 2023.  Patient taking differently: Apply 1 patch topically if needed Remove & Discard patch within 12 hours or as directed by MD 23  Yes Kar Martinez MD   albuterol (PROVENTIL HFA,VENTOLIN HFA) 90 mcg/act inhaler Inhale 2 puffs 4 (four) times a day 6/3/22   Charisma Torres MD   aspirin 81 mg chewable tablet Chew 81 mg 2 (two) times a day  Patient not taking: Reported on 3/16/2025    Historical Provider, MD GORDILLO Complex CAPS Take 1 capsule by mouth every other day    Historical Provider, MD   budesonide (Rhinocort Allergy) 32 MCG/ACT nasal spray 1 spray into each nostril daily 23   Kar YEH  MD Juan   cyanocobalamin (VITAMIN B-12) 500 MCG tablet Take 500 mcg by mouth every other day    Historical Provider, MD   fluticasone (Arnuity Ellipta) 200 MCG/ACT AEPB inhaler Inhale 1 puff daily Rinse mouth after use.    Historical Provider, MD   furosemide (LASIX) 40 mg tablet Take 3 tablets (120 mg total) by mouth daily 4/1/24 5/18/24  Nacho Edwardslas Counts, DO   levothyroxine 25 mcg tablet Take 25 mcg by mouth daily    Historical Provider, MD   magnesium gluconate (MAGONATE) 500 mg tablet Take 133 mg by mouth 2 (two) times a day    Historical Provider, MD   MOMETASONE FUROATE IN Inhale 2 sprays 2 (two) times a day    Historical Provider, MD   Multiple Vitamin (Multi Vitamin Daily) TABS Take 1 tablet by mouth daily    Historical Provider, MD   nicotine (NICODERM CQ) 21 mg/24 hr TD 24 hr patch Place 1 patch on the skin daily 6/4/22   Charisma Torres MD   olmesartan (BENICAR) 5 mg tablet Take 1 tablet by mouth daily 12/29/21   Historical Provider, MD   Omega-3 Fatty Acids (fish oil) 1,000 mg Take 1,000 mg by mouth see administration instructions Monday wedbnesday and fridays    Historical Provider, MD   oxymetazoline (AFRIN) 0.05 % nasal spray 2 sprays by Each Nare route 2 (two) times a day for 3 days 5/18/24 5/21/24  Rosaura Carpio PA-C   pantoprazole (PROTONIX) 40 mg tablet Take 40 mg by mouth 2 (two) times a day    Historical Provider, MD   Potassium 99 MG TABS Take 1 tablet (99 mg total) by mouth in the morning 3/31/24   Nacho Edwardslas Counts, DO   Probiotic Product (Misc Intestinal Kajal Regulat) CAPS Take 1 capsule by mouth in the morning    Historical Provider, MD   selenium 50 MCG TABS Take 50 mcg by mouth every other day    Historical Provider, MD   sodium chloride (OCEAN) 0.65 % nasal spray 1 spray into each nostril every hour as needed for congestion 1/5/23   Kar Martinez MD   spironolactone (ALDACTONE) 25 mg tablet Take 1 tablet (25 mg total) by mouth daily 4/1/24   Nacho Edwardslas Counts, DO   vitamin  E, tocopherol, 400 units capsule Take 400 Units by mouth once a week    Historical Provider, MD     Allergies   Allergen Reactions    Clonidine Anaphylaxis    Other Anaphylaxis     bees    Lansoprazole Rash     Rash       Torsemide Other (See Comments)     Pain in both kidneys    Ampicillin Rash     Rash      Azithromycin Hives and Rash    Codeine Nausea Only and Vomiting     n/v  pill form only; cough medicine okay      Levaquin [Levofloxacin] Hives    Lisinopril Cough     cough      Oxycodone-Acetaminophen Headache     headache    Prednisone Other (See Comments)     unfriendly    Ranitidine Rash     Rash forehead, pepcid      Statins Other (See Comments) and Myalgia     Elevated bp  elevates bp      Sulfa Antibiotics Other (See Comments)     ?       Objective :  Temp:  [98.9 °F (37.2 °C)] 98.9 °F (37.2 °C)  HR:  [55-83] 55  BP: (100-138)/(60-72) 112/67  Resp:  [18-20] 18  SpO2:  [93 %-100 %] 99 %  O2 Device: Nasal cannula  Nasal Cannula O2 Flow Rate (L/min):  [3 L/min] 3 L/min    Physical Exam  Vitals reviewed.   Constitutional:       General: He is not in acute distress.     Appearance: He is ill-appearing. He is not toxic-appearing.   HENT:      Head: Normocephalic and atraumatic.      Mouth/Throat:      Mouth: Mucous membranes are dry.   Cardiovascular:      Rate and Rhythm: Normal rate and regular rhythm.      Heart sounds: Murmur heard.   Pulmonary:      Effort: No respiratory distress.      Breath sounds: No stridor. No wheezing, rhonchi or rales.      Comments: Very decreased breath sounds bilaterally, saturating well on 3L NC  No labored breathing noted  Abdominal:      General: Bowel sounds are normal. There is no distension.      Palpations: Abdomen is soft. There is no mass.      Tenderness: There is no abdominal tenderness.   Genitourinary:     Comments: Lucas catheter with clear yellow urine output  Musculoskeletal:      Right lower leg: Edema present.      Left lower leg: Edema present.   Skin:      General: Skin is warm and dry.   Neurological:      Mental Status: He is alert and oriented to person, place, and time.          Lines/Drains:  Lines/Drains/Airways       Active Status       Name Placement date Placement time Site Days    Urethral Catheter 18 Fr. 03/16/25  1318  --  less than 1                  Urinary Catheter:  Goal for removal: Remove after 48 hrs of I/O monitoring               Lab Results: I have reviewed the following results:  Results from last 7 days   Lab Units 03/16/25  1002   WBC Thousand/uL 12.36*   HEMOGLOBIN g/dL 14.2   HEMATOCRIT % 43.8   PLATELETS Thousands/uL 251   SEGS PCT % 79*   LYMPHO PCT % 6*   MONO PCT % 15*   EOS PCT % 0     Results from last 7 days   Lab Units 03/16/25  1002   SODIUM mmol/L 126*   POTASSIUM mmol/L 3.9   CHLORIDE mmol/L 79*   CO2 mmol/L 35*   BUN mg/dL 10   CREATININE mg/dL 0.59*   ANION GAP mmol/L 12   CALCIUM mg/dL 11.3*   ALBUMIN g/dL 3.3*   TOTAL BILIRUBIN mg/dL 0.68   ALK PHOS U/L 67   ALT U/L 16   AST U/L 28   GLUCOSE RANDOM mg/dL 96             Lab Results   Component Value Date    HGBA1C 5.5 08/07/2024    HGBA1C 5.9 (H) 12/19/2022    HGBA1C 6.0 (H) 07/13/2022     Results from last 7 days   Lab Units 03/16/25  1002   LACTIC ACID mmol/L 1.5   PROCALCITONIN ng/ml 0.06       Imaging Results Review: I reviewed radiology reports from this admission including: chest xray.  Other Study Results Review: EKG was reviewed.  EKG was personally reviewed and my interpretation is: Personally Reviewed. NSR. HR 70..    Administrative Statements   I have spent a total time of 65 minutes in caring for this patient on the day of the visit/encounter including Diagnostic results, Prognosis, Risks and benefits of tx options, Instructions for management, Patient and family education, Importance of tx compliance, Risk factor reductions, Impressions, Counseling / Coordination of care, Documenting in the medical record, Reviewing/placing orders in the medical record (including  tests, medications, and/or procedures), and Obtaining or reviewing history  .    ** Please Note: This note has been constructed using a voice recognition system. **

## 2025-03-16 NOTE — PLAN OF CARE
Problem: Potential for Falls  Goal: Patient will remain free of falls  Description: INTERVENTIONS:  - Educate patient/family on patient safety including physical limitations  - Instruct patient to call for assistance with activity   - Consult OT/PT to assist with strengthening/mobility   - Keep Call bell within reach  - Keep bed low and locked with side rails adjusted as appropriate  - Keep care items and personal belongings within reach  - Initiate and maintain comfort rounds  - Make Fall Risk Sign visible to staff  - Offer Toileting every  Hours, in advance of need  - Initiate/Maintain alarm  - Obtain necessary fall risk management equipment:   - Apply yellow socks and bracelet for high fall risk patients  - Consider moving patient to room near nurses station  Outcome: Progressing     Problem: Prexisting or High Potential for Compromised Skin Integrity  Goal: Skin integrity is maintained or improved  Description: INTERVENTIONS:  - Identify patients at risk for skin breakdown  - Assess and monitor skin integrity  - Assess and monitor nutrition and hydration status  - Monitor labs   - Assess for incontinence   - Turn and reposition patient  - Assist with mobility/ambulation  - Relieve pressure over bony prominences  - Avoid friction and shearing  - Provide appropriate hygiene as needed including keeping skin clean and dry  - Evaluate need for skin moisturizer/barrier cream  - Collaborate with interdisciplinary team   - Patient/family teaching  - Consider wound care consult   Outcome: Progressing     Problem: PAIN - ADULT  Goal: Verbalizes/displays adequate comfort level or baseline comfort level  Description: Interventions:  - Encourage patient to monitor pain and request assistance  - Assess pain using appropriate pain scale  - Administer analgesics based on type and severity of pain and evaluate response  - Implement non-pharmacological measures as appropriate and evaluate response  - Consider cultural and  social influences on pain and pain management  - Notify physician/advanced practitioner if interventions unsuccessful or patient reports new pain  Outcome: Progressing     Problem: SAFETY ADULT  Goal: Patient will remain free of falls  Description: INTERVENTIONS:  - Educate patient/family on patient safety including physical limitations  - Instruct patient to call for assistance with activity   - Consult OT/PT to assist with strengthening/mobility   - Keep Call bell within reach  - Keep bed low and locked with side rails adjusted as appropriate  - Keep care items and personal belongings within reach  - Initiate and maintain comfort rounds  - Make Fall Risk Sign visible to staff  - Offer Toileting every  Hours, in advance of need  - Initiate/Maintain alarm  - Obtain necessary fall risk management equipment  - Apply yellow socks and bracelet for high fall risk patients  - Consider moving patient to room near nurses station  Outcome: Progressing  Goal: Maintain or return to baseline ADL function  Description: INTERVENTIONS:  -  Assess patient's ability to carry out ADLs; assess patient's baseline for ADL function and identify physical deficits which impact ability to perform ADLs (bathing, care of mouth/teeth, toileting, grooming, dressing, etc.)  - Assess/evaluate cause of self-care deficits   - Assess range of motion  - Assess patient's mobility; develop plan if impaired  - Assess patient's need for assistive devices and provide as appropriate  - Encourage maximum independence but intervene and supervise when necessary  - Involve family in performance of ADLs  - Assess for home care needs following discharge   - Consider OT consult to assist with ADL evaluation and planning for discharge  - Provide patient education as appropriate  Outcome: Progressing  Goal: Maintains/Returns to pre admission functional level  Description: INTERVENTIONS:  - Perform AM-PAC 6 Click Basic Mobility/ Daily Activity assessment daily.  -  Set and communicate daily mobility goal to care team and patient/family/caregiver.   - Collaborate with rehabilitation services on mobility goals if consulted  - Perform Range of Motion  times a day.  - Reposition patient every  hours.  - Dangle patient  times a day  - Stand patient  times a day  - Ambulate patient  times a day  - Out of bed to chair  times a day   - Out of bed for meals  times a day  - Out of bed for toileting  - Record patient progress and toleration of activity level   Outcome: Progressing

## 2025-03-16 NOTE — ASSESSMENT & PLAN NOTE
Not in exacerbation continue home regimen  Recommend following with his outpatient Pulmonologist if patient still follows, states he was on hospice outpatient. Further GOC discussions to be had.

## 2025-03-16 NOTE — ASSESSMENT & PLAN NOTE
History of chronic respiratory failure and severe copd. Chronically on 3L NC, currently on baseline o2 requirements  CXR: Chronic left lower lung atelectasis. No new focal consolidation, pleural effusion, or pneumothorax.  COVID/Flu/RSV negative  Does have leukocytosis of 12.36 on admission, procal negative. No evidence of infection  BC ordered  Continue to monitor o2 saturations for goal saturation >89%.

## 2025-03-16 NOTE — ED NOTES
Patient continues to c/o buttock pain. Patient not able to tolerate any position other than sitting on his buttock.     Allison A Schoener, RN  03/16/25 [id number]

## 2025-03-16 NOTE — ASSESSMENT & PLAN NOTE
Presents to ED with fall from home, pain all over, stopped taking his lasix  Sodium on admission 126  hypervolemic on initial exam   Urine sodium, urine creatinine, urine osmo, and serum osmo ordered  TSH normal   Fluid restriction   Hold IVF, start on lasix and monitor sodium closely  Monitor BMP q12h  Consider Nephrology evaluation to assist with safe, slow correction    Lab Results   Component Value Date    SODIUM 126 (L) 03/16/2025    SODIUM 135 08/09/2024    SODIUM 134 (L) 08/08/2024

## 2025-03-17 ENCOUNTER — APPOINTMENT (INPATIENT)
Dept: NON INVASIVE DIAGNOSTICS | Facility: HOSPITAL | Age: 70
DRG: 291 | End: 2025-03-17
Payer: MEDICARE

## 2025-03-17 PROBLEM — R00.0 TACHYCARDIA: Status: ACTIVE | Noted: 2025-03-17

## 2025-03-17 PROBLEM — Z51.5 PALLIATIVE CARE BY SPECIALIST: Status: ACTIVE | Noted: 2025-03-17

## 2025-03-17 LAB
ANION GAP SERPL CALCULATED.3IONS-SCNC: 11 MMOL/L (ref 4–13)
ANION GAP SERPL CALCULATED.3IONS-SCNC: 13 MMOL/L (ref 4–13)
AORTIC ROOT: 2.8 CM
AORTIC VALVE MEAN VELOCITY: 19.1 M/S
ASCENDING AORTA: 3 CM
ATRIAL RATE: 73 BPM
AV AREA BY CONTINUOUS VTI: 1 CM2
AV AREA PEAK VELOCITY: 1 CM2
AV LVOT MEAN GRADIENT: 2 MMHG
AV LVOT PEAK GRADIENT: 4 MMHG
AV MEAN PRESS GRAD SYS DOP V1V2: 17 MMHG
AV ORIFICE AREA US: 0.95 CM2
AV PEAK GRADIENT: 30 MMHG
AV VELOCITY RATIO: 0.38
AV VMAX SYS DOP: 3.1 M/S
BSA FOR ECHO PROCEDURE: 2 M2
BUN SERPL-MCNC: 11 MG/DL (ref 5–25)
BUN SERPL-MCNC: 9 MG/DL (ref 5–25)
CALCIUM SERPL-MCNC: 10.6 MG/DL (ref 8.4–10.2)
CALCIUM SERPL-MCNC: 11.1 MG/DL (ref 8.4–10.2)
CHLORIDE SERPL-SCNC: 75 MMOL/L (ref 96–108)
CHLORIDE SERPL-SCNC: 78 MMOL/L (ref 96–108)
CO2 SERPL-SCNC: 45 MMOL/L (ref 21–32)
CO2 SERPL-SCNC: 45 MMOL/L (ref 21–32)
CREAT SERPL-MCNC: 0.62 MG/DL (ref 0.6–1.3)
CREAT SERPL-MCNC: 0.73 MG/DL (ref 0.6–1.3)
DOP CALC AO VTI: 46.34 CM
DOP CALC LVOT AREA: 2.54 CM2
DOP CALC LVOT CARDIAC INDEX: 2.15 L/MIN/M2
DOP CALC LVOT CARDIAC OUTPUT: 4.3 L/MIN
DOP CALC LVOT DIAMETER: 1.8 CM
DOP CALC LVOT PEAK VEL VTI: 17.39 CM
DOP CALC LVOT PEAK VEL: 1.03 M/S
DOP CALC LVOT STROKE INDEX: 23 ML/M2
DOP CALC LVOT STROKE VOLUME: 44.23
E WAVE DECELERATION TIME: 295 MS
E/A RATIO: 0.58
ERYTHROCYTE [DISTWIDTH] IN BLOOD BY AUTOMATED COUNT: 13.6 % (ref 11.6–15.1)
FRACTIONAL SHORTENING: 47 (ref 28–44)
GFR SERPL CREATININE-BSD FRML MDRD: 100 ML/MIN/1.73SQ M
GFR SERPL CREATININE-BSD FRML MDRD: 93 ML/MIN/1.73SQ M
GLUCOSE SERPL-MCNC: 103 MG/DL (ref 65–140)
GLUCOSE SERPL-MCNC: 95 MG/DL (ref 65–140)
HCT VFR BLD AUTO: 51.2 % (ref 36.5–49.3)
HGB BLD-MCNC: 16.2 G/DL (ref 12–17)
INTERVENTRICULAR SEPTUM IN DIASTOLE (PARASTERNAL SHORT AXIS VIEW): 1.1 CM
INTERVENTRICULAR SEPTUM: 1.1 CM (ref 0.6–1.1)
LAAS-AP2: 13.3 CM2
LAAS-AP4: 14.1 CM2
LEFT ATRIUM SIZE: 3 CM
LEFT ATRIUM VOLUME (MOD BIPLANE): 32 ML
LEFT ATRIUM VOLUME INDEX (MOD BIPLANE): 16 ML/M2
LEFT INTERNAL DIMENSION IN SYSTOLE: 2 CM (ref 2.1–4)
LEFT VENTRICLE DIASTOLIC VOLUME (MOD BIPLANE): 60 ML
LEFT VENTRICLE DIASTOLIC VOLUME INDEX (MOD BIPLANE): 30 ML/M2
LEFT VENTRICLE SYSTOLIC VOLUME (MOD BIPLANE): 19 ML
LEFT VENTRICLE SYSTOLIC VOLUME INDEX (MOD BIPLANE): 9.5 ML/M2
LEFT VENTRICULAR INTERNAL DIMENSION IN DIASTOLE: 3.8 CM (ref 3.5–6)
LEFT VENTRICULAR POSTERIOR WALL IN END DIASTOLE: 1.1 CM
LEFT VENTRICULAR STROKE VOLUME: 49 ML
LV EF BIPLANE MOD: 69 %
LV EF US.2D.A4C+ESTIMATED: 71 %
LVSV (TEICH): 49 ML
MAGNESIUM SERPL-MCNC: 1.7 MG/DL (ref 1.9–2.7)
MAGNESIUM SERPL-MCNC: 2.1 MG/DL (ref 1.9–2.7)
MCH RBC QN AUTO: 29.6 PG (ref 26.8–34.3)
MCHC RBC AUTO-ENTMCNC: 31.6 G/DL (ref 31.4–37.4)
MCV RBC AUTO: 93 FL (ref 82–98)
MV E'TISSUE VEL-LAT: 9 CM/S
MV E'TISSUE VEL-SEP: 9 CM/S
MV PEAK A VEL: 1.18 M/S
MV PEAK E VEL: 68 CM/S
MV STENOSIS PRESSURE HALF TIME: 86 MS
MV VALVE AREA P 1/2 METHOD: 2.56
P AXIS: 60 DEGREES
PLATELET # BLD AUTO: 250 THOUSANDS/UL (ref 149–390)
PMV BLD AUTO: 9.2 FL (ref 8.9–12.7)
POTASSIUM SERPL-SCNC: 2.7 MMOL/L (ref 3.5–5.3)
POTASSIUM SERPL-SCNC: 2.9 MMOL/L (ref 3.5–5.3)
PR INTERVAL: 200 MS
QRS AXIS: 17 DEGREES
QRSD INTERVAL: 74 MS
QT INTERVAL: 388 MS
QTC INTERVAL: 427 MS
RBC # BLD AUTO: 5.48 MILLION/UL (ref 3.88–5.62)
RIGHT ATRIUM AREA SYSTOLE A4C: 11.2 CM2
RIGHT VENTRICLE ID DIMENSION: 2.9 CM
SL CV LEFT ATRIUM LENGTH A2C: 4.6 CM
SL CV LV EF: 69
SL CV PED ECHO LEFT VENTRICLE DIASTOLIC VOLUME (MOD BIPLANE) 2D: 62 ML
SL CV PED ECHO LEFT VENTRICLE SYSTOLIC VOLUME (MOD BIPLANE) 2D: 13 ML
SODIUM SERPL-SCNC: 133 MMOL/L (ref 135–147)
SODIUM SERPL-SCNC: 134 MMOL/L (ref 135–147)
T WAVE AXIS: 40 DEGREES
TRICUSPID ANNULAR PLANE SYSTOLIC EXCURSION: 1.5 CM
VENTRICULAR RATE: 73 BPM
WBC # BLD AUTO: 13.92 THOUSAND/UL (ref 4.31–10.16)

## 2025-03-17 PROCEDURE — 83735 ASSAY OF MAGNESIUM: CPT

## 2025-03-17 PROCEDURE — 99232 SBSQ HOSP IP/OBS MODERATE 35: CPT

## 2025-03-17 PROCEDURE — 92610 EVALUATE SWALLOWING FUNCTION: CPT

## 2025-03-17 PROCEDURE — 85027 COMPLETE CBC AUTOMATED: CPT

## 2025-03-17 PROCEDURE — 93306 TTE W/DOPPLER COMPLETE: CPT

## 2025-03-17 PROCEDURE — 80048 BASIC METABOLIC PNL TOTAL CA: CPT

## 2025-03-17 RX ORDER — POTASSIUM CHLORIDE 14.9 MG/ML
20 INJECTION INTRAVENOUS ONCE
Status: COMPLETED | OUTPATIENT
Start: 2025-03-17 | End: 2025-03-18

## 2025-03-17 RX ORDER — MAGNESIUM SULFATE HEPTAHYDRATE 40 MG/ML
2 INJECTION, SOLUTION INTRAVENOUS ONCE
Status: COMPLETED | OUTPATIENT
Start: 2025-03-17 | End: 2025-03-18

## 2025-03-17 RX ORDER — METOPROLOL TARTRATE 1 MG/ML
5 INJECTION, SOLUTION INTRAVENOUS EVERY 6 HOURS
Status: DISCONTINUED | OUTPATIENT
Start: 2025-03-17 | End: 2025-03-17

## 2025-03-17 RX ORDER — ACETAMINOPHEN 10 MG/ML
1000 INJECTION, SOLUTION INTRAVENOUS ONCE
Status: COMPLETED | OUTPATIENT
Start: 2025-03-17 | End: 2025-03-17

## 2025-03-17 RX ORDER — POTASSIUM CHLORIDE 14.9 MG/ML
20 INJECTION INTRAVENOUS ONCE
Status: COMPLETED | OUTPATIENT
Start: 2025-03-18 | End: 2025-03-18

## 2025-03-17 RX ORDER — ACETAMINOPHEN 10 MG/ML
1000 INJECTION, SOLUTION INTRAVENOUS ONCE
Status: COMPLETED | OUTPATIENT
Start: 2025-03-17 | End: 2025-03-18

## 2025-03-17 RX ORDER — METOPROLOL TARTRATE 1 MG/ML
5 INJECTION, SOLUTION INTRAVENOUS EVERY 6 HOURS PRN
Status: DISCONTINUED | OUTPATIENT
Start: 2025-03-17 | End: 2025-03-18 | Stop reason: HOSPADM

## 2025-03-17 RX ORDER — BISACODYL 10 MG
10 SUPPOSITORY, RECTAL RECTAL DAILY PRN
Status: DISCONTINUED | OUTPATIENT
Start: 2025-03-17 | End: 2025-03-18 | Stop reason: HOSPADM

## 2025-03-17 RX ORDER — POTASSIUM CHLORIDE 14.9 MG/ML
20 INJECTION INTRAVENOUS ONCE
Status: COMPLETED | OUTPATIENT
Start: 2025-03-17 | End: 2025-03-17

## 2025-03-17 RX ORDER — METOPROLOL TARTRATE 1 MG/ML
5 INJECTION, SOLUTION INTRAVENOUS ONCE
Status: COMPLETED | OUTPATIENT
Start: 2025-03-17 | End: 2025-03-17

## 2025-03-17 RX ADMIN — Medication 2 G: at 16:23

## 2025-03-17 RX ADMIN — POTASSIUM CHLORIDE 20 MEQ: 14.9 INJECTION, SOLUTION INTRAVENOUS at 12:21

## 2025-03-17 RX ADMIN — ENOXAPARIN SODIUM 40 MG: 40 INJECTION SUBCUTANEOUS at 10:10

## 2025-03-17 RX ADMIN — ACETAMINOPHEN 1000 MG: 1000 INJECTION, SOLUTION INTRAVENOUS at 21:41

## 2025-03-17 RX ADMIN — ACETAMINOPHEN 1000 MG: 1000 INJECTION, SOLUTION INTRAVENOUS at 01:05

## 2025-03-17 RX ADMIN — BISACODYL 10 MG: 10 SUPPOSITORY RECTAL at 15:42

## 2025-03-17 RX ADMIN — NICOTINE 14 MG: 14 PATCH, EXTENDED RELEASE TRANSDERMAL at 10:10

## 2025-03-17 RX ADMIN — POTASSIUM CHLORIDE 20 MEQ: 14.9 INJECTION, SOLUTION INTRAVENOUS at 15:34

## 2025-03-17 RX ADMIN — Medication 2 G: at 21:47

## 2025-03-17 RX ADMIN — POTASSIUM CHLORIDE 20 MEQ: 14.9 INJECTION, SOLUTION INTRAVENOUS at 23:19

## 2025-03-17 RX ADMIN — MAGNESIUM SULFATE HEPTAHYDRATE 2 G: 40 INJECTION, SOLUTION INTRAVENOUS at 17:30

## 2025-03-17 RX ADMIN — METOROPROLOL TARTRATE 5 MG: 5 INJECTION, SOLUTION INTRAVENOUS at 10:34

## 2025-03-17 RX ADMIN — Medication 10 MG/HR: at 21:45

## 2025-03-17 NOTE — CONSULTS
Consultation - Palliative Care   Name: Tommie Ramirez 69 y.o. male I MRN: [id number]  Unit/Bed#: -01 I Date of Admission: 3/16/2025   Date of Service: 3/17/2025 I Hospital Day: 1   Inpatient consult to Palliative Care  Consult performed by: Rony Anaya PA-C  Consult ordered by: Mamie Lara PA-C      Physician Requesting Evaluation: Nikole Green MD   Reason for Evaluation / Principal Problem: GOC / CHF      VIRTUAL CARE DOCUMENTATION:     1. This service was provided via Telemedicine using Other: EPIC embedded platform      2. Parties in the room with patient during teleconsult Family member: spouse     3. Confidentiality My office door was closed     4. Participants No one else was in the room    5. Patient acknowledged consent and understanding of privacy and security of the  Telemedicine consult. I informed the patient that I have reviewed their record in Epic and presented the opportunity for them to ask any questions regarding the visit today.  The patient agreed to participate.    6. Time spent: 30 min       Assessment & Plan  Goals of care, counseling/discussion  Decisional apparatus: Patient has questionable capacity on exam today.  If capacity is lost, patient's substitute decision maker would default to spouse by [place] Act 169.  Advance Directive / Living Will / POLST / POA Forms: None on file    Goals  Level 1 code status.   Full code.   Disease focused care.   No limits placed.   Goals unclear d/t limited/interrupted discussions over several video visit attempts  Pt and spouse are considering returning home on hospice with [facility] hospice  Continue current medical management and work up  Pt says that he wants to go home  Reviewed CODE status, no answer provided. Continue full code.   Further GOC discussions needed.   Acute on chronic diastolic (congestive) heart failure (HCC)  Wt Readings from Last 3 Encounters:   03/17/25 90.7 kg (200 lb)   05/18/24 118 kg (261 lb)   03/31/24  121 kg (267 lb 9.6 oz)   Mgmt per primary team  Chronic respiratory failure with hypoxia (HCC)  Hx of severe COPD  Not in acute exacerbation  Dysphagia  Speech following, see note  Palliative care by specialist  Social support  Patient is supported by spouse  Supportive listening provided  Normalized experience of patient/family  Provided anxiety containment  Provided anticipatory guidance  Investigated spiritual needs - discussed with     Follow up  Palliative Care will continue to follow and goals of care discussions will be ongoing.    Please reach out to on-call provider via Epic Secure Chat  if questions or concerns arise.  Please do not hesitate to reach our on call provider through our clinic answering service at [phone] should you have acute symptom control concerns.      PDMP Review: I have reviewed the patient's controlled substance dispensing history in the Prescription Drug Monitoring Program in compliance with the [place] regulations before prescribing any controlled substances.  Filled  Written  ID  Drug  QTY  Days  Prescriber  RX #  Dispenser  Refill  Daily Dose*  Pymt Type      03/14/2025 03/14/2025 1 Oxycodone Hcl (Ir) 5 Mg Tablet 30.00 15 Ma Rod [id number] Pin (2511) 0 15.00 MME Comm Ins [place]   03/05/2025 03/04/2025 1 Lorazepam 0.5 Mg Tablet 30.00 4 An Poncho [id number] Pin (2511) 0 3.75 LME Comm Ins [place]   03/05/2025 03/04/2025 1 Morphine Sulf 100 Mg/5 Ml Conc 30.00 20 An Poncho [id number] Pin (2511) 0 30.00 MME Comm Ins [place]     History of Present Illness   HPI: Tommie Ramirez is a 69 y.o. year old male with PMH of pulmonary emphysema, HTN, HLD, hypothyroidism, heart failure who [place] [facility] [place] of multiple falls at home and chest pain. Patient was on home hospice with [place] hospice agency. [facility] team consulted for GOC.     Three separate virtual video sessions were attempted today to review goals of care. Each session was interrupted in some way and our discussions were  limited (pt wanted to move out of bed, use the commode, take another phone call). Though goals remain unclear, the patient and spouse did indicate some interest in returning home soon with [facility] hospice. They had no complaints, concerns, or questions regarding hospice care. They would also like continued work up and management in the hospital. I reviewed CODE status, as patient had elected to be a full code on admission. Unfortunately the visit had to end d/t the patient using the commode before further discussion about this could occur. It was requested that the discussion be revisited tomorrow.     Review of Systems   Musculoskeletal:  Positive for back pain.     Medical History Review: I have reviewed the patient's PMH, PSH, Social History, Family History, Meds, and Allergies   Historical Information   Past Medical History:   Diagnosis Date    COPD (chronic obstructive pulmonary disease) (HCC)     Diastolic heart failure (HCC)     HTN (hypertension)     Hyperlipidemia     Obesity     Polycythemia      Past Surgical History:   Procedure Laterality Date    ARTHROSCOPY KNEE Left     COLONOSCOPY       Social History     Tobacco Use    Smoking status: Former     Current packs/day: 0.00     Types: Cigarettes     Quit date: 2022     Years since quittin.8    Smokeless tobacco: Never   Vaping Use    Vaping status: Never Used   Substance and Sexual Activity    Alcohol use: Not Currently    Drug use: Not Currently    Sexual activity: Not Currently     E-Cigarette/Vaping    E-Cigarette Use Never User      E-Cigarette/Vaping Substances       Social History     Tobacco Use    Smoking status: Former     Current packs/day: 0.00     Types: Cigarettes     Quit date: 2022     Years since quittin.8    Smokeless tobacco: Never   Vaping Use    Vaping status: Never Used   Substance and Sexual Activity    Alcohol use: Not Currently    Drug use: Not Currently    Sexual activity: Not Currently       Current  Facility-Administered Medications:     acetaminophen (TYLENOL) tablet 650 mg, Q4H PRN    aspirin chewable tablet 81 mg, Daily    cyanocobalamin (VITAMIN B-12) tablet 500 mcg, Every Other Day    Diclofenac Sodium (VOLTAREN) 1 % topical gel 2 g, 4x Daily    enoxaparin (LOVENOX) subcutaneous injection 40 mg, Daily    Fluticasone Furoate-Vilanterol 100-25 mcg/actuation 1 puff, Daily **AND** umeclidinium 62.5 mcg/actuation inhaler AEPB 1 puff, Daily    furosemide (LASIX) 500 mg infusion 50 mL, Continuous, Last Rate: 10 mg/hr (03/16/25 1650)    levothyroxine tablet 25 mcg, Early Morning    magnesium sulfate 2 g/50 mL IVPB (premix) 2 g, Once    metoprolol (LOPRESSOR) injection 5 mg, Q6H    nicotine (NICODERM CQ) 14 mg/24hr TD 24 hr patch 14 mg, Daily    pantoprazole (PROTONIX) EC tablet 40 mg, BID AC    potassium chloride 20 mEq IVPB (premix), Once **FOLLOWED BY** potassium chloride 20 mEq IVPB (premix), Once  Prior to Admission Medications   Prescriptions Last Dose Informant Patient Reported? Taking?   B Complex CAPS Not Taking  Yes No   Sig: Take 1 capsule by mouth every other day   Patient not taking: Reported on 3/16/2025   MOMETASONE FUROATE IN Not Taking  Yes No   Sig: Inhale 2 sprays 2 (two) times a day   Patient not taking: Reported on 3/16/2025   Multiple Vitamin (Multi Vitamin Daily) TABS Not Taking  Yes No   Sig: Take 1 tablet by mouth daily   Patient not taking: Reported on 3/16/2025   Omega-3 Fatty Acids (fish oil) 1,000 mg Not Taking  Yes No   Sig: Take 1,000 mg by mouth see administration instructions Monday wedbnesday and fridays   Patient not taking: Reported on 3/16/2025   Potassium 99 MG TABS Not Taking  No No   Sig: Take 1 tablet (99 mg total) by mouth in the morning   Patient not taking: Reported on 3/16/2025   Probiotic Product (Misc Intestinal Kajal Regulat) CAPS Not Taking  Yes No   Sig: Take 1 capsule by mouth in the morning   Patient not taking: Reported on 3/16/2025   albuterol (PROVENTIL  HFA,VENTOLIN HFA) 90 mcg/act inhaler Not Taking  No No   Sig: Inhale 2 puffs 4 (four) times a day   Patient not taking: Reported on 3/16/2025   aspirin 81 mg chewable tablet Not Taking  Yes No   Sig: Chew 81 mg 2 (two) times a day   Patient not taking: Reported on 3/16/2025   budesonide (Rhinocort Allergy) 32 MCG/ACT nasal spray Not Taking  No No   Si spray into each nostril daily   Patient not taking: Reported on 3/16/2025   cyanocobalamin (VITAMIN B-12) 500 MCG tablet Not Taking  Yes No   Sig: Take 500 mcg by mouth every other day   Patient not taking: Reported on 3/16/2025   fluticasone (Arnuity Ellipta) 200 MCG/ACT AEPB inhaler Not Taking  Yes No   Sig: Inhale 1 puff daily Rinse mouth after use.   Patient not taking: Reported on 3/16/2025   fluticasone-umeclidinium-vilanterol (Trelegy Ellipta) 100-62.5-25 mcg/actuation inhaler   Yes Yes   Sig: Inhale 1 puff daily Rinse mouth after use.   furosemide (LASIX) 40 mg tablet   No No   Sig: Take 3 tablets (120 mg total) by mouth daily   ibuprofen (MOTRIN) 100 mg/5 mL suspension Past Week  Yes Yes   Sig: Take by mouth every 6 (six) hours as needed for mild pain   levothyroxine 25 mcg tablet Not Taking  Yes No   Sig: Take 25 mcg by mouth daily   Patient not taking: Reported on 3/16/2025   lidocaine (LIDODERM) 5 % 3/15/2025  No Yes   Sig: Apply 1 patch topically daily Remove & Discard patch within 12 hours or as directed by MD Do not start before 2023.   Patient taking differently: Apply 1 patch topically if needed Remove & Discard patch within 12 hours or as directed by MD   magnesium gluconate (MAGONATE) 500 mg tablet Not Taking Spouse/Significant Other Yes No   Sig: Take 133 mg by mouth 2 (two) times a day   Patient not taking: Reported on 3/16/2025   nicotine (NICODERM CQ) 21 mg/24 hr TD 24 hr patch 3/15/2025 Morning  No Yes   Sig: Place 1 patch on the skin daily   olmesartan (BENICAR) 5 mg tablet Not Taking  Yes No   Sig: Take 1 tablet by mouth daily    Patient not taking: Reported on 3/16/2025   oxymetazoline (AFRIN) 0.05 % nasal spray   No No   Si sprays by Each Nare route 2 (two) times a day for 3 days   pantoprazole (PROTONIX) 40 mg tablet Not Taking  Yes No   Sig: Take 40 mg by mouth 2 (two) times a day   Patient not taking: Reported on 3/16/2025   selenium 50 MCG TABS Not Taking  Yes No   Sig: Take 50 mcg by mouth every other day   Patient not taking: Reported on 3/16/2025   sodium chloride (OCEAN) 0.65 % nasal spray Not Taking  No No   Si spray into each nostril every hour as needed for congestion   Patient not taking: Reported on 3/16/2025   spironolactone (ALDACTONE) 25 mg tablet Not Taking  No No   Sig: Take 1 tablet (25 mg total) by mouth daily   Patient not taking: Reported on 3/16/2025   vitamin E, tocopherol, 400 units capsule Not Taking  Yes No   Sig: Take 400 Units by mouth once a week   Patient not taking: Reported on 3/16/2025      Facility-Administered Medications: None     Clonidine, Other, Lansoprazole, Torsemide, Ampicillin, Azithromycin, Codeine, Levaquin [levofloxacin], Lisinopril, Oxycodone-acetaminophen, Prednisone, Ranitidine, Statins, and Sulfa antibiotics    Objective :  Temp:  [97.5 °F (36.4 °C)-98 °F (36.7 °C)] 97.5 °F (36.4 °C)  HR:  [] 114  BP: ()/(51-74) 94/65  Resp:  [17-20] 18  SpO2:  [93 %-100 %] 99 %  O2 Device: Nasal cannula  Nasal Cannula O2 Flow Rate (L/min):  [3 L/min] 3 L/min    Physical Exam  Vitals and nursing note reviewed.   Constitutional:       Appearance: He is ill-appearing.   HENT:      Head: Normocephalic.   Psychiatric:         Attention and Perception: He is inattentive.         Behavior: Behavior is uncooperative.         Lab Results: I have reviewed the following results:  Lab Results   Component Value Date/Time    SODIUM 134 (L) 2025 09:58 AM    SODIUM 135 2024 06:06 AM    K 2.9 (L) 2025 09:58 AM    K 4 2024 06:06 AM    BUN 9 2025 09:58 AM    BUN 20  08/09/2024 06:06 AM    CREATININE 0.62 03/17/2025 09:58 AM    CREATININE 0.69 08/09/2024 06:06 AM    GLUC 95 03/17/2025 09:58 AM    GLUC 97 08/09/2024 06:06 AM    CALCIUM 11.1 (H) 03/17/2025 09:58 AM    CALCIUM 9.4 08/09/2024 06:06 AM    AST 28 03/16/2025 10:02 AM    AST 31 08/09/2024 06:06 AM    ALT 16 03/16/2025 10:02 AM    ALT 22 08/09/2024 06:06 AM    ALB 3.3 (L) 03/16/2025 10:02 AM    ALB 4 08/09/2024 06:06 AM    ALB 4.3 12/19/2022 03:22 PM    TP 7.0 03/16/2025 10:02 AM    TP 7.6 08/09/2024 06:06 AM    EGFR 100 03/17/2025 09:58 AM    EGFR 100 08/09/2024 06:06 AM    EGFR >90 01/27/2023 03:35 PM    EGFR >60.0 03/20/2020 03:21 PM     Lab Results   Component Value Date/Time    HGB 16.2 03/17/2025 09:58 AM    WBC 13.92 (H) 03/17/2025 09:58 AM     03/17/2025 09:58 AM    INR 0.99 05/18/2024 01:39 PM    PTT 29 05/18/2024 01:39 PM     Lab Results   Component Value Date/Time    APZ4JLMUOUUM 4.321 03/16/2025 12:45 PM       Imaging Results Review: I reviewed radiology reports from this admission including: chest xray.  Other Study Results Review: EKG was reviewed.  Other studies reviewed include: ECHO    Administrative Statements   I have spent a total time of 60 minutes in caring for this patient on the day of the visit/encounter including Prognosis, Risks and benefits of tx options, Instructions for management, Patient and family education, Importance of tx compliance, Risk factor reductions, Counseling / Coordination of care, Documenting in the medical record, Reviewing/placing orders in the medical record (including tests, medications, and/or procedures), Obtaining or reviewing history  , and Communicating with other healthcare professionals . Case discussed with KALEY VANG, RN, .

## 2025-03-17 NOTE — WOUND OSTOMY CARE
Consult Note - Wound   Tommie Ramirez 69 y.o. male MRN: [id number]  Unit/Bed#: -01 Encounter: [id number]      History and Present Illness:  Presented to [facility] ED via EMS due to report of frequent falls the past 2 days.   PMH:Severe COPD CHF Chronic respiratory failure with hypoxia hyponatremia,ambulatory dysfunction. Dysphagia, palliative care consult      Assessment and findings   Seated out of bed on bedside commode. PT is one to one supervision with family member present assisting with care. Able to stand from commode with assist of 2 and use of walker.Lucas catheter, is continent of stool.Modified consistency diet with thin liquids.  1)POA Bilateral posterior upper thighs, at fold between buttocks and thighs, evolving DTI. Partial thickness skin loss mix of beefy red and non blanchable purple tissue. DTI has potential to evolve to a stage 3 4 or unstageable wounds. Pt sleeps in a recliner with upper body leaning forward and rested on a table.   2)POA Sacrum stage 1 pressure injury mix of moisture pressure , foam dressing applied    Skin care plans:  1-Cleanse  skin folds of both upper posterior thighs with soap and water. Apply Triad Paste to wound then cover with Silicone Bordered Foam Dressing (Mepilex). Scott with T for Treatment and change daily or PRN soilage/displacement.  2-Hydraguard to bilateral heel BID and PRN  3-Elevate heels to offload pressure  4-Pt has his own ROHO inflatable cushion when out of bed.  5-Turn/repoisiton q2h or when medically stable for pressure re-distribution on skin.  6-Moisturize skin daily with skin nourishing cream   7-Mid Sacro-Buttocks Wound: Cleanse sacro-buttocks with soap and water. Apply a Silicone Bordered Foam Dressing(Mepilex) to area. Scott with T for Treatment. Change every other day or PRN. Peel back and inspect skin at least Q-shift .  8-P500 LowAir Loss  9-Place Patient on ATR Turning and repositioning system (on)        Wound 03/17/25 Pressure Injury  Thigh Upper;Right;Posterior (Active)   Wound Image   03/17/25 1350   Wound Description Beefy red;Non-blanchable erythema;Light purple;Tan 03/17/25 1350   Pressure Injury Stage DTPI 03/17/25 1350   Wound Length (cm) 2 cm 03/17/25 1350   Wound Width (cm) 4 cm 03/17/25 1350   Wound Depth (cm) 0.2 cm 03/17/25 1350   Wound Surface Area (cm^2) 8 cm^2 03/17/25 1350   Wound Volume (cm^3) 1.6 cm^3 03/17/25 1350   Calculated Wound Volume (cm^3) 1.6 cm^3 03/17/25 1350   Conchis-wound Assessment Purple 03/17/25 1350   Treatments Site care 03/17/25 1350   Dressing Foam, Silicon (eg. Allevyn, etc) 03/17/25 1350   Dressing Changed New 03/17/25 1350   Patient Tolerance Tolerated well 03/17/25 1350   Dressing Status Clean;Intact;Dry;Other (Comment) 03/17/25 1350       Wound 03/17/25 Pressure Injury Thigh Upper;Posterior;Left;Lateral (Active)   Wound Image   03/17/25 1352   Wound Description Beefy red;Fragile;Light purple 03/17/25 1352   Pressure Injury Stage DTPI 03/17/25 1352   Wound Length (cm) 6 cm 03/17/25 1352   Wound Width (cm) 3 cm 03/17/25 1352   Wound Depth (cm) 0.1 cm 03/17/25 1352   Wound Surface Area (cm^2) 18 cm^2 03/17/25 1352   Wound Volume (cm^3) 1.8 cm^3 03/17/25 1352   Calculated Wound Volume (cm^3) 1.8 cm^3 03/17/25 1352   Drainage Amount None 03/17/25 1352   Treatments Site care 03/17/25 1352   Dressing Foam, Silicon (eg. Allevyn, etc) 03/17/25 1352   Dressing Changed New 03/17/25 1352   Patient Tolerance Tolerated well 03/17/25 1352   Dressing Status Clean;Dry;Intact 03/17/25 1352                 Wound 03/16/25 Pressure Injury Coccyx (Active)   Wound Description Beefy red 03/17/25 1352   Pressure Injury Stage 1 03/17/25 1352   Non-staged Wound Description Partial thickness 03/17/25 1352   Wound Length (cm) 4 cm 03/17/25 1352   Wound Width (cm) 0.5 cm 03/17/25 1352   Wound Depth (cm) 0.1 cm 03/17/25 1352   Wound Surface Area (cm^2) 2 cm^2 03/17/25 1352   Wound Volume (cm^3) 0.2 cm^3 03/17/25 1352   Calculated Wound  Volume (cm^3) 0.2 cm^3 03/17/25 1352   Drainage Amount None 03/17/25 1352   Treatments Site care 03/17/25 1352   Dressing Foam, Silicon (eg. Allevyn, etc) 03/17/25 1352   Dressing Changed New 03/17/25 1352   Patient Tolerance Tolerated well 03/17/25 1352   Dressing Status Clean;Dry;Intact 03/17/25 1352       Call or Secure Chat with any questions  Wound Care will continue to follow weekly    Madison STEVEN RN CWON

## 2025-03-17 NOTE — SPEECH THERAPY NOTE
Speech Language/Pathology  Speech-Language Pathology Bedside Swallow Evaluation      Patient Name: Tommie Ramirez    Today's Date: 3/17/2025     Summary   Consult received for bedside swallow assessment. Pt admitted from home s/p fall w/ complaints of SOB, chest pain, dysphagia. PMHx includes COPD, HTN, chronic respiratory failure, CHF, ambulatory dysfunction. Pt was on hospice at home, revoked to receive tx at hospital.   Pts wife, 1:1 sitter, and RN present for assessment. Pt sitting upright in recliner chair, on 3L O2. Voice is a whisper but pt did demo ability to cough w/ glottal closure and raise voice. Pt in head down position, reports this is due to his spine. C/o difficulty swallowing for the last month and has not been eating any food/taking meds. Drinks boost w/ milk and protein powder. Pt reports no difficulty swallowing this. Does admit to coughing on water via straw.   Currently on a puree diet. Declined all puree trials. Reports he had thrush which caused dysphagia, feels like he may still have it in his throat. Lingual surface clear of thrush that SLP could see. Communicated to PA.  Has declined all meds since admission. Witnessed w/ extensive coughing on thins via straw.  SLP provided education that since boost is thicker, pt likely protecting airway better than with water. Educated on risks of aspiration and resultant PNA. Suggested plan of care w/ VBS to further assess pharyngeal phase of swallow, however doubtful will be able to view airway d/t significant kyphosis.  Pt reported he does not want to talk about swallowing at this time and would only like an enema  Essentially plan of care for SLP dependent on pt's overall plan of care- recommend f/u w/ palliative as if pt decides to continue hospice services then VBS and thickened liquids would be deferred at this time.     Risk/s for Aspiration: Moderate-high     Recommended Diet:  Pt taking only his boost from home supplied by his wife.  Declined to trial NTL at this time therefore continue thins w/ pt knowing risks    Recommended Form of Meds: crushed with puree if pt will trial  Aspiration precautions and swallowing strategies: upright posture  Other Recommendations: Continue frequent oral care        Current Medical Status    Tommie Ramirez is a 69 y.o. male with a PMH of hyperlipidemia, HTN, hypothyroidism, pulmonary emphysema, heart failure who presents with multiple falls and chest pain that occurred after falls. Patient states that he had multiple falls over the last few nights requiring assistance for getting up. Said that he did hit his head but did not lose consciousness. Said that he currently has pain all over but mostly complaining of pain right side of his back. Said that he has been feeling more SOB than normal as well. Chronically feels SOB and is unable to lay flat at baseline, but said it has been getting worse. Has been having increasing leg swelling too. Did recently stop taking his lasix because he felt it was not working. No fever, chills, nausea, vomiting, diarrhea, constipation, abdominal pain. Of note, states that he has been having difficulty with swallowing, does very mashed up food at home. Also states that he was on hospice prior to coming into the hospital.      Current Precautions:   Fall  Aspiration      Allergies:  No known food allergies    Past medical history:  Please see H&P for details    Special Studies:  CXR  Chronic left lower lung atelectasis  No new focal consolidation, pleural effusion, or pneumothorax.     Social/Education/Vocational Hx:  Pt lives with family    Swallow Information   Current Risks for Dysphagia & Aspiration: known history of dysphagia  Current Symptoms/Concerns: coughing with po  Current Diet: puree/level 1 diet and thin liquids   Baseline Diet: thin liquids (mostly boost)      Baseline Assessment   Behavior/Cognition: decreased attention  Speech/Language Status: limited verbal  output  Patient Positioning: upright in recliner  Pain Status/Interventions/Response to Interventions:   No report of or nonverbal indications of pain.       Swallow Mechanism Exam  Facial: symmetrical  Labial: unable to test 2/2 limited command following  Lingual: unable to test 2/2 limited command following  Velum: symmetrical  Mandible: adequate ROM  Dentition: adequate  Vocal quality:breathy   Volitional Cough: strong/productive   Respiratory Status: on 3L      Consistencies Assessed and Performance   Consistencies Administered: thin liquids    Oral Stage:  difficult to fully assess d/t limited trials  Anterior loss of thins via straw. Suspected reduced oral control w/ posterior spillage    Pharyngeal Stage: suspected  Swallow Mechanics:  Swallowing initiation appeared delayed.    +cough following thins via straw    Esophageal Concerns: globus sensation w/ foods    Summary and Recommendations (see above)    Results Reviewed with: patient, RN, PA, and family     Treatment Recommended: Yes, dependent on plan of care    Frequency of treatment: 3-5x/week    Dysphagia LTG  -Patient will demonstrate safe and effective oral intake (without overt s/s significant oral/pharyngeal dysphagia including s/s penetration or aspiration) for the highest appropriate diet level.     Short Term Goals:  -Pt will tolerate Dysphagia 1/pureed diet and NTL vs thin with no significant s/s oral or pharyngeal dysphagia across 1-3 diagnostic session/s    -Patient will comply with a Video/Modified Barium Swallow study for more complete assessment of swallowing anatomy/physiology/aspiration risk and to assess efficacy of treatment techniques so as to best guide treatment plan        Speech Therapy Prognosis   Prognosis: poor    Prognosis Considerations: medical status and respiratory status    Lexie Muñoz MS CCC-SLP  3/17/2025

## 2025-03-17 NOTE — NURSING NOTE
Patient refusing PO medications as well as Voltaren and inhalers. He states he refuses to take PO medications at home.     Patient is more lethargic this morning and unable to assess orientation questions now further subjective assessment.

## 2025-03-17 NOTE — PHYSICAL THERAPY NOTE
PHYSICAL THERAPY TREATMENT NOTE  NAME:  Tommie Ramirez  DATE: 03/17/25 03/17/25 4332   Note Type   Note type Cancelled Session   Cancel Reasons Other   Additional Comments possible hospice referral         PT order received, chart review completed. Pt admitted to [facility] on 3/16/2025 w/ dx of Acute on chronic diastolic (congestive) heart failure (HCC). Attempted to see pt for PT session this date, however pt previously on hospice prior to admission, revoked to access services. Palliative consult pending with possible hospice referral. Will continue to follow pt and provide care as pt is appropriate and available.    Kandi Crow, PT,DPT

## 2025-03-17 NOTE — NURSING NOTE
Patient assisted back to bed per request. Does not transfer well, needs maximum assistance back to bed from recliner. Upon getting patient situated in bed, patient became agitated, pulling at perez catheter, telemetry monitor wires and IV tubing. Attempting multiple times to get up despite redirection. Currently not answering any orientation questions, continues to yell at staff to get him up. Positioned to sitting position in bed with multiple pillows however patient continues to be agitated, confused and attempting to get OOB by self. Tristin BURRELL at bedside for orders as well as multiple floor staff and supervisor for control team due to agitation and noncompliance. 1:1 sitter initiated by Ruth ADAMES PCA.

## 2025-03-17 NOTE — ASSESSMENT & PLAN NOTE
"States that he has difficulty with swallowing secondary to being \"hunched over\" all the time and also said that he had thrush recently.   Said he only eats very soft foods at home. Will place on pureed diet and adjust per speech recs.   Still having dysphagia, he was having coughing when swallowing water today, HR increased to 160s sustained on telemetry, gave 1 dose lopressor and HR improved.   Appreciate speech eval.   "

## 2025-03-17 NOTE — PROGRESS NOTES
Progress Note - Hospitalist   Name: Tommie Ramirez 69 y.o. male I MRN: [id number]  Unit/Bed#: -01 I Date of Admission: 3/16/2025   Date of Service: 3/17/2025 I Hospital Day: 1    Assessment & Plan  Acute on chronic diastolic (congestive) heart failure (HCC)  Wt Readings from Last 3 Encounters:   03/17/25 90.7 kg (200 lb)   05/18/24 118 kg (261 lb)   03/31/24 121 kg (267 lb 9.6 oz)     Presents to ED with frequent falls, chest pain from fall, increased SOB, increased leg swelling  ED diuresed with lasix 20 mg IV x 2.   Takes lasix 80 mg BID, spironolactone 100 mg qd for diuresis as outpatient however states that he hasn't been taking these recently.   CXR Chronic left lower lung atelectasis. No new focal consolidation, pleural effusion, or pneumothorax.  BNP normal  EKG NSR  ECHO 8/2024: There is mild concentric hypertrophy. Systolic function is normal with an ejection fraction of 55-60%. Wall   motion is within normal limits.   Repeat ECHO ordered  Continue diuresis with lasix gtt at this time  Daily weights, Strict I & Os  Cardiac diet, low sodium <2g, fluid restriction <1500  Consider cardiology consult pending [facility] discussions.   Hyponatremia  Presents to ED with fall from home, pain all over, stopped taking his lasix  Sodium on admission 126  hypervolemic on initial exam   Urine sodium, urine creatinine, urine osmo, and serum osmo ordered  TSH normal   Fluid restriction   Hold IVF, start on lasix and monitor sodium closely  Monitor BMP q12h  Sodium corrected appropriately at this time.     Lab Results   Component Value Date    SODIUM 134 (L) 03/17/2025    SODIUM 129 (L) 03/16/2025    SODIUM 126 (L) 03/16/2025     Ambulatory dysfunction  POA with complaints of frequent falls at home, pain in the chest from falling and hitting ribs. States that he has had multiple falls in the last 2 days. States that he did have headstrike but not LOC. Has been having difficulty getting up after falls and requiring  "assistance.   PT/OT eval  Fall precautions  Goals of care, counseling/discussion  Patient states that he was on hospice outpatient, patient stating that he would like treatment at this time and revoking hospice. He would like to be full code and talk with palliative care.   Palliative care consult placed  Dysphagia  States that he has difficulty with swallowing secondary to being \"hunched over\" all the time and also said that he had thrush recently.   Said he only eats very soft foods at home. Will place on pureed diet and adjust per speech recs.   Still having dysphagia, he was having coughing when swallowing water today, HR increased to 160s sustained on telemetry, gave 1 dose lopressor and HR improved.   Appreciate speech eval.   COPD, severe (HCC)  Not in exacerbation continue home regimen  Recommend following with his outpatient Pulmonologist if patient still follows, states he was on hospice outpatient. Further GOC discussions to be had.   Essential hypertension  History of htn on benicar 5 mg qd, lasix 80 mg bid, spironolactone 100 mg qd  BP acceptable in the ED, will hold spironolactone, continue with lasix at this time per CHF guidelines, hold benicar  Avoid hypotension  Chronic respiratory failure with hypoxia (HCC)  History of chronic respiratory failure and severe copd. Chronically on 3L NC, currently on baseline o2 requirements  CXR: Chronic left lower lung atelectasis. No new focal consolidation, pleural effusion, or pneumothorax.  COVID/Flu/RSV negative  Does have leukocytosis of 12.36 on admission, procal negative. No evidence of infection  BC ordered  Continue to monitor o2 saturations for goal saturation >89%.   Tachycardia  Patient with tachycardia on telemetry with HR up to 160s sustained after drinking water, was coughing and having difficulty with clearing secretion.   Appeared to have sinus tachycardia - on telemetry. Gave 1 dose of lopressor 5 and HR improved back down to 90s.   Lopressor " prn  Monitor on telemetry at this time.     VTE Pharmacologic Prophylaxis: VTE Score: 5 High Risk (Score >/= 5) - Pharmacological DVT Prophylaxis Ordered: enoxaparin (Lovenox). Sequential Compression Devices Ordered.    Mobility:   Basic Mobility Inpatient Raw Score: 8  -[facility] Goal: 3: Sit at edge of bed  JH-HLM Achieved: 2: Bed activities/Dependent transfer  JH-HLM Goal NOT achieved. Continue with multidisciplinary rounding and encourage appropriate mobility to improve upon -HLM goals.    Patient Centered Rounds: I performed bedside rounds with nursing staff today.   Discussions with Specialists or Other Care Team Provider: nursing, case management, palliative care    Education and Discussions with Family / Patient: Updated  (wife) at bedside.    Current Length of Stay: 1 day(s)  Current Patient Status: Inpatient   Certification Statement: The patient will continue to require additional inpatient hospital stay due to chf exacerbation, goals of care discussion  Discharge Plan: Anticipate discharge in >72 hrs to discharge location to be determined pending rehab evaluations.    Code Status: Level 1 - Full Code    Subjective   Seen and examined today, said that he still doesn't feel great. He said his throat and mouth are very dry. No chest pain. Still feeling short of breath. Still with leg swelling. Said he still has pain in the neck and buttocks. With cough he has difficulty clearing. Wants to talk more with palliative care regarding hospice.     Objective :  Temp:  [97.5 °F (36.4 °C)-98 °F (36.7 °C)] 97.5 °F (36.4 °C)  HR:  [] 114  BP: ()/(51-74) 94/65  Resp:  [17-20] 18  SpO2:  [93 %-100 %] 99 %  O2 Device: Nasal cannula  Nasal Cannula O2 Flow Rate (L/min):  [3 L/min] 3 L/min    Body mass index is 32.28 kg/m².     Input and Output Summary (last 24 hours):     Intake/Output Summary (Last 24 hours) at 3/17/2025 1146  Last data filed at 3/17/2025 0916  Gross per 24 hour   Intake 160 ml    Output 7100 ml   Net -6940 ml       Physical Exam  Vitals reviewed.   Constitutional:       General: He is not in acute distress.     Appearance: He is obese. He is ill-appearing. He is not toxic-appearing.   HENT:      Head: Normocephalic and atraumatic.      Mouth/Throat:      Mouth: Mucous membranes are dry.   Cardiovascular:      Rate and Rhythm: Regular rhythm. Tachycardia present.      Heart sounds: Murmur heard.   Pulmonary:      Effort: No respiratory distress.      Breath sounds: No stridor. No wheezing, rhonchi or rales.      Comments: Very decreased breath sounds, saturating well on 3L NC  Abdominal:      General: Bowel sounds are normal. There is no distension.      Palpations: Abdomen is soft. There is no mass.      Tenderness: There is no abdominal tenderness.   Genitourinary:     Comments: Catheter in place with clear yellow urine output  Musculoskeletal:      Right lower leg: Edema present.      Left lower leg: Edema present.   Skin:     General: Skin is warm and dry.   Neurological:      Mental Status: He is alert and oriented to person, place, and time.      Comments: Currently answering all orientation questions appropriately   Psychiatric:         Mood and Affect: Mood normal.         Behavior: Behavior normal.      Comments: Cooperative at this time, awake and answering questions appropriately           Lines/Drains:  Lines/Drains/Airways       Active Status       Name Placement date Placement time Site Days    Urethral Catheter 18 Fr. 03/16/25  1318  --  less than 1                  Urinary Catheter:  Goal for removal: Remove after 48 hrs of I/O monitoring           Telemetry:  Telemetry Orders (From admission, onward)               24 Hour Telemetry Monitoring  Continuous x 24 Hours (Telem)        Expiring   Question:  Reason for 24 Hour Telemetry  Answer:  Decompensated CHF- and any one of the following: continuous diuretic infusion or total diuretic dose >200 mg daily, associated  electrolyte derangement (I.e. K < 3.0), inotropic drip (continuous infusion), hx of ventricular arrhythmia, or new EF < 35%                     Telemetry Reviewed: Normal Sinus Rhythm and Sinus Tachycardia  Indication for Continued Telemetry Use: Acute CHF on >200 mg lasix/day or equivalent dose or with new reduced EF.                Lab Results: I have reviewed the following results:   Results from last 7 days   Lab Units 03/17/25  0958 03/16/25  1002   WBC Thousand/uL 13.92* 12.36*   HEMOGLOBIN g/dL 16.2 14.2   HEMATOCRIT % 51.2* 43.8   PLATELETS Thousands/uL 250 251   SEGS PCT %  --  79*   LYMPHO PCT %  --  6*   MONO PCT %  --  15*   EOS PCT %  --  0     Results from last 7 days   Lab Units 03/17/25  0958 03/16/25  1824 03/16/25  1002   SODIUM mmol/L 134*   < > 126*   POTASSIUM mmol/L 2.9*   < > 3.9   CHLORIDE mmol/L 78*   < > 79*   CO2 mmol/L 45*   < > 35*   BUN mg/dL 9   < > 10   CREATININE mg/dL 0.62   < > 0.59*   ANION GAP mmol/L 11   < > 12   CALCIUM mg/dL 11.1*   < > 11.3*   ALBUMIN g/dL  --   --  3.3*   TOTAL BILIRUBIN mg/dL  --   --  0.68   ALK PHOS U/L  --   --  67   ALT U/L  --   --  16   AST U/L  --   --  28   GLUCOSE RANDOM mg/dL 95   < > 96    < > = values in this interval not displayed.             Results from last 7 days   Lab Units 03/16/25  1002   HEMOGLOBIN A1C % 5.2     Results from last 7 days   Lab Units 03/16/25  1002   LACTIC ACID mmol/L 1.5   PROCALCITONIN ng/ml 0.06       Recent Cultures (last 7 days):   Results from last 7 days   Lab Units 03/16/25  1002   BLOOD CULTURE  Received in Microbiology Lab. Culture in Progress.  Received in Microbiology Lab. Culture in Progress.       Imaging Results Review: I reviewed radiology reports from this admission including: chest xray and Echocardiogram.  Other Study Results Review: No additional pertinent studies reviewed.    Last 24 Hours Medication List:     Current Facility-Administered Medications:     acetaminophen (TYLENOL) tablet 650 mg, Q4H  PRN    aspirin chewable tablet 81 mg, Daily    cyanocobalamin (VITAMIN B-12) tablet 500 mcg, Every Other Day    Diclofenac Sodium (VOLTAREN) 1 % topical gel 2 g, 4x Daily    enoxaparin (LOVENOX) subcutaneous injection 40 mg, Daily    Fluticasone Furoate-Vilanterol 100-25 mcg/actuation 1 puff, Daily **AND** umeclidinium 62.5 mcg/actuation inhaler AEPB 1 puff, Daily    furosemide (LASIX) 500 mg infusion 50 mL, Continuous, Last Rate: 10 mg/hr (03/16/25 1650)    levothyroxine tablet 25 mcg, Early Morning    magnesium sulfate 2 g/50 mL IVPB (premix) 2 g, Once    metoprolol (LOPRESSOR) injection 5 mg, Q6H PRN    nicotine (NICODERM CQ) 14 mg/24hr TD 24 hr patch 14 mg, Daily    pantoprazole (PROTONIX) EC tablet 40 mg, BID AC    potassium chloride 20 mEq IVPB (premix), Once **FOLLOWED BY** potassium chloride 20 mEq IVPB (premix), Once    Administrative Statements   Today, Patient Was Seen By: Mamie Lara PA-C    **Please Note: This note may have been constructed using a voice recognition system.**

## 2025-03-17 NOTE — OCCUPATIONAL THERAPY NOTE
Occupational Therapy Cancel Note      Patient Name: Tommie Ramirez  Today's Date: 3/17/2025         03/17/25 1050   Note Type   Note type Evaluation;Cancelled Session   Cancel Reasons Other       OT order received, chart review completed. Pt admitted to [facility] on 3/16 w/ Dx: Acute on chronic diastolic CHF. Spoke with RNBessie. Per chart pt was previously on hospice which had been revoked when admitted to hospital. Pt is currently pending palliative consult/goals of care discussion. Will hold OT until pt palliative consult/goals of care discussion has been completed, and a definitive code status has been determined.       Lew Graf, MS, OTR/L

## 2025-03-17 NOTE — PLAN OF CARE
Problem: Potential for Falls  Goal: Patient will remain free of falls  Description: INTERVENTIONS:  - Educate patient/family on patient safety including physical limitations  - Instruct patient to call for assistance with activity   - Consult OT/PT to assist with strengthening/mobility   - Keep Call bell within reach  - Keep bed low and locked with side rails adjusted as appropriate  - Keep care items and personal belongings within reach  - Initiate and maintain comfort rounds  - Make Fall Risk Sign visible to staff  - Apply yellow socks and bracelet for high fall risk patients  - Consider moving patient to room near nurses station  Outcome: Progressing     Problem: PAIN - ADULT  Goal: Verbalizes/displays adequate comfort level or baseline comfort level  Description: Interventions:  - Encourage patient to monitor pain and request assistance  - Assess pain using appropriate pain scale  - Administer analgesics based on type and severity of pain and evaluate response  - Implement non-pharmacological measures as appropriate and evaluate response  - Consider cultural and social influences on pain and pain management  - Notify physician/advanced practitioner if interventions unsuccessful or patient reports new pain  Outcome: Progressing     Problem: SAFETY ADULT  Goal: Patient will remain free of falls  Description: INTERVENTIONS:  - Educate patient/family on patient safety including physical limitations  - Instruct patient to call for assistance with activity   - Consult OT/PT to assist with strengthening/mobility   - Keep Call bell within reach  - Keep bed low and locked with side rails adjusted as appropriate  - Keep care items and personal belongings within reach  - Initiate and maintain comfort rounds  - Make Fall Risk Sign visible to staff  - Apply yellow socks and bracelet for high fall risk patients  - Consider moving patient to room near nurses station  Outcome: Progressing     Problem: Prexisting or High  Potential for Compromised Skin Integrity  Goal: Skin integrity is maintained or improved  Description: INTERVENTIONS:  - Identify patients at risk for skin breakdown  - Assess and monitor skin integrity  - Assess and monitor nutrition and hydration status  - Monitor labs   - Assess for incontinence   - Turn and reposition patient  - Assist with mobility/ambulation  - Relieve pressure over bony prominences  - Avoid friction and shearing  - Provide appropriate hygiene as needed including keeping skin clean and dry  - Evaluate need for skin moisturizer/barrier cream  - Collaborate with interdisciplinary team   - Patient/family teaching  - Consider wound care consult   Outcome: Not Progressing     Problem: SAFETY ADULT  Goal: Maintain or return to baseline ADL function  Description: INTERVENTIONS:  -  Assess patient's ability to carry out ADLs; assess patient's baseline for ADL function and identify physical deficits which impact ability to perform ADLs (bathing, care of mouth/teeth, toileting, grooming, dressing, etc.)  - Assess/evaluate cause of self-care deficits   - Assess range of motion  - Assess patient's mobility; develop plan if impaired  - Assess patient's need for assistive devices and provide as appropriate  - Encourage maximum independence but intervene and supervise when necessary  - Involve family in performance of ADLs  - Assess for home care needs following discharge   - Consider OT consult to assist with ADL evaluation and planning for discharge  - Provide patient education as appropriate  Outcome: Not Progressing

## 2025-03-17 NOTE — ASSESSMENT & PLAN NOTE
Wt Readings from Last 3 Encounters:   03/17/25 90.7 kg (200 lb)   05/18/24 118 kg (261 lb)   03/31/24 121 kg (267 lb 9.6 oz)     Presents to ED with frequent falls, chest pain from fall, increased SOB, increased leg swelling  ED diuresed with lasix 20 mg IV x 2.   Takes lasix 80 mg BID, spironolactone 100 mg qd for diuresis as outpatient however states that he hasn't been taking these recently.   CXR Chronic left lower lung atelectasis. No new focal consolidation, pleural effusion, or pneumothorax.  BNP normal  EKG NSR  ECHO 8/2024: There is mild concentric hypertrophy. Systolic function is normal with an ejection fraction of 55-60%. Wall   motion is within normal limits.   Repeat ECHO ordered  Continue diuresis with lasix gtt at this time  Daily weights, Strict I & Os  Cardiac diet, low sodium <2g, fluid restriction <1500  Consider cardiology consult pending GOC discussions.

## 2025-03-17 NOTE — ASSESSMENT & PLAN NOTE
Decisional apparatus: Patient has questionable capacity on exam today.  If capacity is lost, patient's substitute decision maker would default to spouse by [place].  Advance Directive / Living Will / POLST / POA Forms: None on file    Goals  Level 1 code status.   Full code.   Disease focused care.   No limits placed.   Goals unclear d/t limited/interrupted discussions over several video visit attempts  Pt and spouse are considering returning home on hospice with [facility]  Continue current medical management and work up  Pt says that he wants to go home  Reviewed CODE status, no answer provided. Continue full code.   Further GOC discussions needed.

## 2025-03-17 NOTE — ASSESSMENT & PLAN NOTE
Wt Readings from Last 3 Encounters:   03/17/25 90.7 kg (200 lb)   05/18/24 118 kg (261 lb)   03/31/24 121 kg (267 lb 9.6 oz)   Mgmt per primary team

## 2025-03-17 NOTE — ASSESSMENT & PLAN NOTE
Patient with tachycardia on telemetry with HR up to 160s sustained after drinking water, was coughing and having difficulty with clearing secretion.   Appeared to have sinus tachycardia - on telemetry. Gave 1 dose of lopressor 5 and HR improved back down to 90s.   Lopressor prn  Monitor on telemetry at this time.

## 2025-03-17 NOTE — CASE MANAGEMENT
Case Management Assessment & Discharge Planning Note    Patient name Tommie Ramirez  Location /-01 MRN [id number]  : 1955 Date 3/17/2025       Current Admission Date: 3/16/2025  Current Admission Diagnosis:Acute on chronic diastolic (congestive) heart failure (HCC)   Patient Active Problem List    Diagnosis Date Noted Date Diagnosed    Hyponatremia 2025     Ambulatory dysfunction 2025     Goals of care, counseling/discussion 2025     Dysphagia 2025     Cellulitis of left lower extremity 2024     Acute on chronic diastolic (congestive) heart failure (HCC) 2023     Elevated lipase 2023     Chronic respiratory failure with hypoxia (HCC) 2023     Acute on chronic respiratory failure (HCC) 2022     Acute diastolic (congestive) heart failure (HCC) 2022     Morbid obesity with BMI of 40.0-44.9, adult (HCC) 2022     COPD, severe ([facility])      Essential hypertension      Tobacco abuse        LOS (days): 1  Geometric Mean LOS (GMLOS) (days):   Days to GMLOS:     OBJECTIVE:    Risk of Unplanned Readmission Score: 12.37         Current admission status: Inpatient       Preferred Pharmacy:   [facility] [address] [facility]  [address] [facility] [address]  Phone: [phone] Fax: [phone]    [facility]/pharmacy #[id number] [place], [place] - [address] [facility]  [address] [place] [address]  Phone: [phone] Fax: [phone]    Primary Care Provider: Jair Morton DO    Primary Insurance: MEDICARE  Secondary Insurance: AARP    ASSESSMENT:  Active Health Care Proxies    There are no active Health Care Proxies on file.       Advance Directives  Does patient have a Health Care POA?: No  Was patient offered paperwork?: Yes (declined)  Does patient have Advance Directives?: No  Was patient offered paperwork?: Yes (declined)  Primary Contact: Dyan James- spouse          Readmission Root Cause  30 Day Readmission: No    Patient Information  Admitted from:: Home  Mental Status: Alert  During Assessment patient was accompanied by: Not accompanied during assessment  Assessment information provided by:: Spouse  Primary Caregiver: Spouse  Caregiver's Name:: Dyan Ramirez- spouse  Caregiver's Relationship to Patient:: Significant Other  Caregiver's Telephone Number:: [phone]  Support Systems: Spouse/significant other, Self, Home care staff, Daughter  County of Residence: [facility]  What city do you live in?: [place]  Home entry access options. Select all that apply.: Stairs  Number of steps to enter home.: 4  Do the steps have railings?: Yes  Type of Current Residence: [facility]  Living Arrangements: Lives w/ Spouse/significant other  Is patient a ?: No    Activities of Daily Living Prior to Admission  Functional Status: Assistance  Completes ADLs independently?: No  Level of ADL dependence: Total Dependent  Ambulates independently?: No  Level of ambulatory dependence: Total Dependent  Does patient use assisted devices?: Yes  Assisted Devices (DME) used: Bedside Commode, Home Oxygen concentrator, Portable Oxygen tanks, Walker, Straight Cane  DME Company Name (respiratory supplies): [employer]  O2 Rate(s): 3 LPM continously  Does patient currently own DME?: Yes  What DME does the patient currently own?: Bedside Commode, Straight Cane, Walker  Does patient have a history of Outpatient Therapy (PT/OT)?: No  Does the patient have a history of Short-Term Rehab?: No  Does patient have a history of HHC?: No  Does patient currently have HHC?: Yes    Current Home Health Care  Type of Current Home Care Services: Hospice  Home Health Agency Name:: Other ([facility])  Current Home Health Follow-Up Provider:: PCP    Patient Information Continued  Income Source: Pension/FCI  Does patient have prescription coverage?: Yes  Can the patient afford their medications and any  related supplies (such as glucometers or test strips)?: Yes  Does patient receive dialysis treatments?: No  Does patient have a history of substance abuse?: Yes  Historical substance use preference: Alcohol/ETOH  History of Withdrawal Symptoms: Other withdrawal symptoms (specify in comment) (Anxiety)  Is patient currently in treatment for substance abuse?: N/A - sober (3 years sober)  Does patient have a history of Mental Health Diagnosis?: No         Means of Transportation  Means of Transport to [facility]:: Family transport      Social Determinants of Health (SDOH)      Flowsheet Row Most Recent Value   Housing Stability    In the last 12 months, was there a time when you were not able to pay the mortgage or rent on time? N   In the past 12 months, how many times have you moved where you were living? 0   At any time in the past 12 months, were you homeless or living in a shelter (including now)? N   Transportation Needs    In the past 12 months, has lack of transportation kept you from medical appointments or from getting medications? no   In the past 12 months, has lack of transportation kept you from meetings, work, or from getting things needed for daily living? No   Food Insecurity    Within the past 12 months, you worried that your food would run out before you got the money to buy more. Never true   Within the past 12 months, the food you bought just didn't last and you didn't have money to get more. Never true   Utilities    In the past 12 months has the electric, gas, oil, or water company threatened to shut off services in your home? No            DISCHARGE DETAILS:    Discharge planning discussed with:: patient and spouse Dyan ROCHE met with pt and spoke with spouse via phone to review role of KALEY and discuss any supports needed upon discharge. Baseline information obtained pt lives at home with his spouse in a ranch home with 4 YOSVANY. Pt was currently active with [facility] for approximally the  "last month at home. Spouse indicates, pt had a fall at home and revoked hospice to come to the hospital for treatment. Pt requires moderate assistance with ADLs. Pt spouse indicates, pt requires assistance with transfers using a walker, ambulates a few steps with a walker, and mainly stays in the \"kitchen\" due to c/o neck pain. Spouse is the primary caregiver at home, she assist with meals, medications, and transportation. Pt wears oxygen at 3LPM continually, pt has oxygen concentrator and portable oxygen tanks supplied through [employer] and spouse indicates [facility] also has supplied pt with an oxygen concentrator and portable oxygen tanks at home. Spouse indicates pt has been complaining of difficulty swallowing medications and food; speech consult pending. PT/OT eval pending. Pallative care consult pending. CM will continue to follow and address discharge needs.         1212- received call from Marva at [facility] # [phone]; they are discontinuing hospice services at this time due to patient being admitted to the hospital. Per Marva, if patient wants hospice services a new referral will need to be made for services.    Freedom of Choice: Yes     CM contacted family/caregiver?: Yes             Contacts  Patient Contacts: Dyan Ramirez- spouse  Relationship to Patient:: Family  Contact Method: Phone  Phone Number: [phone]  Reason/Outcome: Discharge Planning    Requested Home Health Care         Home Health Agency Name:: Other ([facility])                                                                                     "
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
## 2025-03-17 NOTE — ASSESSMENT & PLAN NOTE
Social support  Patient is supported by spouse  Supportive listening provided  Normalized experience of patient/family  Provided anxiety containment  Provided anticipatory guidance  Investigated spiritual needs - discussed with     Follow up  Palliative Care will continue to follow and goals of care discussions will be ongoing.    Please reach out to on-call provider via Epic Secure Chat  if questions or concerns arise.  Please do not hesitate to reach our on call provider through our clinic answering service at [phone] should you have acute symptom control concerns.

## 2025-03-17 NOTE — ASSESSMENT & PLAN NOTE
Presents to ED with fall from home, pain all over, stopped taking his lasix  Sodium on admission 126  hypervolemic on initial exam   Urine sodium, urine creatinine, urine osmo, and serum osmo ordered  TSH normal   Fluid restriction   Hold IVF, start on lasix and monitor sodium closely  Monitor BMP q12h  Sodium corrected appropriately at this time.     Lab Results   Component Value Date    SODIUM 134 (L) 03/17/2025    SODIUM 129 (L) 03/16/2025    SODIUM 126 (L) 03/16/2025

## 2025-03-17 NOTE — DISCHARGE INSTR - OTHER ORDERS
Skin care plans:  1-Cleanse  skin folds of both upper posterior thighs with soap and water. Apply Triad Paste to wound then cover with Silicone Bordered Foam Dressing (Mepilex). Scott with T for Treatment and change daily or PRN soilage/displacement.  2-Hydraguard to bilateral heel BID and PRN  3-Elevate heels to offload pressure  4-Pt has his own ROHO inflatable cushion when out of bed.  5-Turn/repoisiton q2h or when medically stable for pressure re-distribution on skin.  6-Moisturize skin daily with skin nourishing cream   7-Mid Sacro-Buttocks Wound: Cleanse sacro-buttocks with soap and water. Apply a Silicone Bordered Foam Dressing(Mepilex) to area. Scott with T for Treatment. Change every other day or PRN. Peel back and inspect skin at least Q-shift .  8-P500 LowAir Loss  9-Place Patient on ATR Turning and repositioning system (on)

## 2025-03-18 VITALS
HEART RATE: 63 BPM | RESPIRATION RATE: 18 BRPM | SYSTOLIC BLOOD PRESSURE: 108 MMHG | WEIGHT: 198.85 LBS | DIASTOLIC BLOOD PRESSURE: 59 MMHG | HEIGHT: 66 IN | TEMPERATURE: 97.3 F | BODY MASS INDEX: 31.96 KG/M2 | OXYGEN SATURATION: 98 %

## 2025-03-18 PROBLEM — E43 SEVERE PROTEIN-CALORIE MALNUTRITION (HCC): Status: ACTIVE | Noted: 2025-03-18

## 2025-03-18 LAB
BUN SERPL-MCNC: 11 MG/DL (ref 5–25)
CALCIUM SERPL-MCNC: 10.3 MG/DL (ref 8.4–10.2)
CHLORIDE SERPL-SCNC: 76 MMOL/L (ref 96–108)
CO2 SERPL-SCNC: >45 MMOL/L (ref 21–32)
CREAT SERPL-MCNC: 0.67 MG/DL (ref 0.6–1.3)
ERYTHROCYTE [DISTWIDTH] IN BLOOD BY AUTOMATED COUNT: 13.4 % (ref 11.6–15.1)
GFR SERPL CREATININE-BSD FRML MDRD: 97 ML/MIN/1.73SQ M
GLUCOSE SERPL-MCNC: 102 MG/DL (ref 65–140)
HCT VFR BLD AUTO: 44.2 % (ref 36.5–49.3)
HGB BLD-MCNC: 14.3 G/DL (ref 12–17)
MAGNESIUM SERPL-MCNC: 1.8 MG/DL (ref 1.9–2.7)
MCH RBC QN AUTO: 29.7 PG (ref 26.8–34.3)
MCHC RBC AUTO-ENTMCNC: 32.4 G/DL (ref 31.4–37.4)
MCV RBC AUTO: 92 FL (ref 82–98)
PLATELET # BLD AUTO: 272 THOUSANDS/UL (ref 149–390)
PMV BLD AUTO: 9 FL (ref 8.9–12.7)
POTASSIUM SERPL-SCNC: 2.7 MMOL/L (ref 3.5–5.3)
PROCALCITONIN SERPL-MCNC: 0.15 NG/ML
RBC # BLD AUTO: 4.82 MILLION/UL (ref 3.88–5.62)
SODIUM SERPL-SCNC: 134 MMOL/L (ref 135–147)
WBC # BLD AUTO: 15.17 THOUSAND/UL (ref 4.31–10.16)

## 2025-03-18 PROCEDURE — 83735 ASSAY OF MAGNESIUM: CPT

## 2025-03-18 PROCEDURE — 85027 COMPLETE CBC AUTOMATED: CPT

## 2025-03-18 PROCEDURE — 84145 PROCALCITONIN (PCT): CPT

## 2025-03-18 PROCEDURE — 99239 HOSP IP/OBS DSCHRG MGMT >30: CPT

## 2025-03-18 PROCEDURE — 80048 BASIC METABOLIC PNL TOTAL CA: CPT

## 2025-03-18 RX ORDER — POTASSIUM CHLORIDE 14.9 MG/ML
20 INJECTION INTRAVENOUS ONCE
Status: COMPLETED | OUTPATIENT
Start: 2025-03-18 | End: 2025-03-18

## 2025-03-18 RX ADMIN — NICOTINE 14 MG: 14 PATCH, EXTENDED RELEASE TRANSDERMAL at 10:53

## 2025-03-18 RX ADMIN — POTASSIUM CHLORIDE 20 MEQ: 14.9 INJECTION, SOLUTION INTRAVENOUS at 10:53

## 2025-03-18 RX ADMIN — METOROPROLOL TARTRATE 5 MG: 5 INJECTION, SOLUTION INTRAVENOUS at 08:09

## 2025-03-18 RX ADMIN — POTASSIUM CHLORIDE 20 MEQ: 14.9 INJECTION, SOLUTION INTRAVENOUS at 07:46

## 2025-03-18 RX ADMIN — POTASSIUM CHLORIDE 20 MEQ: 14.9 INJECTION, SOLUTION INTRAVENOUS at 02:38

## 2025-03-18 NOTE — ASSESSMENT & PLAN NOTE
Presents to ED with fall from home, pain all over, stopped taking his lasix  Sodium on admission 126  hypervolemic on initial exam   Urine sodium, urine creatinine, urine osmo, and serum osmo ordered  TSH normal   Fluid restriction   Hold IVF, start on lasix and monitor sodium closely  Monitor BMP q12h  Sodium corrected appropriately at this time.     Lab Results   Component Value Date    SODIUM 134 (L) 03/18/2025    SODIUM 133 (L) 03/17/2025    SODIUM 134 (L) 03/17/2025

## 2025-03-18 NOTE — MALNUTRITION/BMI
"This medical record reflects one or more clinical indicators suggestive of malnutrition.    Malnutrition Findings:   Adult Malnutrition type: Chronic illness  Adult Degree of Malnutrition: Other severe protein calorie malnutrition  Malnutrition Characteristics: Inadequate energy, Weight loss                360 Statement: Chronic severe malnutrition related to inadequate oral intake, swallowing difficulty as evidenced by < 75% estimated energy intake > 1 mo, 20.3% weight loss x 7mo (8/8/24 251lb, 3/18/25 198lb). Treated with: Fluid restriction per MD, no additional diet restrictions. Offered Ensure supplementation, pt refused stating he would not be able to swallow it. Consider trialing ensure with SLP if pt is willing/appropriate. Asked if Boost were available would he drink it, pt stated \"no\" and refusing all po intake. Recommend GOC discussion. Of note, appears to be d/c home with home hospice per CM note. Continue daily weights.    BMI Findings:           Body mass index is 32.1 kg/m².     See Nutrition note dated 3/18/25 for additional details.  Completed nutrition assessment is viewable in the nutrition documentation.  "

## 2025-03-18 NOTE — NURSING NOTE
Pt discharged to home AMA. Daughter and friend at bedside to transport pt home, wife waiting in car. Pt was assisted into car by 2 RNS and family member and accompanied by security. Pt sent home with all belongings. Home O2 brought by wife.

## 2025-03-18 NOTE — PLAN OF CARE
Problem: Potential for Falls  Goal: Patient will remain free of falls  Description: INTERVENTIONS:  - Educate patient/family on patient safety including physical limitations  - Instruct patient to call for assistance with activity   - Consult OT/PT to assist with strengthening/mobility   - Keep Call bell within reach  - Keep bed low and locked with side rails adjusted as appropriate  - Keep care items and personal belongings within reach  - Initiate and maintain comfort rounds  - Make Fall Risk Sign visible to staff  - Offer Toileting every 2 Hours, in advance of need  - Initiate/Maintain bed and chair alarm  - Obtain necessary fall risk management equipment: walker   - Apply yellow socks and bracelet for high fall risk patients  - Consider moving patient to room near nurses station  Outcome: Progressing     Problem: Prexisting or High Potential for Compromised Skin Integrity  Goal: Skin integrity is maintained or improved  Description: INTERVENTIONS:  - Identify patients at risk for skin breakdown  - Assess and monitor skin integrity  - Assess and monitor nutrition and hydration status  - Monitor labs   - Assess for incontinence   - Turn and reposition patient  - Assist with mobility/ambulation  - Relieve pressure over bony prominences  - Avoid friction and shearing  - Provide appropriate hygiene as needed including keeping skin clean and dry  - Evaluate need for skin moisturizer/barrier cream  - Collaborate with interdisciplinary team   - Patient/family teaching  - Consider wound care consult   Outcome: Progressing     Problem: PAIN - ADULT  Goal: Verbalizes/displays adequate comfort level or baseline comfort level  Description: Interventions:  - Encourage patient to monitor pain and request assistance  - Assess pain using appropriate pain scale  - Administer analgesics based on type and severity of pain and evaluate response  - Implement non-pharmacological measures as appropriate and evaluate response  -  Consider cultural and social influences on pain and pain management  - Notify physician/advanced practitioner if interventions unsuccessful or patient reports new pain  Outcome: Progressing     Problem: SAFETY ADULT  Goal: Patient will remain free of falls  Description: INTERVENTIONS:  - Educate patient/family on patient safety including physical limitations  - Instruct patient to call for assistance with activity   - Consult OT/PT to assist with strengthening/mobility   - Keep Call bell within reach  - Keep bed low and locked with side rails adjusted as appropriate  - Keep care items and personal belongings within reach  - Initiate and maintain comfort rounds  - Make Fall Risk Sign visible to staff  - Offer Toileting every 2 Hours, in advance of need  - Initiate/Maintain bed and chair alarm  - Obtain necessary fall risk management equipment: walker   - Apply yellow socks and bracelet for high fall risk patients  - Consider moving patient to room near nurses station  Outcome: Progressing  Goal: Maintain or return to baseline ADL function  Description: INTERVENTIONS:  -  Assess patient's ability to carry out ADLs; assess patient's baseline for ADL function and identify physical deficits which impact ability to perform ADLs (bathing, care of mouth/teeth, toileting, grooming, dressing, etc.)  - Assess/evaluate cause of self-care deficits   - Assess range of motion  - Assess patient's mobility; develop plan if impaired  - Assess patient's need for assistive devices and provide as appropriate  - Encourage maximum independence but intervene and supervise when necessary  - Involve family in performance of ADLs  - Assess for home care needs following discharge   - Consider OT consult to assist with ADL evaluation and planning for discharge  - Provide patient education as appropriate  Outcome: Progressing  Goal: Maintains/Returns to pre admission functional level  Description: INTERVENTIONS:  - Perform AM-PAC 6 Click Basic  Mobility/ Daily Activity assessment daily.  - Set and communicate daily mobility goal to care team and patient/family/caregiver.   - Collaborate with rehabilitation services on mobility goals if consulted  - Perform Range of Motion 3 times a day.  - Reposition patient every 2 hours.  - Dangle patient 3 times a day  - Stand patient 3 times a day  - Ambulate patient 3 times a day  - Out of bed to chair 3 times a day   - Out of bed for meals 3 times a day  - Out of bed for toileting  - Record patient progress and toleration of activity level   Outcome: Progressing     Problem: Nutrition/Hydration-ADULT  Goal: Nutrient/Hydration intake appropriate for improving, restoring or maintaining nutritional needs  Description: Monitor and assess patient's nutrition/hydration status for malnutrition. Collaborate with interdisciplinary team and initiate plan and interventions as ordered.  Monitor patient's weight and dietary intake as ordered or per policy. Utilize nutrition screening tool and intervene as necessary. Determine patient's food preferences and provide high-protein, high-caloric foods as appropriate.     INTERVENTIONS:  - Monitor oral intake, urinary output, labs, and treatment plans  - Assess nutrition and hydration status and recommend course of action  - Evaluate amount of meals eaten  - Assist patient with eating if necessary   - Allow adequate time for meals  - Recommend/ encourage appropriate diets, oral nutritional supplements, and vitamin/mineral supplements  - Order, calculate, and assess calorie counts as needed  - Recommend, monitor, and adjust tube feedings and TPN/PPN based on assessed needs  - Assess need for intravenous fluids  - Provide specific nutrition/hydration education as appropriate  - Include patient/family/caregiver in decisions related to nutrition  Outcome: Progressing

## 2025-03-18 NOTE — ASSESSMENT & PLAN NOTE
Not in exacerbation continue home regimen  Recommend following with his outpatient Pulmonologist if patient still follows, states he was on hospice outpatient. Further [facility] discussions to be had.   Patient wanting to be a full code but also wanting hospice, explained to patient that on hospice patient is DNR and would like to pass peacefully due to end of life care and due to his COPD he likely would not be weaned off a ventilator. Patient states he understands but would like to be a full code and still on hospice. Would like to sign out AMA.

## 2025-03-18 NOTE — PLAN OF CARE
Problem: Potential for Falls  Goal: Patient will remain free of falls  Description: INTERVENTIONS:  - Educate patient/family on patient safety including physical limitations  - Instruct patient to call for assistance with activity   - Consult OT/PT to assist with strengthening/mobility   - Keep Call bell within reach  - Keep bed low and locked with side rails adjusted as appropriate  - Keep care items and personal belongings within reach  - Initiate and maintain comfort rounds  - Make Fall Risk Sign visible to staff  - Offer Toileting every 2 Hours, in advance of need  - Initiate/Maintain bed alarm  - Obtain necessary fall risk management equipment:   - Apply yellow socks and bracelet for high fall risk patients  - Consider moving patient to room near nurses station  3/18/2025 1121 by Jeannette Gore RN  Outcome: Progressing  3/18/2025 1119 by Jeannette Gore RN  Outcome: Progressing     Problem: Prexisting or High Potential for Compromised Skin Integrity  Goal: Skin integrity is maintained or improved  Description: INTERVENTIONS:  - Identify patients at risk for skin breakdown  - Assess and monitor skin integrity  - Assess and monitor nutrition and hydration status  - Monitor labs   - Assess for incontinence   - Turn and reposition patient  - Assist with mobility/ambulation  - Relieve pressure over bony prominences  - Avoid friction and shearing  - Provide appropriate hygiene as needed including keeping skin clean and dry  - Evaluate need for skin moisturizer/barrier cream  - Collaborate with interdisciplinary team   - Patient/family teaching  - Consider wound care consult   3/18/2025 1121 by Jeannette Gore RN  Outcome: Progressing  3/18/2025 1119 by Jeannette Gore RN  Outcome: Progressing     Problem: PAIN - ADULT  Goal: Verbalizes/displays adequate comfort level or baseline comfort level  Description: Interventions:  - Encourage patient to monitor pain and request assistance  - Assess pain using appropriate pain  scale  - Administer analgesics based on type and severity of pain and evaluate response  - Implement non-pharmacological measures as appropriate and evaluate response  - Consider cultural and social influences on pain and pain management  - Notify physician/advanced practitioner if interventions unsuccessful or patient reports new pain  3/18/2025 1121 by Jeannette Gore RN  Outcome: Progressing  3/18/2025 1119 by Jeannette Gore RN  Outcome: Progressing

## 2025-03-18 NOTE — ASSESSMENT & PLAN NOTE
History of htn on benicar 5 mg qd, lasix 80 mg bid, spironolactone 100 mg qd  BP acceptable in the ED, will hold spironolactone, continue with lasix at this time per CHF guidelines, hold benicar  Resume home medications on discharge, patient with further follow up with PCP - not clear for discharge medically at this time, patient signing out AMA.

## 2025-03-18 NOTE — NURSING NOTE
"Pt's daughter and friend present to transport patient home. Requesting to speak to \"head nurse and Irvin\". CC Kristi OLMEDO. Spoke with family and provided update of situation and events leading to pt and wife's decision to sign out AMA. Conversation witnessed by this RN. Family expressed concerns of safely transporting patient home and ability to safely get him into the home. Kristi OLMEDO explained to them that because patient is leaving AMA, transport unable to bet set up by hospital. Case management did reach out to a few EMS fire companies to see if they were able to independently assist patient with transport. Family decided they would transport pt home. Staff assisted to get pt in care safely, family state they called other family to help get pt into house.   "

## 2025-03-18 NOTE — ASSESSMENT & PLAN NOTE
History of chronic respiratory failure and severe copd. Chronically on 3L NC, currently on baseline o2 requirements  CXR: Chronic left lower lung atelectasis. No new focal consolidation, pleural effusion, or pneumothorax.  COVID/Flu/RSV negative  Does have leukocytosis of 12.36 on admission, procal negative. No evidence of infection  BC NG 24 hours.   Continue to monitor o2 saturations for goal saturation >89%.

## 2025-03-18 NOTE — CASE MANAGEMENT
Case Management Discharge Planning Note    Patient name Tommie Ramirez  Location /-01 MRN [id number]  : 1955 Date 3/18/2025       Current Admission Date: 3/16/2025  Current Admission Diagnosis:Acute on chronic diastolic (congestive) heart failure (HCC)   Patient Active Problem List    Diagnosis Date Noted Date Diagnosed    Tachycardia 2025     Palliative care by specialist 2025     Hyponatremia 2025     Ambulatory dysfunction 2025     Goals of care, counseling/discussion 2025     Dysphagia 2025     Cellulitis of left lower extremity 2024     Acute on chronic diastolic (congestive) heart failure (HCC) 2023     Elevated lipase 2023     Chronic respiratory failure with hypoxia (HCC) 2023     Acute on chronic respiratory failure (HCC) 2022     Acute diastolic (congestive) heart failure (HCC) 2022     Morbid obesity with BMI of 40.0-44.9, adult ([facility]) 2022     COPD, severe ([facility])      Essential hypertension      Tobacco abuse        LOS (days): 2  Geometric Mean LOS (GMLOS) (days): 3.9  Days to GMLOS:2.1     OBJECTIVE:  Risk of Unplanned Readmission Score: 11.3         Current admission status: Inpatient   Preferred Pharmacy:   [place], [place] - [address]  [address] [facility] [address]  Phone: [phone] Fax: [phone]    [id number] - [place], [place] - [address] [facility]  [address] [facility] [address]  Phone: [phone] Fax: [phone]    Primary Care Provider: Jair Morton DO    Primary Insurance: MEDICARE  Secondary Insurance: AAR    DISCHARGE DETAILS:    Discharge planning discussed with:: spouse Dyan Ramirez  Freedom of Choice: Yes  Comments - Freedom of Choice: requesting to take patient home with [facility] services today, referral placed in aidin.  Order for hospice uploaded to aidin.   Call placed to  [facility]; they will accept patient a full code and will work on changing him to DNR later at home.       1132- call placed to [facility] to follow up on referral placed in aidin; they don't have access to aidin. [facility] is requesting to fax clinicals to [phone]. Referral for hospice faxed to [facility].       Mamie Lara PA-C explained AMA form and pt signed. Patient requesting to go home with [facility] hospice services.  1220- patient signed out AMA, spoke with patients spouse Dyan she is aware and agreeable with AMA. Spouse will contact pts friend Bruno Reynaga to assist with transportation to home. Pts daughter in on her way here, per spouse she lives 3 hours away and will be here around 1400. Per spouse; pts friend Bruno will stop at her house to  portable oxygen tank to bring to hospital for transport home.       1519- Call placed to [facility]; spoke with Anita, she stated they received the referral and they are able to accept patient on their services for hospice. CM made [facility] aware pt is discharging to home today. Anita stated they will follow up with patient at home.   CM contacted family/caregiver?: Yes             Contacts  Patient Contacts: Dyan Ramirez- spouse  Contact Method: Phone  Phone Number: [phone]  Reason/Outcome: Discharge Planning              Other Referral/Resources/Interventions Provided:  Interventions: Hospice         Treatment Team Recommendation: Hospice, Home  Discharge Destination Plan:: Hospice, Home

## 2025-03-18 NOTE — DISCHARGE SUMMARY
Discharge Summary - Hospitalist   Name: Tommie Ramirez 69 y.o. male I MRN: [id number]  Unit/Bed#: -01 I Date of Admission: 3/16/2025   Date of Service: 3/18/2025 I Hospital Day: 2     Assessment & Plan  Acute on chronic diastolic (congestive) heart failure (HCC)  Wt Readings from Last 3 Encounters:   03/18/25 90.2 kg (198 lb 13.7 oz)   05/18/24 118 kg (261 lb)   03/31/24 121 kg (267 lb 9.6 oz)     Presents to ED with frequent falls, chest pain from fall, increased SOB, increased leg swelling  ED diuresed with lasix 20 mg IV x 2.   Takes lasix 80 mg BID, spironolactone 100 mg qd for diuresis as outpatient however states that he hasn't been taking these recently.   CXR Chronic left lower lung atelectasis. No new focal consolidation, pleural effusion, or pneumothorax.  BNP normal  EKG NSR  ECHO 8/2024: There is mild concentric hypertrophy. Systolic function is normal with an ejection fraction of 55-60%. Wall   motion is within normal limits.   Repeat echo: Diastolic function is mildly abnormal, consistent with grade I (abnormal) relaxation   Continue diuresis with lasix gtt at this time  Daily weights, Strict I & Os  Cardiac diet, low sodium <2g, fluid restriction <1500  Consider cardiology consult pending GOC discussions.   D/c lasix gtt. Patient wanting to leave AMA despite discussions regarding GOC. Patient would like to be full code, but wants to go home on hospice. Explained that medically patient still is volume overloaded and with electrolyte imbalances which puts him at risk of fatal arrhythmias and death. Patient and wife (called while in room with patient) verbalized understanding of this. Patient signing out AMA.   Hyponatremia  Presents to ED with fall from home, pain all over, stopped taking his lasix  Sodium on admission 126  hypervolemic on initial exam   Urine sodium, urine creatinine, urine osmo, and serum osmo ordered  TSH normal   Fluid restriction   Hold IVF, start on lasix and monitor  sodium closely  Monitor BMP q12h  Sodium corrected appropriately at this time.     Lab Results   Component Value Date    SODIUM 134 (L) 2025    SODIUM 133 (L) 2025    SODIUM 134 (L) 2025     Ambulatory dysfunction  POA with complaints of frequent falls at home, pain in the chest from falling and hitting ribs. States that he has had multiple falls in the last 2 days. States that he did have headstrike but not LOC. Has been having difficulty getting up after falls and requiring assistance.   PT/OT eval  Fall precautions  Goals of care, counseling/discussion  Patient states that he was on hospice outpatient, patient stating that he would like treatment at this time and revoking hospice. He would like to be full code and talk with palliative care.   Palliative care consult: goals unclear d/t limited/interrupted discussions. Patient and spouse would like patient to go back home on hospice with [facility]  Discussed GOC with patient and spouse over the phone, both patient and spouse would like patient to be Full code, discussed that full code with CPR and Intubation means that we are focusing on getting him better and medical management and hospice is focusing on patient's comfort and end of life care, not medical management. Patient endorsing that he would like to still be full code and also on hospice.   Patient answers all orientation questions appropriately. He is alert and oriented. Knew name, , month, year, location, current and previous president, and situation. Knew why he was previously on hospice. Seems ill informed on hospice and GOC however. Did attempt neuropsych eval however patient did not want to participate. On exam currently, patient does answer all orientation questions appropriately. Discussed that patient would benefit greatly from following with palliative care outpatient to discuss further goals as he still would like to be full code. [facility] also willing to  "take patient back at this time with patient being full code and discussing goals of care further.   Discussed that patient is not medically clear for discharge at this time due to CHF exacerbation. Patient verbalized that he understands that he is not medically clear at this time. Explained to patient and spouse (over the phone) that patient is still volume overloaded and with electrolyte imbalances. Explained that he is not up and moving, did not have PT/OT evaluation either. Explained that patient may have worsening of CHF exacerbation, worsening respiratory failure, pulmonary edema, fatal arrhythmias due to electrolyte imbalances leading to death. Patient and significant other verbalized understanding. Patient signed AMA form and placed in his chart.   Dysphagia  States that he has difficulty with swallowing secondary to being \"hunched over\" all the time and also said that he had thrush recently.   Said he only eats very soft foods at home. Will place on pureed diet and adjust per speech recs.   Still having dysphagia, he was having coughing when swallowing water today, HR increased to 160s sustained on telemetry, gave 1 dose lopressor and HR improved.   Appreciate speech eval.   COPD, severe (HCC)  Not in exacerbation continue home regimen  Recommend following with his outpatient Pulmonologist if patient still follows, states he was on hospice outpatient. Further GOC discussions to be had.   Patient wanting to be a full code but also wanting hospice, explained to patient that on hospice patient is DNR and would like to pass peacefully due to end of life care and due to his COPD he likely would not be weaned off a ventilator. Patient states he understands but would like to be a full code and still on hospice. Would like to sign out AMA.   Essential hypertension  History of htn on benicar 5 mg qd, lasix 80 mg bid, spironolactone 100 mg qd  BP acceptable in the ED, will hold spironolactone, continue with lasix at " "this time per CHF guidelines, hold benicar  Resume home medications on discharge, patient with further follow up with PCP - not clear for discharge medically at this time, patient signing out AMA.   Chronic respiratory failure with hypoxia (HCC)  History of chronic respiratory failure and severe copd. Chronically on 3L NC, currently on baseline o2 requirements  CXR: Chronic left lower lung atelectasis. No new focal consolidation, pleural effusion, or pneumothorax.  COVID/Flu/RSV negative  Does have leukocytosis of 12.36 on admission, procal negative. No evidence of infection  BC NG 24 hours.   Continue to monitor o2 saturations for goal saturation >89%.   Tachycardia  Patient with tachycardia on telemetry with HR up to 160s sustained after drinking water, was coughing and having difficulty with clearing secretion.   Appeared to have sinus tachycardia - on telemetry. Gave 1 dose of lopressor 5 and HR improved back down to 90s.   Lopressor prn  Monitor on telemetry at this time.   Palliative care by specialist    Severe protein-calorie malnutrition (HCC)  Malnutrition Findings:   Adult Malnutrition type: Chronic illness  Adult Degree of Malnutrition: Other severe protein calorie malnutrition  Malnutrition Characteristics: Inadequate energy, Weight loss                  360 Statement: Chronic severe malnutrition related to inadequate oral intake, swallowing difficulty as evidenced by < 75% estimated energy intake > 1 mo, 20.3% weight loss x 7mo (8/8/24 251lb, 3/18/25 198lb). Treated with: Fluid restriction per MD, no additional diet restrictions. Offered Ensure supplementation, pt refused stating he would not be able to swallow it. Consider trialing ensure with SLP if pt is willing/appropriate. Asked if Boost were available would he drink it, pt stated \"no\" and refusing all po intake. Recommend GOC discussion. Of note, appears to be d/c home with home hospice per CM note. Continue daily weights.    BMI Findings:      "      Body mass index is 32.1 kg/m².        Medical Problems       Resolved Problems  Date Reviewed: 3/18/2025   None         MESSAGE TO PCP (Jair Morton DO) FOR FOLLOW UP:   Thank you for allowing us to participate in the care of your patient, Tommie Ramirez, who was hospitalized from 3/16/2025 through 03/18/25 with the admitting diagnosis of chf exacerbation, hyponatremia, generalized weakness/falls.  Patient started on lasix gtt for CHF exacerbation and hyponatremia. Sodium improved however did have other electrolyte abnormalities. Palliative care consulted for GOC discussions however patient did not have clear goals at this time. Patient wanted to leave and be discharged on hospice, but also wanted to be full code. Due to this, patient not medically stable to leave at this time. Patient and wife opting to sign out AMA. Encourage outpatient follow up with palliative care.     Medication Changes:  Continue taking medications as prescribed  Outpatient testing recommended:  Repeat cbc and bmp in 1 week  Follow up with pcp in 1 week  Follow up with palliative care  Follow up with hospice agency   Follow up with cardiology outpatient if not going on hospice.   If you have any additional questions or would like to discuss further, please feel free to contact me.  Mamie Lara PA-C  [facility], Hospitalist, [phone]     Admission Date:   Admission Orders (From admission, onward)       Ordered        03/16/25 1408  INPATIENT ADMISSION  Once                          Discharge Date: 03/18/25    Consultations During Hospital Stay:  Palliative care, speech    Procedures Performed:   none    Significant Findings / Test Results:   XR chest 1 view portable   ED Interpretation by Mook Bedoya MD (03/16 2684)   Increased interstitial edema compared with prior, possible small left lower lobe pleural effusion.      Final Result by Makenna Arias MD (03/16 7526)      Chronic left  lower lung atelectasis      No new focal consolidation, pleural effusion, or pneumothorax.               Resident: Pilar Fagan I, the attending radiologist, have reviewed the images and agree with the final report above.      Workstation performed: [id number]           Echo: Pt was not cooperative and therefore the exam is limited. Left Ventricle: Left ventricular cavity size is normal. There is mild concentric hypertrophy. The left ventricular ejection fraction is 69%. Systolic function is hyperdynamic. Although no diagnostic regional wall motion abnormality was identified, this possibility cannot be completely excluded on the basis of this study. Diastolic function is mildly abnormal, consistent with grade I (abnormal) relaxation.  Left atrial filling pressure is normal. Right Ventricle: Systolic function is hyperdynamic. Aortic Valve: There is moderate stenosis. The aortic valve mean gradient is 17 mmHg. The dimensionless velocity index is 0.38. The aortic valve area is 1.1 cm2. Aortic valve peak velocity is 3.1 m/s. Mitral Valve: There is mild annular calcification. Tricuspid Valve: There is mild regurgitation. Pericardium: There is a trivial to small pericardial effusion along the right ventricular free wall. The fluid exhibits no internal echoes. Prior TTE study available for comparison. Prior study date: 3/28/2024. Changes noted when compared to prior study. Changes include: AS now appears moderate.  Procal negative  K: 2.7  Na: 126  WBC: 15.17  Serum osmo: 276  TSH normal  Troponin normal  Urine osmo: 234  Urine sodium: 64  UA: ketones: 15  A1c: 5.2  BC NG  COVID/Flu/RSV negative  Lipase: low  BNP: normal    Incidental Findings:   none     Test Results Pending at Discharge (will require follow up):   none     Complications:  left AMA    Reason for Admission: chf exacerbation, hyponatremia, ambulatory dysfunction    Hospital Course:   Tommie Ramirez is a 69 y.o. male patient who originally  presented to the hospital on 3/16/2025 due to fall that occurred at home. Found to have CHF exacerbation. Started on lasix drip and pt/ot consulted. Also with hyponatremia. Patient's sodium improved on lasix drip and chf improved however patient still overloaded. Palliative care consulted for GOC discussion as patient was on hospice prior to admission but did not appear to have clear goals. Further GOC was discussed and patient wanted to go home on hospice but wanted to remain a full code, explained to patient regarding goal directed medical therapy vs end of life care/comfort care, but patient would like to have both. Discussed that patient would benefit from outpatient palliative care and is not medically stable to be discharged at this time. Patient would like to sign out AMA. Did attempt to have neuropsych eval patient due to not fully grasping hospice however patient answer all other questions appropriately and understand other medical conditions and repercussions of leaving AMA. Discussed this with patient's wife over the phone as well and would also like patient to leave AMA. Form completed and placed in patient's chart. On discharge, can follow up with current hospice agency, but would strongly recommend patient follow up with palliative care at this time due to not clear goals regarding end of life care. Should repeat labs in 1 week. Follow up with PCP in 1 week and cardiology. Instructed to resume his diuretics as well due to not being euvolemic prior to discharge. Patient verbalized understanding and agreement to plans above.     Please see above list of diagnoses and related plan for additional information.     Condition at Discharge: poor    Discharge Day Visit / Exam:   Subjective:  Seen and examined today. Patient said that he feels fine and wants to go home. Has chronic back and neck pain and said that he wants to go home. Said that he does not plan on staying and would like to sign out AMA.  "Interrupting provider during conversations and saying he wants to leave. Explained AMA to him and he would like to sign out AMA. Forms completed.   Vitals: Blood Pressure: 108/59 (03/18/25 0736)  Pulse: 63 (03/18/25 0736)  Temperature: (!) 97.3 °F (36.3 °C) (03/18/25 0736)  Temp Source: Temporal (03/18/25 0736)  Respirations: 18 (03/18/25 0736)  Height: 5' 6\" (167.6 cm) (03/18/25 1005)  Weight - Scale: 90.2 kg (198 lb 13.7 oz) (03/18/25 0600)  SpO2: 98 % (03/18/25 0736)  Physical Exam  Vitals reviewed.   Constitutional:       General: He is not in acute distress.     Appearance: He is ill-appearing and toxic-appearing.      Comments: Whispering to talk   HENT:      Head: Normocephalic and atraumatic.      Mouth/Throat:      Mouth: Mucous membranes are dry.   Cardiovascular:      Rate and Rhythm: Normal rate and regular rhythm.      Heart sounds: No murmur heard.  Pulmonary:      Effort: No respiratory distress.      Breath sounds: No stridor. No wheezing, rhonchi or rales.      Comments: Very decreased breath sounds  Abdominal:      General: Bowel sounds are normal. There is no distension.      Palpations: Abdomen is soft. There is no mass.      Tenderness: There is no abdominal tenderness.   Genitourinary:     Comments: Catheter in place with clear yellow urine output  Musculoskeletal:      Right lower leg: Edema present.      Left lower leg: Edema present.   Skin:     General: Skin is warm and dry.   Neurological:      Mental Status: He is alert and oriented to person, place, and time.      Comments: Patient answering all orientation questions appropriately. Oriented to person, place, location, situation, current president, previous president. Was able to recall why he was previously on hospice as well.           Discussion with Family: Updated  (wife) via phone.    Discharge instructions/Information to patient and family:   See after visit summary for information provided to patient and family.  "     Provisions for Follow-Up Care:  See after visit summary for information related to follow-up care and any pertinent home health orders.      Mobility at time of Discharge:   Basic Mobility Inpatient Raw Score: 8  JH-HLM Goal: 3: Sit at edge of bed  JH-HLM Achieved: 4: Move to chair/commode  HLM Goal achieved. Continue to encourage appropriate mobility.     Disposition:   Home with VNA Services (Reminder: Complete face to face encounter)    Planned Readmission: no    Discharge Medications:  See after visit summary for reconciled discharge medications provided to patient and/or family.      Administrative Statements   Discharge Statement:  I have spent a total time of 65 minutes in caring for this patient on the day of the visit/encounter. >30 minutes of time was spent on: Diagnostic results, Prognosis, Risks and benefits of tx options, Instructions for management, Patient and family education, Importance of tx compliance, Risk factor reductions, Impressions, Counseling / Coordination of care, Documenting in the medical record, Reviewing / ordering tests, medicine, procedures  , and Communicating with other healthcare professionals .    **Please Note: This note may have been constructed using a voice recognition system**

## 2025-03-18 NOTE — ASSESSMENT & PLAN NOTE
Wt Readings from Last 3 Encounters:   03/18/25 90.2 kg (198 lb 13.7 oz)   05/18/24 118 kg (261 lb)   03/31/24 121 kg (267 lb 9.6 oz)     Presents to ED with frequent falls, chest pain from fall, increased SOB, increased leg swelling  ED diuresed with lasix 20 mg IV x 2.   Takes lasix 80 mg BID, spironolactone 100 mg qd for diuresis as outpatient however states that he hasn't been taking these recently.   CXR Chronic left lower lung atelectasis. No new focal consolidation, pleural effusion, or pneumothorax.  BNP normal  EKG NSR  ECHO 8/2024: There is mild concentric hypertrophy. Systolic function is normal with an ejection fraction of 55-60%. Wall   motion is within normal limits.   Repeat echo: Diastolic function is mildly abnormal, consistent with grade I (abnormal) relaxation   Continue diuresis with lasix gtt at this time  Daily weights, Strict I & Os  Cardiac diet, low sodium <2g, fluid restriction <1500  Consider cardiology consult pending GOC discussions.   D/c lasix gtt. Patient wanting to leave AMA despite discussions regarding GOC. Patient would like to be full code, but wants to go home on hospice. Explained that medically patient still is volume overloaded and with electrolyte imbalances which puts him at risk of fatal arrhythmias and death. Patient and wife (called while in room with patient) verbalized understanding of this. Patient signing out AMA.

## 2025-03-18 NOTE — ASSESSMENT & PLAN NOTE
"Malnutrition Findings:   Adult Malnutrition type: Chronic illness  Adult Degree of Malnutrition: Other severe protein calorie malnutrition  Malnutrition Characteristics: Inadequate energy, Weight loss                  360 Statement: Chronic severe malnutrition related to inadequate oral intake, swallowing difficulty as evidenced by < 75% estimated energy intake > 1 mo, 20.3% weight loss x 7mo (8/8/24 251lb, 3/18/25 198lb). Treated with: Fluid restriction per MD, no additional diet restrictions. Offered Ensure supplementation, pt refused stating he would not be able to swallow it. Consider trialing ensure with SLP if pt is willing/appropriate. Asked if Boost were available would he drink it, pt stated \"no\" and refusing all po intake. Recommend GOC discussion. Of note, appears to be d/c home with home hospice per CM note. Continue daily weights.    BMI Findings:           Body mass index is 32.1 kg/m².     "

## 2025-03-18 NOTE — SPEECH THERAPY NOTE
Speech Language/Pathology  Attempted to see pt for dysphagia therapy. Pt upright in recliner chair, head down positioning. SAMMI Machado reported pt declined breakfast, medication, and ensure. Reported he cannot swallow it despite it being same consistency as boost that he tolerates at home. Pt w/ multiple cups of thin liquids w/ straws in front of him. SLP encouraged trial of PO and/or trial of Ensure. Pt made a shooing motion with his hand, reported he's had two heart attacks and he is stressed being at the hospital. Pt declined all intake and asked SLP to leave. Will continue to follow as able/appropriate    Lexie Muñoz MS CCC-SLP  3/18/2025

## 2025-03-18 NOTE — DISCHARGE INSTR - AVS FIRST PAGE
Dear Tommie Ramirez,     It was our pleasure to care for you here at [facility]. For follow up as well as any medication refills, we recommend that you follow up with your primary care physician. Here are the most important instructions/ recommendations at discharge:     Notable Medication Adjustments -   Continue taking medications as above  Testing Required after Discharge - ** Please contact your PCP to request testing orders for any of the testing recommended here **  Repeat labs in 3 days  Important follow up information -   Follow up with PCP in 1 week  Would recommend following with palliative care to further discussion your goals of care to transition with hospice  Other Instructions -   Continue taking medications as prescribed.   Please review this entire after visit summary as additional general instructions including medication list, appointments, activity, diet, any pertinent wound care, and other additional recommendations from your care team that may be provided for you.      Sincerely,     Mamie Lara PA-C

## 2025-03-18 NOTE — NURSING NOTE
"As getting patient back into bed from commode, wife interfered with care by pushing RN out of the way. Four people were transferring patient from commode to bed and wife stated we were \"rough\" with patient while placing legs into bed. Patient is max assist with a walker, two staff members were helping with his legs and two people helping with the upper torso. Wife then pushed Beatriz RN, and holds onto patient and would not let go. She screamed \"you cannot move him like that, that's my \". We had then said in return, we move patient's like this all the time, since he is a max assist. She then stated, \"That's why none of your patient's make it.\". She would not let go of the patient for the staff to boost the patient in bed safely. Beatriz tried to raise the head of bed and wife pushed Beatriz away again. Control team was called and security was notified. Wife was removed from environment and escorted by security.   "

## 2025-03-18 NOTE — NURSING NOTE
"Patient has been on commode for over 2 hours and refusing to get back into bed or chair. Patient straining to have bowel movement, despite an enema and suppository treatment for constipation. Patient will not follow commands, even after providing education. This RN told patient that his sacrum will get sore if staying on the commode, and stated \"My ass has been sore for 1 year and 7 months, nothing will make it worse\".     As cleaning patient after a small BM, this RN was applying triad paste to sacrum and buttocks. Patient's wife told me that I was applying the cream wrong because I was rubbing it on and not \"tapping\" it on. Multiple times during patient care, patient's wife is interfering with care. Patient's wife also removed mepilex treatment on sacrum and BL buttocks. PCA and this RN told wife we are taking care of him and he is safe in our hands.     Patient called 911 because he wanted to leave. Patient stating he wanted to get off the commode, but we '\"wouldn't let him\". Multiple times, this RN and PCA attempted to transfer patient back into bed or the chair. Wife at bedside encouraging phone call. 911 was notified patient is safe and phone call ended.   "

## 2025-03-18 NOTE — ASSESSMENT & PLAN NOTE
Patient states that he was on hospice outpatient, patient stating that he would like treatment at this time and revoking hospice. He would like to be full code and talk with palliative care.   Palliative care consult: goals unclear d/t limited/interrupted discussions. Patient and spouse would like patient to go back home on hospice with [facility]  Discussed GOC with patient and spouse over the phone, both patient and spouse would like patient to be Full code, discussed that full code with CPR and Intubation means that we are focusing on getting him better and medical management and hospice is focusing on patient's comfort and end of life care, not medical management. Patient endorsing that he would like to still be full code and also on hospice.   Patient answers all orientation questions appropriately. He is alert and oriented. Knew name, , month, year, location, current and previous president, and situation. Knew why he was previously on hospice. Seems ill informed on hospice and GOC however. Did attempt neuropsych eval however patient did not want to participate. On exam currently, patient does answer all orientation questions appropriately. Discussed that patient would benefit greatly from following with palliative care outpatient to discuss further goals as he still would like to be full code. [facility] also willing to take patient back at this time with patient being full code and discussing goals of care further.   Discussed that patient is not medically clear for discharge at this time due to CHF exacerbation. Patient verbalized that he understands that he is not medically clear at this time. Explained to patient and spouse (over the phone) that patient is still volume overloaded and with electrolyte imbalances. Explained that he is not up and moving, did not have PT/OT evaluation either. Explained that patient may have worsening of CHF exacerbation, worsening respiratory failure,  pulmonary edema, fatal arrhythmias due to electrolyte imbalances leading to death. Patient and significant other verbalized understanding. Patient signed AMA form and placed in his chart.

## 2025-03-19 NOTE — PROGRESS NOTES
Following an in depth review and touch base with [place] leadership that included our president, [facility] Of patient care services, myself, our manager of [facility], and our  and patient Safety, it was determined that the event that took place between Dyan and our staff was a violation of our visitor code of conduct. After a thorough discussion, it was decided that the patient's spouse Dyan would not be allowed to visit effective immediately. This decision was made to protect the patient and our staff from harm. This Patient Care Manager called the patient's spouse, Dyan, to discuss the events that occurred on 3/17/2025 with our staff that lead to Dyan needing to be escorted out of the building. After taking time to review our Visitor and Patient code of conduct policy, I informed Dyan that her visitation would be restricted as of 3/18/2025 secondary to her violation that occurred on 3/17/2025. I informed Dyan that this is strictly a visitation restriction, however we would continue to keep her updated on the patient's plan of care as to not restrict the flow of information. Dyan was agreeable to this. I provided my direct phone line and let Dyan know that I would have Case Management update her regarding the patient's discharge. Dyan was understanding of the above.    Following my conversation with Dyan, this Patient Care Manager then contacted the patient's daughter, Lenora, to update her on the events and on Dyan's visitation restriction. Of note, Dyan is NOT Lenora's biological mother. I informed Lenora of our visitor and patient code of conduct and how Dyan's actions were in direct violation. Lenora expressed understanding of this. I was able to address additional questions regarding discharge at this time, however, I let Lenora know that I would touch base with Case Management and have them provide a more in depth update. Lenora was  thankful for my time and communication.

## 2025-03-19 NOTE — CASE MANAGEMENT
Case Management Discharge Planning Note    Patient name Tommie Ramirez  Location /-01 MRN [id number]  : 1955 Date 3/19/2025       Current Admission Date: 3/16/2025  Current Admission Diagnosis:Acute on chronic diastolic (congestive) heart failure (HCC)   Patient Active Problem List    Diagnosis Date Noted Date Diagnosed    Severe protein-calorie malnutrition (HCC) 2025     Tachycardia 2025     Palliative care by specialist 2025     Hyponatremia 2025     Ambulatory dysfunction 2025     Goals of care, counseling/discussion 2025     Dysphagia 2025     Cellulitis of left lower extremity 2024     Acute on chronic diastolic (congestive) heart failure (HCC) 2023     Elevated lipase 2023     Chronic respiratory failure with hypoxia (HCC) 2023     Acute on chronic respiratory failure (HCC) 2022     Acute diastolic (congestive) heart failure (formerly [facility]) 2022     Morbid obesity with BMI of 40.0-44.9, adult (formerly [facility]) 2022     COPD, severe (formerly [facility])      Essential hypertension      Tobacco abuse        LOS (days): 2  Geometric Mean LOS (GMLOS) (days): 3.9  Days to GMLOS:1.8     OBJECTIVE:  Risk of Unplanned Readmission Score: 11.11         Current admission status: Inpatient   Preferred Pharmacy:   [facility] [place] [address]  [address] [address]  Phone: [phone] Fax: [phone]    [facility]/pharmacy #[id number] - [place] - [address]  [address] [facility] [address]  Phone: [phone] Fax: [phone]    Primary Care Provider: Jair Morton DO    Primary Insurance: MEDICARE  Secondary Insurance: [facility]    DISCHARGE DETAILS:        929- Call placed to [facility]; spoke with Carroll Zavala, made aware patient signed out AMA yesterday. Pt has multiple medical issues, requesting to go home with hospice services, but remain a full  code. Pt was explained the risks of leaving AMA and verbalized understanding. Pts daughter and friend were at hospital to provide transportation to home; they had concerns regarding safely getting patient home due to requiring assistance with transfers and 5 steps to enter home.     CM unable to set up transport to home due to patient signing out AMA; family is aware of this and verbalized understanding.   CM reached out to several EMS/Fire companies to provide transport assist; family would have to pay $[phone] for BLS transport and fire declined to assist due to they are frequently at home to assist when patient falls.       [facility] referral was sent and pt was accepted on their services, per pt/spouse request. Per [facility] they will reach out to patient at home to start services again.        Carroll Zavala with [facility] made aware of above, frequent falls at home, check on patients safety at home. Per Carroll Zavala; [facility] will follow up with patient at home. Patient contact information address, phone number, and spouse contact information provided to Carroll Zavala.

## 2025-03-20 ENCOUNTER — TELEPHONE (OUTPATIENT)
Dept: PALLIATIVE MEDICINE | Facility: CLINIC | Age: 70
End: 2025-03-20

## 2025-03-20 NOTE — TELEPHONE ENCOUNTER
Per [facility] Note- Though goals remain unclear, the patient and spouse did indicate some interest in returning home soon with [facility] hospice.   Removed from HFU

## 2025-03-21 LAB
BACTERIA BLD CULT: NORMAL
BACTERIA BLD CULT: NORMAL